# Patient Record
Sex: FEMALE | Race: WHITE | NOT HISPANIC OR LATINO | Employment: OTHER | URBAN - METROPOLITAN AREA
[De-identification: names, ages, dates, MRNs, and addresses within clinical notes are randomized per-mention and may not be internally consistent; named-entity substitution may affect disease eponyms.]

---

## 2018-09-11 ENCOUNTER — TRANSFERRED RECORDS (OUTPATIENT)
Dept: HEALTH INFORMATION MANAGEMENT | Facility: CLINIC | Age: 68
End: 2018-09-11

## 2018-09-27 ENCOUNTER — TRANSFERRED RECORDS (OUTPATIENT)
Dept: HEALTH INFORMATION MANAGEMENT | Facility: CLINIC | Age: 68
End: 2018-09-27

## 2018-11-06 ENCOUNTER — TRANSFERRED RECORDS (OUTPATIENT)
Dept: HEALTH INFORMATION MANAGEMENT | Facility: CLINIC | Age: 68
End: 2018-11-06

## 2018-11-09 ENCOUNTER — TRANSFERRED RECORDS (OUTPATIENT)
Dept: HEALTH INFORMATION MANAGEMENT | Facility: CLINIC | Age: 68
End: 2018-11-09

## 2018-11-13 DIAGNOSIS — M31.30 WEGENER'S GRANULOMATOSIS: Primary | ICD-10-CM

## 2018-11-16 ENCOUNTER — PATIENT OUTREACH (OUTPATIENT)
Dept: CARE COORDINATION | Facility: CLINIC | Age: 68
End: 2018-11-16

## 2018-11-17 ENCOUNTER — OFFICE VISIT (OUTPATIENT)
Dept: NEPHROLOGY | Facility: CLINIC | Age: 68
End: 2018-11-17
Attending: INTERNAL MEDICINE
Payer: MEDICARE

## 2018-11-17 VITALS
BODY MASS INDEX: 39.75 KG/M2 | SYSTOLIC BLOOD PRESSURE: 115 MMHG | DIASTOLIC BLOOD PRESSURE: 78 MMHG | OXYGEN SATURATION: 98 % | WEIGHT: 232.8 LBS | HEIGHT: 64 IN | HEART RATE: 78 BPM

## 2018-11-17 DIAGNOSIS — M31.30 WEGENER'S GRANULOMATOSIS: ICD-10-CM

## 2018-11-17 LAB
ALBUMIN UR-MCNC: NEGATIVE MG/DL
APPEARANCE UR: CLEAR
BILIRUB UR QL STRIP: NEGATIVE
COLOR UR AUTO: YELLOW
CREAT UR-MCNC: 155 MG/DL
GLUCOSE UR STRIP-MCNC: NEGATIVE MG/DL
HGB UR QL STRIP: ABNORMAL
KETONES UR STRIP-MCNC: NEGATIVE MG/DL
LEUKOCYTE ESTERASE UR QL STRIP: ABNORMAL
MUCOUS THREADS #/AREA URNS LPF: PRESENT /LPF
NITRATE UR QL: NEGATIVE
PH UR STRIP: 5 PH (ref 5–7)
PROT UR-MCNC: 0.06 G/L
PROT/CREAT 24H UR: 0.04 G/G CR (ref 0–0.2)
RBC #/AREA URNS AUTO: 1 /HPF (ref 0–2)
SOURCE: ABNORMAL
SP GR UR STRIP: 1.02 (ref 1–1.03)
SQUAMOUS #/AREA URNS AUTO: 1 /HPF (ref 0–1)
UROBILINOGEN UR STRIP-MCNC: 0 MG/DL (ref 0–2)
WBC #/AREA URNS AUTO: 2 /HPF (ref 0–5)

## 2018-11-17 PROCEDURE — G0463 HOSPITAL OUTPT CLINIC VISIT: HCPCS | Mod: ZF

## 2018-11-17 PROCEDURE — 81001 URINALYSIS AUTO W/SCOPE: CPT | Performed by: INTERNAL MEDICINE

## 2018-11-17 PROCEDURE — 84156 ASSAY OF PROTEIN URINE: CPT | Performed by: INTERNAL MEDICINE

## 2018-11-17 RX ORDER — VALACYCLOVIR HYDROCHLORIDE 1 G/1
TABLET, FILM COATED ORAL
COMMUNITY
Start: 2018-11-12 | End: 2019-04-18

## 2018-11-17 RX ORDER — SULFAMETHOXAZOLE/TRIMETHOPRIM 800-160 MG
TABLET ORAL
COMMUNITY
Start: 2018-09-12 | End: 2019-04-18

## 2018-11-17 RX ORDER — LEVOTHYROXINE SODIUM 112 UG/1
112 TABLET ORAL
COMMUNITY
End: 2019-05-08 | Stop reason: DRUGHIGH

## 2018-11-17 ASSESSMENT — PAIN SCALES - GENERAL: PAINLEVEL: NO PAIN (0)

## 2018-11-17 NOTE — NURSING NOTE
"Chief Complaint   Patient presents with     Consult     Wegener's Granulomatosis      Blood pressure 118/76, pulse 78, height 1.632 m (5' 4.25\"), weight 105.6 kg (232 lb 12.8 oz), SpO2 98 %.    JENIFER SILVERMAN CMA    "

## 2018-11-17 NOTE — LETTER
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 She was hospitalized x 10 days in [place]. Initially, hemoptysis attributed to pneumonia. She mentioned that her lung was \"messed up enough\" that they could not perform a (transbronchial) lung biopsy. She was put on prednisone and tapered off eventually. While she was at [place], the diagnosis of Wegener's was not confirmed.  March 2013:  Back in [place].  Admitted to [facility] with acute renal failure and scant hemoptysis.  She had significant fatigue, oral ulcers, scant purpura on the forearms ( \"months\" after finishing the course of prednisone).  She did not require dialysis.  Dx of \"Wegener's granulomatosis\"  -> plasmapheresis (x 10+ sessions?) under the care of Dr Lowry (nephrology) .  She received prednisone and was treated with Rituximab once per week for 4 weeks.  April 2013 - 2018  : She was on prednisone for around 4 months. She experienced a lot of side effect including weight gain, mood changes, cushingoid facies.  She denies have hyperglycemia.  She was tapered off prednisone She then received rituximab every 6 months (1000 mg)       Treatment summary  March 2013: Plasmapheresis + Rituximab  Rituximab 1000 mg IV every 6 month since initial treatment in 2013 October 2018: Last dose of Rituximab 1000 mg. (8months after previous dose)  "     Interval summary  Her main question at this time pertains to the need for continued maintenance therapy with rituximab.  She asks about her risk for relapse.  Since she has been getting rituximab since the onset of the disease, she is not sure about how to move forward. She endorses having joint pain all of the time from multiple kinds of arthritis. She denies nose bleeding/hair loss/mouth sore but would have periodic nose congestion. Overall, she did not have any official flare' of the disease since 2013. She only has the aforementioned symptoms which she thinks it could be related to cold or other minor illness. Reported feeling good 8/10. The bothersome symptoms were plantar fasciitis s/p steroid injection and back pain. She has not been using CPAP because she was not satisfied with the encounter with sleep providers.    PAST MEDICAL HISTORY:  Past Medical History:   Diagnosis Date     DVT (deep venous thrombosis) (H)     occured 1 month after starting OCP     GERD (gastroesophageal reflux disease)      Hyperuricemia      Hypothyroid      Obesity (BMI 35.0-39.9 without comorbidity)      Venous insufficiency (chronic) (peripheral)      Wegener's granulomatosis with renal involvement (H)     , No history of complicated pregnancies.  Last pregnancy with twins       PAST SURGICAL HISTORY:  Past Surgical History:   Procedure Laterality Date     HYSTERECTOMY       TUBAL LIGATION            MEDICATIONS:  Prescription Medications as of 2018             LACTOBACILLUS PO 1 tablet    levothyroxine (SYNTHROID/LEVOTHROID) 112 MCG tablet Take 112 mcg by mouth    sulfamethoxazole-trimethoprim (BACTRIM DS/SEPTRA DS) 800-160 MG per tablet     UNABLE TO FIND TURMERIC OR    valACYclovir (VALTREX) 1000 mg tablet         ALLERGIES:    Not on File     REVIEW OF SYSTEMS:  A comprehensive review of systems was performed and found to be negative except as described here or above.   SOCIAL HISTORY:   Social  "History     Social History     Marital status:      Spouse name: N/A     Number of children: N/A     Years of education: N/A     Occupational History     Not on file.     Social History Main Topics     Smoking status: Never Smoker     Smokeless tobacco: Never Used     Alcohol use No     Drug use: No     Sexual activity: Yes     Partners: Male     Other Topics Concern     Not on file     Social History Narrative     No narrative on file     Lives in [place]  Family is in town in [place]     FAMILY MEDICAL HISTORY:   Has 4 children, Daughter 48, Daughter 47, fraternal twin : daughter and son age 46    PHYSICAL EXAM:   /78  Pulse 78  Ht 1.632 m (5' 4.25\")  Wt 105.6 kg (232 lb 12.8 oz)  SpO2 98%  BMI 39.65 kg/m2  GENERAL APPEARANCE: alert and no distress, joyful  EYES: nonicteric  HENT: mouth without ulcers or lesions, TM without erythema  NECK: supple, no adenopathy  RESP: lungs clear to auscultation   CV: regular rhythm, normal rate, no rub  ABDOMEN: soft, nontender, normal bowel sounds  Extremities: no clubbing, cyanosis, trace pitting edema left>right  MS: no evidence of inflammation in joints, no muscle tenderness  SKIN: no rash  NEURO: mentation intact and speech normal  PSYCH: affect normal/bright   LABS:       Result See Note  4/11/18  Comment:  KELSEY BRISCOE  MRN:    [id number]  Myeloperoxidase Antibody 0   Comment:  INTERPRETIVE INFORMATION: Myeloperoxidase Abs, IgG    19 AU/mL or Less ......... Negative    20-25 AU/mL .............. Equivocal    26 AU/mL or Greater ...... Positive       Serine Protease 3 Antibody  Test                          Result   Units   Ref. Interval  Serine Protease3, IgG         4        AU/mL   0-19  INTERPRETIVE INFORMATION: Serine Protease 3, IgG  19 AU/mL or Less ........ Negative  20-25 AU/mL ............. Equivocal  26 AU/mL or Greater ..... Positive       Recent Results (from the past 1008 hour(s))   UA with Microscopic reflex to Culture    Collection " Time: 11/17/18  7:56 AM   Result Value Ref Range    Color Urine Yellow     Appearance Urine Clear     Glucose Urine Negative NEG^Negative mg/dL    Bilirubin Urine Negative NEG^Negative    Ketones Urine Negative NEG^Negative mg/dL    Specific Gravity Urine 1.018 1.003 - 1.035    Blood Urine Small (A) NEG^Negative    pH Urine 5.0 5.0 - 7.0 pH    Protein Albumin Urine Negative NEG^Negative mg/dL    Urobilinogen mg/dL 0.0 0.0 - 2.0 mg/dL    Nitrite Urine Negative NEG^Negative    Leukocyte Esterase Urine Small (A) NEG^Negative    Source Midstream Urine     WBC Urine 2 0 - 5 /HPF    RBC Urine 1 0 - 2 /HPF    Squamous Epithelial /HPF Urine 1 0 - 1 /HPF    Mucous Urine Present (A) NEG^Negative /LPF   Protein  random urine with Creat Ratio    Collection Time: 11/17/18  7:56 AM   Result Value Ref Range    Protein Random Urine 0.06 g/L    Protein Total Urine g/gr Creatinine 0.04 0 - 0.2 g/g Cr   Creatinine urine calculation only    Collection Time: 11/17/18  7:56 AM   Result Value Ref Range    Creatinine Urine 155 mg/dL   Urine sediment today is BLAND  Outside BMP 11/9/18  Cr 0.79 (GFR 73 ml/min)  Hb 13.4 g/dl  WBC 9.2 k/uL  CRP 1.7 mg/dl    URINE STUDIES  Recent Labs   Lab Test  11/17/18   0756   COLOR  Yellow   APPEARANCE  Clear   URINEGLC  Negative   URINEBILI  Negative   URINEKETONE  Negative   SG  1.018   UBLD  Small*   URINEPH  5.0   PROTEIN  Negative   NITRITE  Negative   LEUKEST  Small*   RBCU  1   WBCU  2     Recent Labs   Lab Test  11/17/18   0756   UTPG  0.04        L Ext Doppler  6/3/2015  Result Impression     Negative for acute DVT. Chronic changes of previous thrombosis in the left lower extremity deep venous system as described below.     FINDINGS:     RIGHT:  The common femoral, upper deep femoral, femoral, and popliteal veins are widely patent, without thrombus. The posterior tibial and peroneal veins were segmentally visualized and are normal where seen. The great saphenous vein is patent and negative for  thrombus. There is a 2.4 x 1.0 x 3.6 cm in size right popliteal fossa cyst.     LEFT: There are chronic changes of previous thrombosis, including irregular venous wall thickening, reduced luminal diameter, and intraluminal webbing, in the left common femoral, femoral, popliteal, and gastrocnemius veins. No acute deep venous thrombosis. The left upper deep femoral and segmentally visualized posterior tibial and peroneal veins are widely patent. The left great saphenous vein is also patent. There is a 1.5 x 0.6 x 2.6 cm in size left popliteal fossa cyst.       Electronically signed by:    Kike Bertrand MD  [phone] 03-Jun-2015 11:26      CT Chest without IV contrast 6/2/2015  Result Impression     1. 8 mm nodule, with adjacent smaller nodules, in the left lower lobe has been stable since outside chest CT 04/21/2014. Recommend follow-up imaging in 6-12 months.   2. Stable bilateral patchy ground glass opacities, likely due to shallow inspiration.   3. Mild right hilar adenopathy, which is likely reactive.     FINDINGS:  Stable 8 mm nodule in the superior segment of the left lower lobe; this nodule has been stable since outside chest CT 04/21/2014. The two surrounding smaller nodules are relatively stable in appearance since 12/01/2014 (these nodules were obscured on outside chest CT). No new pulmonary nodules. Stable scattered groundglass opacities in both lungs which may be due to shallow inspiration. Tiny calcified granuloma right lung apex. Trace amount of atelectasis in the lingula. Scattered mediastinal and left hilar lymph nodes are more prominent on today's exam, however remain within normal limits for size. New mild right hilar adenopathy, with largest node measuring approximately 1 cm in maximal diameter.     Electronically signed by:    MAGALIS Vargas -[id number] 02-Jun-2015 10:46        Mario Freeman MD [phone] 02-Jun-2015 10:46       CT lung 2018: Stable lung nodules and 1 mm non-obstructing stone at upper pole of left  kidney    ASSESSMENT AND PLAN:  # GPA (dx 2013)  Presented with pulmonary and renal involvement. S/P Rituximab q6 month since 5954-6502 without flare of the disease. Her last serology on April 2018 were reassuring. As she has been through a quiescent course, it may be reasonable to space out and/or reduce the dosage of rituximab. Last dose of rituximab was Oct 2018 . She will come back from [place] in March 2019.  - Vasculitis panel and Bcell function today.  She is interested in spacing out the intervals between rituximab doses.  We discussed the approach of dosing based on CD19+ B cells, ANCA titers, and symptoms.  She states her ANCA titers have persistently been negative.   - f/u around March 2019 after the patient is back from [place]      Mario Fernandes MD  Division of Renal Disease and Hypertension  November 17, 2018  6:29 AM  Note completed November 18, 2018  8:49 PM        Mario Fernandes MD

## 2018-11-17 NOTE — PROGRESS NOTES
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 She was hospitalized x 10 days in [place]. Initially, hemoptysis attributed to pneumonia. She mentioned that her lung was \"messed up enough\" that they could not perform a (transbronchial) lung biopsy. She was put on prednisone and tapered off eventually. While she was at [place], the diagnosis of Wegener's was not confirmed.  March 2013:  Back in [place].  Admitted to [facility] with acute renal failure and scant hemoptysis.  She had significant fatigue, oral ulcers, scant purpura on the forearms ( \"months\" after finishing the course of prednisone).  She did not require dialysis.  Dx of \"Wegener's granulomatosis\"  -> plasmapheresis (x 10+ sessions?) under the care of Dr Lowry (nephrology) .  She received prednisone and was treated with Rituximab once per week for 4 weeks.  April 2013 - 2018  : She was on prednisone for around 4 months. She experienced a lot of side effect including weight gain, mood changes, cushingoid facies.  She denies have hyperglycemia.  She was tapered off prednisone She then received rituximab every 6 months (1000 mg)       Treatment summary  March 2013: Plasmapheresis + Rituximab  Rituximab 1000 mg IV every 6 month since initial treatment in 2013 October 2018: Last dose of Rituximab 1000 mg. (8months after previous dose)      Interval summary  Her main question at this time pertains to the need for continued " maintenance therapy with rituximab.  She asks about her risk for relapse.  Since she has been getting rituximab since the onset of the disease, she is not sure about how to move forward. She endorses having joint pain all of the time from multiple kinds of arthritis. She denies nose bleeding/hair loss/mouth sore but would have periodic nose congestion. Overall, she did not have any official flare' of the disease since 2013. She only has the aforementioned symptoms which she thinks it could be related to cold or other minor illness. Reported feeling good 8/10. The bothersome symptoms were plantar fasciitis s/p steroid injection and back pain. She has not been using CPAP because she was not satisfied with the encounter with sleep providers.    PAST MEDICAL HISTORY:  Past Medical History:   Diagnosis Date     DVT (deep venous thrombosis) (H)     occured 1 month after starting OCP     GERD (gastroesophageal reflux disease)      Hyperuricemia      Hypothyroid      Obesity (BMI 35.0-39.9 without comorbidity)      Venous insufficiency (chronic) (peripheral)      Wegener's granulomatosis with renal involvement (H)     , No history of complicated pregnancies.  Last pregnancy with twins       PAST SURGICAL HISTORY:  Past Surgical History:   Procedure Laterality Date     HYSTERECTOMY       TUBAL LIGATION            MEDICATIONS:  Prescription Medications as of 2018             LACTOBACILLUS PO 1 tablet    levothyroxine (SYNTHROID/LEVOTHROID) 112 MCG tablet Take 112 mcg by mouth    sulfamethoxazole-trimethoprim (BACTRIM DS/SEPTRA DS) 800-160 MG per tablet     UNABLE TO FIND TURMERIC OR    valACYclovir (VALTREX) 1000 mg tablet         ALLERGIES:    Not on File     REVIEW OF SYSTEMS:  A comprehensive review of systems was performed and found to be negative except as described here or above.   SOCIAL HISTORY:   Social History     Social History     Marital status:      Spouse name: N/A     Number of  "children: N/A     Years of education: N/A     Occupational History     Not on file.     Social History Main Topics     Smoking status: Never Smoker     Smokeless tobacco: Never Used     Alcohol use No     Drug use: No     Sexual activity: Yes     Partners: Male     Other Topics Concern     Not on file     Social History Narrative     No narrative on file     Lives in [place]  Family is in town in [place]     FAMILY MEDICAL HISTORY:   Has 4 children, Daughter 48, Daughter 47, fraternal twin : daughter and son age 46    PHYSICAL EXAM:   /78  Pulse 78  Ht 1.632 m (5' 4.25\")  Wt 105.6 kg (232 lb 12.8 oz)  SpO2 98%  BMI 39.65 kg/m2  GENERAL APPEARANCE: alert and no distress, joyful  EYES: nonicteric  HENT: mouth without ulcers or lesions, TM without erythema  NECK: supple, no adenopathy  RESP: lungs clear to auscultation   CV: regular rhythm, normal rate, no rub  ABDOMEN: soft, nontender, normal bowel sounds  Extremities: no clubbing, cyanosis, trace pitting edema left>right  MS: no evidence of inflammation in joints, no muscle tenderness  SKIN: no rash  NEURO: mentation intact and speech normal  PSYCH: affect normal/bright   LABS:       Result See Note 4/11/18  Comment:  KELSEY BRISCOE  MRN:    [id number]  Myeloperoxidase Antibody 0   Comment:  INTERPRETIVE INFORMATION: Myeloperoxidase Abs, IgG    19 AU/mL or Less ......... Negative    20-25 AU/mL .............. Equivocal    26 AU/mL or Greater ...... Positive       Serine Protease 3 Antibody  Test                          Result   Units   Ref. Interval  Serine Protease3, IgG         4        AU/mL   0-19  INTERPRETIVE INFORMATION: Serine Protease 3, IgG  19 AU/mL or Less ........ Negative  20-25 AU/mL ............. Equivocal  26 AU/mL or Greater ..... Positive       Recent Results (from the past 1008 hour(s))   UA with Microscopic reflex to Culture    Collection Time: 11/17/18  7:56 AM   Result Value Ref Range    Color Urine Yellow     Appearance " Urine Clear     Glucose Urine Negative NEG^Negative mg/dL    Bilirubin Urine Negative NEG^Negative    Ketones Urine Negative NEG^Negative mg/dL    Specific Gravity Urine 1.018 1.003 - 1.035    Blood Urine Small (A) NEG^Negative    pH Urine 5.0 5.0 - 7.0 pH    Protein Albumin Urine Negative NEG^Negative mg/dL    Urobilinogen mg/dL 0.0 0.0 - 2.0 mg/dL    Nitrite Urine Negative NEG^Negative    Leukocyte Esterase Urine Small (A) NEG^Negative    Source Midstream Urine     WBC Urine 2 0 - 5 /HPF    RBC Urine 1 0 - 2 /HPF    Squamous Epithelial /HPF Urine 1 0 - 1 /HPF    Mucous Urine Present (A) NEG^Negative /LPF   Protein  random urine with Creat Ratio    Collection Time: 11/17/18  7:56 AM   Result Value Ref Range    Protein Random Urine 0.06 g/L    Protein Total Urine g/gr Creatinine 0.04 0 - 0.2 g/g Cr   Creatinine urine calculation only    Collection Time: 11/17/18  7:56 AM   Result Value Ref Range    Creatinine Urine 155 mg/dL   Urine sediment today is BLAND  Outside BMP 11/9/18  Cr 0.79 (GFR 73 ml/min)  Hb 13.4 g/dl  WBC 9.2 k/uL  CRP 1.7 mg/dl    URINE STUDIES  Recent Labs   Lab Test  11/17/18   0756   COLOR  Yellow   APPEARANCE  Clear   URINEGLC  Negative   URINEBILI  Negative   URINEKETONE  Negative   SG  1.018   UBLD  Small*   URINEPH  5.0   PROTEIN  Negative   NITRITE  Negative   LEUKEST  Small*   RBCU  1   WBCU  2     Recent Labs   Lab Test  11/17/18   0756   UTPG  0.04        L Ext Doppler  6/3/2015  Result Impression     Negative for acute DVT. Chronic changes of previous thrombosis in the left lower extremity deep venous system as described below.     FINDINGS:     RIGHT:  The common femoral, upper deep femoral, femoral, and popliteal veins are widely patent, without thrombus. The posterior tibial and peroneal veins were segmentally visualized and are normal where seen. The great saphenous vein is patent and negative for thrombus. There is a 2.4 x 1.0 x 3.6 cm in size right popliteal fossa cyst.     LEFT:  There are chronic changes of previous thrombosis, including irregular venous wall thickening, reduced luminal diameter, and intraluminal webbing, in the left common femoral, femoral, popliteal, and gastrocnemius veins. No acute deep venous thrombosis. The left upper deep femoral and segmentally visualized posterior tibial and peroneal veins are widely patent. The left great saphenous vein is also patent. There is a 1.5 x 0.6 x 2.6 cm in size left popliteal fossa cyst.       Electronically signed by:    Kike Bertrand MD  [phone] 03-Jun-2015 11:26      CT Chest without IV contrast 6/2/2015  Result Impression     1. 8 mm nodule, with adjacent smaller nodules, in the left lower lobe has been stable since outside chest CT 04/21/2014. Recommend follow-up imaging in 6-12 months.   2. Stable bilateral patchy ground glass opacities, likely due to shallow inspiration.   3. Mild right hilar adenopathy, which is likely reactive.     FINDINGS:  Stable 8 mm nodule in the superior segment of the left lower lobe; this nodule has been stable since outside chest CT 04/21/2014. The two surrounding smaller nodules are relatively stable in appearance since 12/01/2014 (these nodules were obscured on outside chest CT). No new pulmonary nodules. Stable scattered groundglass opacities in both lungs which may be due to shallow inspiration. Tiny calcified granuloma right lung apex. Trace amount of atelectasis in the lingula. Scattered mediastinal and left hilar lymph nodes are more prominent on today's exam, however remain within normal limits for size. New mild right hilar adenopathy, with largest node measuring approximately 1 cm in maximal diameter.     Electronically signed by:    MAGALIS Vargas -[id number] 02-Jun-2015 10:46        Mario Freeman MD [phone] 02-Jun-2015 10:46       CT lung 2018: Stable lung nodules and 1 mm non-obstructing stone at upper pole of left kidney    ASSESSMENT AND PLAN:  # GPA (dx 2013)  Presented with pulmonary and renal  involvement. S/P Rituximab q6 month since [id number] without flare of the disease. Her last serology on April 2018 were reassuring. As she has been through a quiescent course, it may be reasonable to space out and/or reduce the dosage of rituximab. Last dose of rituximab was Oct 2018 . She will come back from [place] in March 2019.  - Vasculitis panel and Bcell function today.  She is interested in spacing out the intervals between rituximab doses.  We discussed the approach of dosing based on CD19+ B cells, ANCA titers, and symptoms.  She states her ANCA titers have persistently been negative.   - f/u around March 2019 after the patient is back from [place]      Mario Fernandes MD  Division of Renal Disease and Hypertension  November 17, 2018  6:29 AM  Note completed November 18, 2018  8:49 PM

## 2018-11-17 NOTE — MR AVS SNAPSHOT
After Visit Summary   11/17/2018    Waleska Veronica    MRN: [id number]           Patient Information     Date Of Birth          1950        Visit Information        Provider Department      11/17/2018 8:00 AM Mario Fernandes MD [facility]        Today's Diagnoses     Wegener's granulomatosis (H)           Follow-ups after your visit        Follow-up notes from your care team     Return in about 4 months (around 3/17/2019) for Routine Visit.      Future tests that were ordered for you today     Open Future Orders        Priority Expected Expires Ordered    Vasculitis panel Routine  11/17/2019 11/17/2018    CD19 B Cell Count Routine  11/17/2019 11/17/2018            Who to contact     If you have questions or need follow up information about today's clinic visit or your schedule please contact [facility] directly at [phone].  Normal or non-critical lab and imaging results will be communicated to you by [employer], letter or phone within 4 business days after the clinic has received the results. If you do not hear from us within 7 days, please contact the clinic through [employer] or phone. If you have a critical or abnormal lab result, we will notify you by phone as soon as possible.  Submit refill requests through [employer] or call your pharmacy and they will forward the refill request to us. Please allow 3 business days for your refill to be completed.          Additional Information About Your Visit        MyChart Information     [employer] gives you secure access to your electronic health record. If you see a primary care provider, you can also send messages to your care team and make appointments. If you have questions, please call your primary care clinic.  If you do not have a primary care provider, please call [phone] and they will assist you.        Care EveryWhere ID     This is your Care EveryWhere ID. This could be used by other organizations to access your [place]  "medical records  [id number]        Your Vitals Were     Pulse Height Pulse Oximetry BMI (Body Mass Index)          78 1.632 m (5' 4.25\") 98% 39.65 kg/m2         Blood Pressure from Last 3 Encounters:   11/17/18 115/78    Weight from Last 3 Encounters:   11/17/18 105.6 kg (232 lb 12.8 oz)              We Performed the Following     Creatinine urine calculation only     Protein  random urine with Creat Ratio     UA with Microscopic reflex to Culture        Primary Care Provider Fax #    Physician No Ref-Primary [phone]       No address on file        Equal Access to Services     [facility]: Hadantoine Pineda, wacam spencer, kirstin kaalmajovon cain, yisel connors . [facility] [phone].    ATENCIÓN: Si habla español, tiene a gomez disposición servicios gratuitos de asistencia lingüística. [facility] [phone].    We comply with applicable federal civil rights laws and [place] laws. We do not discriminate on the basis of race, color, national origin, age, disability, sex, sexual orientation, or gender identity.            Thank you!     Thank you for choosing [facility]  for your care. Our goal is always to provide you with excellent care. Hearing back from our patients is one way we can continue to improve our services. Please take a few minutes to complete the written survey that you may receive in the mail after your visit with us. Thank you!             Your Updated Medication List - Protect others around you: Learn how to safely use, store and throw away your medicines at www.disposemymeds.org.          This list is accurate as of 11/17/18 11:59 PM.  Always use your most recent med list.                   Brand Name Dispense Instructions for use Diagnosis    LACTOBACILLUS PO      1 tablet        levothyroxine 112 MCG tablet    SYNTHROID/LEVOTHROID     Take 112 mcg by mouth        sulfamethoxazole-trimethoprim 800-160 MG per tablet    BACTRIM DS/SEPTRA DS "          UNABLE TO FIND      TURMERIC OR        valACYclovir 1000 mg tablet    VALTREX

## 2019-01-11 ENCOUNTER — TELEPHONE (OUTPATIENT)
Dept: NEPHROLOGY | Facility: CLINIC | Age: 69
End: 2019-01-11

## 2019-01-11 NOTE — TELEPHONE ENCOUNTER
[facility] Call Center    Phone Message    May a detailed message be left on voicemail: yes    Reason for Call: Other: Please send lab orders electronically as lab core faxing system is down.      Action Taken: Message routed to:  [facility] neph

## 2019-01-16 DIAGNOSIS — I77.82 ANCA-ASSOCIATED VASCULITIS (H): Primary | ICD-10-CM

## 2019-01-19 DIAGNOSIS — I77.82 ANCA-ASSOCIATED VASCULITIS (H): ICD-10-CM

## 2019-01-19 DIAGNOSIS — M31.30 WEGENER'S GRANULOMATOSIS: ICD-10-CM

## 2019-01-19 LAB
ALBUMIN SERPL-MCNC: 3.3 G/DL (ref 3.4–5)
ANION GAP SERPL CALCULATED.3IONS-SCNC: 4 MMOL/L (ref 3–14)
BUN SERPL-MCNC: 17 MG/DL (ref 7–30)
CALCIUM SERPL-MCNC: 8.3 MG/DL (ref 8.5–10.1)
CD19 CELLS # BLD: <1 CELLS/UL (ref 107–698)
CD19 CELLS NFR BLD: <1 % (ref 6–27)
CHLORIDE SERPL-SCNC: 107 MMOL/L (ref 94–109)
CO2 SERPL-SCNC: 29 MMOL/L (ref 20–32)
CREAT SERPL-MCNC: 0.86 MG/DL (ref 0.52–1.04)
ERYTHROCYTE [DISTWIDTH] IN BLOOD BY AUTOMATED COUNT: 13.3 % (ref 10–15)
GFR SERPL CREATININE-BSD FRML MDRD: 69 ML/MIN/{1.73_M2}
GLUCOSE SERPL-MCNC: 108 MG/DL (ref 70–99)
HCT VFR BLD AUTO: 44.8 % (ref 35–47)
HGB BLD-MCNC: 13.8 G/DL (ref 11.7–15.7)
MCH RBC QN AUTO: 28.8 PG (ref 26.5–33)
MCHC RBC AUTO-ENTMCNC: 30.8 G/DL (ref 31.5–36.5)
MCV RBC AUTO: 94 FL (ref 78–100)
MYELOPEROXIDASE AB SER-ACNC: <0.2 AI (ref 0–0.9)
PHOSPHATE SERPL-MCNC: 2.7 MG/DL (ref 2.5–4.5)
PLATELET # BLD AUTO: 328 10E9/L (ref 150–450)
POTASSIUM SERPL-SCNC: 4.2 MMOL/L (ref 3.4–5.3)
PROTEINASE3 IGG SER-ACNC: 0.2 AI (ref 0–0.9)
RBC # BLD AUTO: 4.79 10E12/L (ref 3.8–5.2)
SODIUM SERPL-SCNC: 140 MMOL/L (ref 133–144)
WBC # BLD AUTO: 9.1 10E9/L (ref 4–11)

## 2019-03-02 ENCOUNTER — OFFICE VISIT (OUTPATIENT)
Dept: NEPHROLOGY | Facility: CLINIC | Age: 69
End: 2019-03-02
Attending: INTERNAL MEDICINE
Payer: MEDICARE

## 2019-03-02 VITALS
DIASTOLIC BLOOD PRESSURE: 74 MMHG | BODY MASS INDEX: 41 KG/M2 | HEIGHT: 63 IN | SYSTOLIC BLOOD PRESSURE: 107 MMHG | WEIGHT: 231.4 LBS | TEMPERATURE: 97.4 F | OXYGEN SATURATION: 95 % | HEART RATE: 74 BPM

## 2019-03-02 DIAGNOSIS — M31.30 WEGENER'S GRANULOMATOSIS: ICD-10-CM

## 2019-03-02 DIAGNOSIS — M31.30 WEGENER'S GRANULOMATOSIS: Primary | ICD-10-CM

## 2019-03-02 DIAGNOSIS — N39.0 URINARY TRACT INFECTION WITHOUT HEMATURIA, SITE UNSPECIFIED: ICD-10-CM

## 2019-03-02 LAB
ALBUMIN SERPL-MCNC: 3.2 G/DL (ref 3.4–5)
ALBUMIN UR-MCNC: NEGATIVE MG/DL
ANION GAP SERPL CALCULATED.3IONS-SCNC: 5 MMOL/L (ref 3–14)
APPEARANCE UR: ABNORMAL
BASOPHILS # BLD AUTO: 0.1 10E9/L (ref 0–0.2)
BASOPHILS NFR BLD AUTO: 0.9 %
BILIRUB UR QL STRIP: NEGATIVE
BUN SERPL-MCNC: 18 MG/DL (ref 7–30)
CALCIUM SERPL-MCNC: 8.7 MG/DL (ref 8.5–10.1)
CD19 CELLS # BLD: <1 CELLS/UL (ref 107–698)
CD19 CELLS NFR BLD: <1 % (ref 6–27)
CHLORIDE SERPL-SCNC: 108 MMOL/L (ref 94–109)
CO2 SERPL-SCNC: 28 MMOL/L (ref 20–32)
COLOR UR AUTO: YELLOW
CREAT SERPL-MCNC: 0.84 MG/DL (ref 0.52–1.04)
DIFFERENTIAL METHOD BLD: ABNORMAL
EOSINOPHIL # BLD AUTO: 0.4 10E9/L (ref 0–0.7)
EOSINOPHIL NFR BLD AUTO: 3.8 %
ERYTHROCYTE [DISTWIDTH] IN BLOOD BY AUTOMATED COUNT: 13 % (ref 10–15)
GFR SERPL CREATININE-BSD FRML MDRD: 72 ML/MIN/{1.73_M2}
GLUCOSE SERPL-MCNC: 99 MG/DL (ref 70–99)
GLUCOSE UR STRIP-MCNC: NEGATIVE MG/DL
HCT VFR BLD AUTO: 44 % (ref 35–47)
HGB BLD-MCNC: 13.5 G/DL (ref 11.7–15.7)
HGB UR QL STRIP: ABNORMAL
IMM GRANULOCYTES # BLD: 0 10E9/L (ref 0–0.4)
IMM GRANULOCYTES NFR BLD: 0.3 %
KETONES UR STRIP-MCNC: NEGATIVE MG/DL
LEUKOCYTE ESTERASE UR QL STRIP: ABNORMAL
LYMPHOCYTES # BLD AUTO: 1.7 10E9/L (ref 0.8–5.3)
LYMPHOCYTES NFR BLD AUTO: 16.9 %
MCH RBC QN AUTO: 28.7 PG (ref 26.5–33)
MCHC RBC AUTO-ENTMCNC: 30.7 G/DL (ref 31.5–36.5)
MCV RBC AUTO: 94 FL (ref 78–100)
MONOCYTES # BLD AUTO: 0.7 10E9/L (ref 0–1.3)
MONOCYTES NFR BLD AUTO: 7 %
MUCOUS THREADS #/AREA URNS LPF: PRESENT /LPF
NEUTROPHILS # BLD AUTO: 7.1 10E9/L (ref 1.6–8.3)
NEUTROPHILS NFR BLD AUTO: 71.1 %
NITRATE UR QL: NEGATIVE
NRBC # BLD AUTO: 0 10*3/UL
NRBC BLD AUTO-RTO: 0 /100
PH UR STRIP: 5 PH (ref 5–7)
PHOSPHATE SERPL-MCNC: 3.1 MG/DL (ref 2.5–4.5)
PLATELET # BLD AUTO: 316 10E9/L (ref 150–450)
POTASSIUM SERPL-SCNC: 4.8 MMOL/L (ref 3.4–5.3)
RBC # BLD AUTO: 4.7 10E12/L (ref 3.8–5.2)
RBC #/AREA URNS AUTO: 5 /HPF (ref 0–2)
SODIUM SERPL-SCNC: 141 MMOL/L (ref 133–144)
SOURCE: ABNORMAL
SP GR UR STRIP: 1.02 (ref 1–1.03)
SQUAMOUS #/AREA URNS AUTO: 4 /HPF (ref 0–1)
UROBILINOGEN UR STRIP-MCNC: 0 MG/DL (ref 0–2)
WBC # BLD AUTO: 10 10E9/L (ref 4–11)
WBC #/AREA URNS AUTO: 15 /HPF (ref 0–5)

## 2019-03-02 PROCEDURE — 83876 ASSAY MYELOPEROXIDASE: CPT | Performed by: INTERNAL MEDICINE

## 2019-03-02 PROCEDURE — 83516 IMMUNOASSAY NONANTIBODY: CPT | Performed by: INTERNAL MEDICINE

## 2019-03-02 PROCEDURE — 80069 RENAL FUNCTION PANEL: CPT

## 2019-03-02 PROCEDURE — G0463 HOSPITAL OUTPT CLINIC VISIT: HCPCS | Mod: ZF

## 2019-03-02 PROCEDURE — 86355 B CELLS TOTAL COUNT: CPT

## 2019-03-02 PROCEDURE — 85025 COMPLETE CBC W/AUTO DIFF WBC: CPT

## 2019-03-02 PROCEDURE — 36415 COLL VENOUS BLD VENIPUNCTURE: CPT | Performed by: INTERNAL MEDICINE

## 2019-03-02 ASSESSMENT — MIFFLIN-ST. JEOR: SCORE: 1548.75

## 2019-03-02 ASSESSMENT — PAIN SCALES - GENERAL: PAINLEVEL: NO PAIN (0)

## 2019-03-02 NOTE — PROGRESS NOTES
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 She was hospitalized x 10 days in [place]. Initially, hemoptysis attributed to pneumonia. She mentioned that her lung was \"messed up enough\" that they could not perform a (transbronchial) lung biopsy. She was put on prednisone and tapered off eventually. While she was at [place], the diagnosis of Wegener's was not confirmed.  March 2013:  Back in [place].  Admitted to [facility] with acute renal failure and scant hemoptysis.  She had significant fatigue, oral ulcers, scant purpura on the forearms ( \"months\" after finishing the course of prednisone).  She did not require dialysis.  Dx of \"Wegener's granulomatosis\"  -> plasmapheresis (x 10+ sessions?) under the care of Dr Lowry (nephrology) .  She received prednisone and was treated with Rituximab once per week for 4 weeks.  April 2013 - 2018  : She was on prednisone for around 4 months. She experienced a lot of side effect including weight gain, mood changes, cushingoid facies.  She denies have hyperglycemia.  She was tapered off prednisone She then received rituximab every 6 months (1000 mg)       Treatment summary  March 2013: Plasmapheresis + Rituximab  Rituximab 1000 mg IV every 6 month since initial treatment in 2013 SEPTEMBER 7, 2018: Last dose of Rituximab 1000 mg. (8months after previous dose)      Interval summary  Her main question at this time pertains to the need for " "continued maintenance therapy with rituximab.  She asks about her risk for relapse.  Since she has been getting rituximab since the onset of the disease, she is not sure about how to move forward. She endorses having joint pain all of the time from multiple kinds of arthritis. She denies nose bleeding/hair loss/mouth sore but would have periodic nose congestion. Overall, she did not have any official flare' of the disease since 2013. She only has the aforementioned symptoms which she thinks it could be related to cold or other minor illness. Reported feeling good 8/10. The bothersome symptoms were plantar fasciitis s/p steroid injection and back pain. She has not been using CPAP because she was not satisfied with the encounter with sleep providers.    3/2/19  Had a good trip to [place].  Reports no acute illness since the last visit.  Has hot flashes (old)  Dry nose,  No epistaxis.  No oral ulcers.  Cough, with post nasal drip..  Clear but occasional yellow  No F/Chills  Energy:  \" I'm lazy\"  No CP.  No GI symptoms.  No  symptoms.    PAST MEDICAL HISTORY:  Past Medical History:   Diagnosis Date     DVT (deep venous thrombosis) (H)     occured 1 month after starting OCP     GERD (gastroesophageal reflux disease)      Hyperuricemia      Hypothyroid      Obesity (BMI 35.0-39.9 without comorbidity)      Venous insufficiency (chronic) (peripheral)      Wegener's granulomatosis with renal involvement (H)     , No history of complicated pregnancies.  Last pregnancy with twins       PAST SURGICAL HISTORY:  Past Surgical History:   Procedure Laterality Date     HYSTERECTOMY       TUBAL LIGATION            MEDICATIONS:  Prescription Medications as of 3/2/2019       Rx Number Disp Refills Start End Last Dispensed Date Next Fill Date Owning Pharmacy    levothyroxine (SYNTHROID/LEVOTHROID) 112 MCG tablet            Sig: Take 112 mcg by mouth    Class: Historical    Route: Oral    LACTOBACILLUS PO            Sig: " "1 tablet    Class: Historical    valACYclovir (VALTREX) 1000 mg tablet    11/12/2018        Class: HistoricalNOT TAKING        ALLERGIES:    Not on File     REVIEW OF SYSTEMS:  A comprehensive review of systems was performed and found to be negative except as described here or above.   SOCIAL HISTORY:   Social History     Socioeconomic History     Marital status:      Spouse name: Not on file     Number of children: Not on file     Years of education: Not on file     Highest education level: Not on file   Occupational History     Not on file   Social Needs     Financial resource strain: Not on file     Food insecurity:     Worry: Not on file     Inability: Not on file     Transportation needs:     Medical: Not on file     Non-medical: Not on file   Tobacco Use     Smoking status: Never Smoker     Smokeless tobacco: Never Used   Substance and Sexual Activity     Alcohol use: No     Drug use: No     Sexual activity: Yes     Partners: Male   Lifestyle     Physical activity:     Days per week: Not on file     Minutes per session: Not on file     Stress: Not on file   Relationships     Social connections:     Talks on phone: Not on file     Gets together: Not on file     Attends Jain service: Not on file     Active member of club or organization: Not on file     Attends meetings of clubs or organizations: Not on file     Relationship status: Not on file     Intimate partner violence:     Fear of current or ex partner: Not on file     Emotionally abused: Not on file     Physically abused: Not on file     Forced sexual activity: Not on file   Other Topics Concern     Not on file   Social History Narrative     Not on file     Lives in [place]  Family is in town in [place]     FAMILY MEDICAL HISTORY:   Has 4 children, Daughter 48, Daughter 47, fraternal twin : daughter and son age 46    PHYSICAL EXAM:   /74   Pulse 74   Temp 97.4  F (36.3  C) (Oral)   Ht 1.6 m (5' 3\")   Wt 105 kg (231 lb 6.4 " oz)   SpO2 95%   BMI 40.99 kg/m    GENERAL APPEARANCE: alert and no distress, joyful  EYES: nonicteric  HENT: mouth without ulcers or lesions, TM without erythema  NECK: supple, no adenopathy  RESP: lungs clear to auscultation   CV: regular rhythm, normal rate, no rub  ABDOMEN: soft, nontender, normal bowel sounds  Extremities: no clubbing, cyanosis, trace pitting edema left>right  MS: no evidence of inflammation in joints, no muscle tenderness  SKIN: no rash  NEURO: mentation intact and speech normal  PSYCH: affect normal/bright   LABS:       Result See Note 4/11/18  Comment:  KELSEY BRISCOE  MRN:    [id number]  Myeloperoxidase Antibody 0   Comment:  INTERPRETIVE INFORMATION: Myeloperoxidase Abs, IgG    19 AU/mL or Less ......... Negative    20-25 AU/mL .............. Equivocal    26 AU/mL or Greater ...... Positive       Serine Protease 3 Antibody  Test                          Result   Units   Ref. Interval  Serine Protease3, IgG         4        AU/mL   0-19  INTERPRETIVE INFORMATION: Serine Protease 3, IgG  19 AU/mL or Less ........ Negative  20-25 AU/mL ............. Equivocal  26 AU/mL or Greater ..... Positive       Recent Results (from the past 1008 hour(s))   Vasculitis panel    Collection Time: 03/02/19 10:51 AM   Result Value Ref Range    Myeloperoxidase Antibody IgG <0.2 0.0 - 0.9 AI    Proteinase 3 Antibody IgG 0.2 0.0 - 0.9 AI   CD19 B Cell Count    Collection Time: 03/02/19 10:51 AM   Result Value Ref Range    CD19 B Cells <1 (L) 6 - 27 %    Absolute CD19 <1 (L) 107 - 698 cells/uL   CBC with platelets differential    Collection Time: 03/02/19 10:51 AM   Result Value Ref Range    WBC 10.0 4.0 - 11.0 10e9/L    RBC Count 4.70 3.8 - 5.2 10e12/L    Hemoglobin 13.5 11.7 - 15.7 g/dL    Hematocrit 44.0 35.0 - 47.0 %    MCV 94 78 - 100 fl    MCH 28.7 26.5 - 33.0 pg    MCHC 30.7 (L) 31.5 - 36.5 g/dL    RDW 13.0 10.0 - 15.0 %    Platelet Count 316 150 - 450 10e9/L    Diff Method Automated Method     %  Neutrophils 71.1 %    % Lymphocytes 16.9 %    % Monocytes 7.0 %    % Eosinophils 3.8 %    % Basophils 0.9 %    % Immature Granulocytes 0.3 %    Nucleated RBCs 0 0 /100    Absolute Neutrophil 7.1 1.6 - 8.3 10e9/L    Absolute Lymphocytes 1.7 0.8 - 5.3 10e9/L    Absolute Monocytes 0.7 0.0 - 1.3 10e9/L    Absolute Eosinophils 0.4 0.0 - 0.7 10e9/L    Absolute Basophils 0.1 0.0 - 0.2 10e9/L    Abs Immature Granulocytes 0.0 0 - 0.4 10e9/L    Absolute Nucleated RBC 0.0    Renal panel    Collection Time: 03/02/19 10:51 AM   Result Value Ref Range    Sodium 141 133 - 144 mmol/L    Potassium 4.8 3.4 - 5.3 mmol/L    Chloride 108 94 - 109 mmol/L    Carbon Dioxide 28 20 - 32 mmol/L    Anion Gap 5 3 - 14 mmol/L    Glucose 99 70 - 99 mg/dL    Urea Nitrogen 18 7 - 30 mg/dL    Creatinine 0.84 0.52 - 1.04 mg/dL    GFR Estimate 72 >60 mL/min/[1.73_m2]    GFR Estimate If Black 83 >60 mL/min/[1.73_m2]    Calcium 8.7 8.5 - 10.1 mg/dL    Phosphorus 3.1 2.5 - 4.5 mg/dL    Albumin 3.2 (L) 3.4 - 5.0 g/dL   UA reflex to Microscopic    Collection Time: 03/02/19 11:06 AM   Result Value Ref Range    Color Urine Yellow     Appearance Urine Slightly Cloudy     Glucose Urine Negative NEG^Negative mg/dL    Bilirubin Urine Negative NEG^Negative    Ketones Urine Negative NEG^Negative mg/dL    Specific Gravity Urine 1.020 1.003 - 1.035    Blood Urine Small (A) NEG^Negative    pH Urine 5.0 5.0 - 7.0 pH    Protein Albumin Urine Negative NEG^Negative mg/dL    Urobilinogen mg/dL 0.0 0.0 - 2.0 mg/dL    Nitrite Urine Negative NEG^Negative    Leukocyte Esterase Urine Large (A) NEG^Negative    Source Midstream Urine     RBC Urine 5 (H) 0 - 2 /HPF    WBC Urine 15 (H) 0 - 5 /HPF    Squamous Epithelial /HPF Urine 4 (H) 0 - 1 /HPF    Mucous Urine Present (A) NEG^Negative /LPF   Urine sediment today is BLAND  Outside BMP 11/9/18  Cr 0.79 (GFR 73 ml/min)  Hb 13.4 g/dl  WBC 9.2 k/uL  CRP 1.7 mg/dl    URINE STUDIES  Recent Labs   Lab Test 03/02/19  1106 11/17/18  0756    COLOR Yellow Yellow   APPEARANCE Slightly Cloudy Clear   URINEGLC Negative Negative   URINEBILI Negative Negative   URINEKETONE Negative Negative   SG 1.020 1.018   UBLD Small* Small*   URINEPH 5.0 5.0   PROTEIN Negative Negative   NITRITE Negative Negative   LEUKEST Large* Small*   RBCU 5* 1   WBCU 15* 2     Recent Labs   Lab Test 11/17/18  0756   UTPG 0.04        L Ext Doppler  6/3/2015  Result Impression     Negative for acute DVT. Chronic changes of previous thrombosis in the left lower extremity deep venous system as described below.     FINDINGS:     RIGHT:  The common femoral, upper deep femoral, femoral, and popliteal veins are widely patent, without thrombus. The posterior tibial and peroneal veins were segmentally visualized and are normal where seen. The great saphenous vein is patent and negative for thrombus. There is a 2.4 x 1.0 x 3.6 cm in size right popliteal fossa cyst.     LEFT: There are chronic changes of previous thrombosis, including irregular venous wall thickening, reduced luminal diameter, and intraluminal webbing, in the left common femoral, femoral, popliteal, and gastrocnemius veins. No acute deep venous thrombosis. The left upper deep femoral and segmentally visualized posterior tibial and peroneal veins are widely patent. The left great saphenous vein is also patent. There is a 1.5 x 0.6 x 2.6 cm in size left popliteal fossa cyst.       Electronically signed by:    Kike Bertrand MD  [phone] 03-Jun-2015 11:26      CT Chest without IV contrast 6/2/2015  Result Impression     1. 8 mm nodule, with adjacent smaller nodules, in the left lower lobe has been stable since outside chest CT 04/21/2014. Recommend follow-up imaging in 6-12 months.   2. Stable bilateral patchy ground glass opacities, likely due to shallow inspiration.   3. Mild right hilar adenopathy, which is likely reactive.     FINDINGS:  Stable 8 mm nodule in the superior segment of the left lower lobe; this nodule has been stable  since outside chest CT 04/21/2014. The two surrounding smaller nodules are relatively stable in appearance since 12/01/2014 (these nodules were obscured on outside chest CT). No new pulmonary nodules. Stable scattered groundglass opacities in both lungs which may be due to shallow inspiration. Tiny calcified granuloma right lung apex. Trace amount of atelectasis in the lingula. Scattered mediastinal and left hilar lymph nodes are more prominent on today's exam, however remain within normal limits for size. New mild right hilar adenopathy, with largest node measuring approximately 1 cm in maximal diameter.     Electronically signed by:    MAGALIS Vargas -[id number] 02-Jun-2015 10:46        Mario Freeman MD [phone] 02-Jun-2015 10:46       CT lung 2018: Stable lung nodules and 1 mm non-obstructing stone at upper pole of left kidney    ASSESSMENT AND PLAN:  # GPA (dx 2013)  Presented with pulmonary and renal involvement. S/P Rituximab q6 month since 2727-9715 without flare of the disease. Her last serology on April 2018 were reassuring. As she has been through a quiescent course, it may be reasonable to space out and/or reduce the dosage of rituximab. Last dose of rituximab was Sept 2018 .   She remains in complete remission on therapy, with no signs of active disease.  Her ANCA is neg and peripheral B cells remain depleted.  I will continue to monitor these at regular intervals (q ~8 weeks) and redose RTX accordingly or if there are clinical signs of disease activity..    Mario Fernandes MD  Division of Renal Disease and Hypertension  Pager: [phone]  March 2, 2019

## 2019-03-02 NOTE — PATIENT INSTRUCTIONS
[email or web address]    I will check your labs today, including the B cells and the ANCA test.  We will determine when to give you a next dose of rituximab.  I will refer you to Dr Kimberly Leger in ENT.  Ailyn Gonzalez will help you coordinate the care.

## 2019-03-02 NOTE — NURSING NOTE
"Chief Complaint   Patient presents with     RECHECK     ANCA-associated vasculitis     /74   Pulse 74   Temp 97.4  F (36.3  C) (Oral)   Ht 1.6 m (5' 3\")   Wt 105 kg (231 lb 6.4 oz)   SpO2 95%   BMI 40.99 kg/m    Lilian Doyle MA    "

## 2019-03-02 NOTE — LETTER
"3/2/2019       RE: Waleska Veronica  [address] [place] [address]     Dear Colleague,    Thank you for referring your patient, Waleska Veronica, to the [facility] NEPHROLOGY at [facility]. Please see a copy of my visit note below.    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 She was hospitalized x 10 days in [place]. Initially, hemoptysis attributed to pneumonia. She mentioned that her lung was \"messed up enough\" that they could not perform a (transbronchial) lung biopsy. She was put on prednisone and tapered off eventually. While she was at [place], the diagnosis of Wegener's was not confirmed.  March 2013:  Back in [place].  Admitted to [facility] with acute renal failure and scant hemoptysis.  She had significant fatigue, oral ulcers, scant purpura on the forearms ( \"months\" after finishing the course of prednisone).  She did not require dialysis.  Dx of \"Wegener's granulomatosis\"  -> plasmapheresis (x 10+ sessions?) under the care of Dr Lowry (nephrology) .  She received prednisone and was treated with Rituximab once per week for 4 weeks.  April 2013 - 2018  : She was on prednisone for around 4 months. She experienced a lot of side effect including weight gain, mood changes, cushingoid facies.  She denies have hyperglycemia.  She was tapered off prednisone She then received rituximab every 6 months (1000 mg)       Treatment " "summary  2013: Plasmapheresis + Rituximab  Rituximab 1000 mg IV every 6 month since initial treatment in 2013: Last dose of Rituximab 1000 mg. (8months after previous dose)      Interval summary  Her main question at this time pertains to the need for continued maintenance therapy with rituximab.  She asks about her risk for relapse.  Since she has been getting rituximab since the onset of the disease, she is not sure about how to move forward. She endorses having joint pain all of the time from multiple kinds of arthritis. She denies nose bleeding/hair loss/mouth sore but would have periodic nose congestion. Overall, she did not have any official flare' of the disease since 2013. She only has the aforementioned symptoms which she thinks it could be related to cold or other minor illness. Reported feeling good 8/10. The bothersome symptoms were plantar fasciitis s/p steroid injection and back pain. She has not been using CPAP because she was not satisfied with the encounter with sleep providers.    3/2/19  Had a good trip to [place].  Reports no acute illness since the last visit.  Has hot flashes (old)  Dry nose,  No epistaxis.  No oral ulcers.  Cough, with post nasal drip..  Clear but occasional yellow  No F/Chills  Energy:  \" I'm lazy\"  No CP.  No GI symptoms.  No  symptoms.    PAST MEDICAL HISTORY:  Past Medical History:   Diagnosis Date     DVT (deep venous thrombosis) (H)     occured 1 month after starting OCP     GERD (gastroesophageal reflux disease)      Hyperuricemia      Hypothyroid      Obesity (BMI 35.0-39.9 without comorbidity)      Venous insufficiency (chronic) (peripheral)      Wegener's granulomatosis with renal involvement (H)     , No history of complicated pregnancies.  Last pregnancy with twins       PAST SURGICAL HISTORY:  Past Surgical History:   Procedure Laterality Date     HYSTERECTOMY       TUBAL LIGATION            MEDICATIONS:  Prescription " Medications as of 3/2/2019       Rx Number Disp Refills Start End Last Dispensed Date Next Fill Date Owning Pharmacy    levothyroxine (SYNTHROID/LEVOTHROID) 112 MCG tablet            Sig: Take 112 mcg by mouth    Class: Historical    Route: Oral    LACTOBACILLUS PO            Si tablet    Class: Historical    valACYclovir (VALTREX) 1000 mg tablet    2018        Class: HistoricalNOT TAKING        ALLERGIES:    Not on File     REVIEW OF SYSTEMS:  A comprehensive review of systems was performed and found to be negative except as described here or above.   SOCIAL HISTORY:   Social History     Socioeconomic History     Marital status:      Spouse name: Not on file     Number of children: Not on file     Years of education: Not on file     Highest education level: Not on file   Occupational History     Not on file   Social Needs     Financial resource strain: Not on file     Food insecurity:     Worry: Not on file     Inability: Not on file     Transportation needs:     Medical: Not on file     Non-medical: Not on file   Tobacco Use     Smoking status: Never Smoker     Smokeless tobacco: Never Used   Substance and Sexual Activity     Alcohol use: No     Drug use: No     Sexual activity: Yes     Partners: Male   Lifestyle     Physical activity:     Days per week: Not on file     Minutes per session: Not on file     Stress: Not on file   Relationships     Social connections:     Talks on phone: Not on file     Gets together: Not on file     Attends [employer] service: Not on file     Active member of club or organization: Not on file     Attends meetings of clubs or organizations: Not on file     Relationship status: Not on file     Intimate partner violence:     Fear of current or ex partner: Not on file     Emotionally abused: Not on file     Physically abused: Not on file     Forced sexual activity: Not on file   Other Topics Concern     Not on file   Social History Narrative     Not on file     Lives in  [place]  Family is in town in [place]     FAMILY MEDICAL HISTORY:   Has 4 children, Daughter 48, Daughter 47, fraternal twin : daughter and son age 46    PHYSICAL EXAM:   /74   Pulse 74   Temp 97.4  F (36.3  C) (Oral)   Ht 1.6 m (5' 3\")   Wt 105 kg (231 lb 6.4 oz)   SpO2 95%   BMI 40.99 kg/m     GENERAL APPEARANCE: alert and no distress, joyful  EYES: nonicteric  HENT: mouth without ulcers or lesions, TM without erythema  NECK: supple, no adenopathy  RESP: lungs clear to auscultation   CV: regular rhythm, normal rate, no rub  ABDOMEN: soft, nontender, normal bowel sounds  Extremities: no clubbing, cyanosis, trace pitting edema left>right  MS: no evidence of inflammation in joints, no muscle tenderness  SKIN: no rash  NEURO: mentation intact and speech normal  PSYCH: affect normal/bright   LABS:       Result See Note 4/11/18  Comment:  KELSEY BRISCOE  MRN:    [id number]  Myeloperoxidase Antibody 0   Comment:  INTERPRETIVE INFORMATION: Myeloperoxidase Abs, IgG    19 AU/mL or Less ......... Negative    20-25 AU/mL .............. Equivocal    26 AU/mL or Greater ...... Positive       Serine Protease 3 Antibody  Test                          Result   Units   Ref. Interval  Serine Protease3, IgG         4        AU/mL   0-19  INTERPRETIVE INFORMATION: Serine Protease 3, IgG  19 AU/mL or Less ........ Negative  20-25 AU/mL ............. Equivocal  26 AU/mL or Greater ..... Positive       Recent Results (from the past 1008 hour(s))   Vasculitis panel    Collection Time: 03/02/19 10:51 AM   Result Value Ref Range    Myeloperoxidase Antibody IgG <0.2 0.0 - 0.9 AI    Proteinase 3 Antibody IgG 0.2 0.0 - 0.9 AI   CD19 B Cell Count    Collection Time: 03/02/19 10:51 AM   Result Value Ref Range    CD19 B Cells <1 (L) 6 - 27 %    Absolute CD19 <1 (L) 107 - 698 cells/uL   CBC with platelets differential    Collection Time: 03/02/19 10:51 AM   Result Value Ref Range    WBC 10.0 4.0 - 11.0 10e9/L    RBC Count 4.70 " 3.8 - 5.2 10e12/L    Hemoglobin 13.5 11.7 - 15.7 g/dL    Hematocrit 44.0 35.0 - 47.0 %    MCV 94 78 - 100 fl    MCH 28.7 26.5 - 33.0 pg    MCHC 30.7 (L) 31.5 - 36.5 g/dL    RDW 13.0 10.0 - 15.0 %    Platelet Count 316 150 - 450 10e9/L    Diff Method Automated Method     % Neutrophils 71.1 %    % Lymphocytes 16.9 %    % Monocytes 7.0 %    % Eosinophils 3.8 %    % Basophils 0.9 %    % Immature Granulocytes 0.3 %    Nucleated RBCs 0 0 /100    Absolute Neutrophil 7.1 1.6 - 8.3 10e9/L    Absolute Lymphocytes 1.7 0.8 - 5.3 10e9/L    Absolute Monocytes 0.7 0.0 - 1.3 10e9/L    Absolute Eosinophils 0.4 0.0 - 0.7 10e9/L    Absolute Basophils 0.1 0.0 - 0.2 10e9/L    Abs Immature Granulocytes 0.0 0 - 0.4 10e9/L    Absolute Nucleated RBC 0.0    Renal panel    Collection Time: 03/02/19 10:51 AM   Result Value Ref Range    Sodium 141 133 - 144 mmol/L    Potassium 4.8 3.4 - 5.3 mmol/L    Chloride 108 94 - 109 mmol/L    Carbon Dioxide 28 20 - 32 mmol/L    Anion Gap 5 3 - 14 mmol/L    Glucose 99 70 - 99 mg/dL    Urea Nitrogen 18 7 - 30 mg/dL    Creatinine 0.84 0.52 - 1.04 mg/dL    GFR Estimate 72 >60 mL/min/[1.73_m2]    GFR Estimate If Black 83 >60 mL/min/[1.73_m2]    Calcium 8.7 8.5 - 10.1 mg/dL    Phosphorus 3.1 2.5 - 4.5 mg/dL    Albumin 3.2 (L) 3.4 - 5.0 g/dL   UA reflex to Microscopic    Collection Time: 03/02/19 11:06 AM   Result Value Ref Range    Color Urine Yellow     Appearance Urine Slightly Cloudy     Glucose Urine Negative NEG^Negative mg/dL    Bilirubin Urine Negative NEG^Negative    Ketones Urine Negative NEG^Negative mg/dL    Specific Gravity Urine 1.020 1.003 - 1.035    Blood Urine Small (A) NEG^Negative    pH Urine 5.0 5.0 - 7.0 pH    Protein Albumin Urine Negative NEG^Negative mg/dL    Urobilinogen mg/dL 0.0 0.0 - 2.0 mg/dL    Nitrite Urine Negative NEG^Negative    Leukocyte Esterase Urine Large (A) NEG^Negative    Source Midstream Urine     RBC Urine 5 (H) 0 - 2 /HPF    WBC Urine 15 (H) 0 - 5 /HPF    Squamous  Epithelial /HPF Urine 4 (H) 0 - 1 /HPF    Mucous Urine Present (A) NEG^Negative /LPF   Urine sediment today is BLAND  Outside BMP 11/9/18  Cr 0.79 (GFR 73 ml/min)  Hb 13.4 g/dl  WBC 9.2 k/uL  CRP 1.7 mg/dl    URINE STUDIES  Recent Labs   Lab Test 03/02/19  1106 11/17/18  0756   COLOR Yellow Yellow   APPEARANCE Slightly Cloudy Clear   URINEGLC Negative Negative   URINEBILI Negative Negative   URINEKETONE Negative Negative   SG 1.020 1.018   UBLD Small* Small*   URINEPH 5.0 5.0   PROTEIN Negative Negative   NITRITE Negative Negative   LEUKEST Large* Small*   RBCU 5* 1   WBCU 15* 2     Recent Labs   Lab Test 11/17/18  0756   UTPG 0.04        L Ext Doppler  6/3/2015  Result Impression     Negative for acute DVT. Chronic changes of previous thrombosis in the left lower extremity deep venous system as described below.     FINDINGS:     RIGHT:  The common femoral, upper deep femoral, femoral, and popliteal veins are widely patent, without thrombus. The posterior tibial and peroneal veins were segmentally visualized and are normal where seen. The great saphenous vein is patent and negative for thrombus. There is a 2.4 x 1.0 x 3.6 cm in size right popliteal fossa cyst.     LEFT: There are chronic changes of previous thrombosis, including irregular venous wall thickening, reduced luminal diameter, and intraluminal webbing, in the left common femoral, femoral, popliteal, and gastrocnemius veins. No acute deep venous thrombosis. The left upper deep femoral and segmentally visualized posterior tibial and peroneal veins are widely patent. The left great saphenous vein is also patent. There is a 1.5 x 0.6 x 2.6 cm in size left popliteal fossa cyst.       Electronically signed by:    Kike Bertrand MD  [phone] 03-Jun-2015 11:26      CT Chest without IV contrast 6/2/2015  Result Impression     1. 8 mm nodule, with adjacent smaller nodules, in the left lower lobe has been stable since outside chest CT 04/21/2014. Recommend follow-up  imaging in 6-12 months.   2. Stable bilateral patchy ground glass opacities, likely due to shallow inspiration.   3. Mild right hilar adenopathy, which is likely reactive.     FINDINGS:  Stable 8 mm nodule in the superior segment of the left lower lobe; this nodule has been stable since outside chest CT 04/21/2014. The two surrounding smaller nodules are relatively stable in appearance since 12/01/2014 (these nodules were obscured on outside chest CT). No new pulmonary nodules. Stable scattered groundglass opacities in both lungs which may be due to shallow inspiration. Tiny calcified granuloma right lung apex. Trace amount of atelectasis in the lingula. Scattered mediastinal and left hilar lymph nodes are more prominent on today's exam, however remain within normal limits for size. New mild right hilar adenopathy, with largest node measuring approximately 1 cm in maximal diameter.     Electronically signed by:    MAGALIS Vargas -[id number] 02-Jun-2015 10:46        Mario Freeman MD [phone] 02-Jun-2015 10:46       CT lung 2018: Stable lung nodules and 1 mm non-obstructing stone at upper pole of left kidney    ASSESSMENT AND PLAN:  # GPA (dx 2013)  Presented with pulmonary and renal involvement. S/P Rituximab q6 month since 7341-9426 without flare of the disease. Her last serology on April 2018 were reassuring. As she has been through a quiescent course, it may be reasonable to space out and/or reduce the dosage of rituximab. Last dose of rituximab was Sept 2018 .   She remains in complete remission on therapy, with no signs of active disease.  Her ANCA is neg and peripheral B cells remain depleted.  I will continue to monitor these at regular intervals (q ~8 weeks) and redose RTX accordingly or if there are clinical signs of disease activity..    Mario Fernandes MD  Division of Renal Disease and Hypertension  Pager: [phone]  March 2, 2019              Again, thank you for allowing me to participate in the care of your  patient.      Sincerely,    Mario Fernandes MD

## 2019-03-02 NOTE — LETTER
Date:March 11, 2019      Patient was self referred, no letter generated. Do not send.        [facility] Health Information

## 2019-03-03 LAB
MYELOPEROXIDASE AB SER-ACNC: <0.2 AI (ref 0–0.9)
PROTEINASE3 IGG SER-ACNC: 0.2 AI (ref 0–0.9)

## 2019-04-12 NOTE — TELEPHONE ENCOUNTER
FUTURE VISIT INFORMATION      FUTURE VISIT INFORMATION:    Date: 5/8/19    Time: 3:00pm    Location: [facility]  REFERRAL INFORMATION:    Referring provider:  Dr. Mario Fernandes    Referring providers clinic:  [facility] Nephrology    Reason for visit/diagnosis:  Possible Rectocele     NOTES STATUS DETAILS   OFFICE NOTE from referring provider  Internal 4/12/19, 3/2/19, 11/17/18   OFFICE NOTE from other specialist   N/A    DISCHARGE SUMMARY FROM HOSPITAL N/A    DISCHARGE REPORT FROM ED N/A    OPERATIVE REPORT  N/A    PFC REPORT N/A    MEDICATION LIST Internal    LABS     FIT/STOOL TESTING Care Everywhere 8/5/15   ANAL PAP N/A    PATHOLOGY REPORTS RELATED TO DIAGNOSIS N/A    DIAGNOSTIC PROCEDURES     COLONOSCOPY N/A    ENDOSCOPY (EGD) N/A    ERCP N/A    ANOSCOPY N/A    FLEX SIGMOIDOSCOPY N/A    IMAGING & REPORT      CT, MRI, US, XR N/A

## 2019-04-15 ENCOUNTER — DOCUMENTATION ONLY (OUTPATIENT)
Dept: OTOLARYNGOLOGY | Facility: CLINIC | Age: 69
End: 2019-04-15

## 2019-04-15 ENCOUNTER — MYC MEDICAL ADVICE (OUTPATIENT)
Dept: NEPHROLOGY | Facility: CLINIC | Age: 69
End: 2019-04-15

## 2019-04-16 ENCOUNTER — DOCUMENTATION ONLY (OUTPATIENT)
Dept: OTOLARYNGOLOGY | Facility: CLINIC | Age: 69
End: 2019-04-16

## 2019-04-16 NOTE — PROGRESS NOTES
Called patient to let her know an appointment with  was scheduled for 5/8/19 at 1:30 pm. Patient stated she already knew about it.

## 2019-04-18 ENCOUNTER — OFFICE VISIT (OUTPATIENT)
Dept: NEPHROLOGY | Facility: CLINIC | Age: 69
End: 2019-04-18
Attending: INTERNAL MEDICINE
Payer: MEDICARE

## 2019-04-18 VITALS
OXYGEN SATURATION: 94 % | DIASTOLIC BLOOD PRESSURE: 81 MMHG | SYSTOLIC BLOOD PRESSURE: 125 MMHG | WEIGHT: 232 LBS | TEMPERATURE: 98.3 F | HEART RATE: 82 BPM | HEIGHT: 63 IN | BODY MASS INDEX: 41.11 KG/M2 | RESPIRATION RATE: 18 BRPM

## 2019-04-18 DIAGNOSIS — N30.00 ACUTE CYSTITIS WITHOUT HEMATURIA: ICD-10-CM

## 2019-04-18 DIAGNOSIS — I77.6 VASCULITIS (H): ICD-10-CM

## 2019-04-18 DIAGNOSIS — I77.6 VASCULITIS (H): Primary | ICD-10-CM

## 2019-04-18 DIAGNOSIS — N39.0 URINARY TRACT INFECTION WITHOUT HEMATURIA, SITE UNSPECIFIED: ICD-10-CM

## 2019-04-18 LAB
ALBUMIN SERPL-MCNC: 3.3 G/DL (ref 3.4–5)
ALBUMIN UR-MCNC: NEGATIVE MG/DL
ANION GAP SERPL CALCULATED.3IONS-SCNC: 5 MMOL/L (ref 3–14)
APPEARANCE UR: CLEAR
BASOPHILS # BLD AUTO: 0.1 10E9/L (ref 0–0.2)
BASOPHILS NFR BLD AUTO: 0.8 %
BILIRUB UR QL STRIP: NEGATIVE
BUN SERPL-MCNC: 21 MG/DL (ref 7–30)
CALCIUM SERPL-MCNC: 8.8 MG/DL (ref 8.5–10.1)
CHLORIDE SERPL-SCNC: 108 MMOL/L (ref 94–109)
CO2 SERPL-SCNC: 27 MMOL/L (ref 20–32)
COLOR UR AUTO: YELLOW
CREAT SERPL-MCNC: 0.94 MG/DL (ref 0.52–1.04)
CRP SERPL-MCNC: 19.9 MG/L (ref 0–8)
DIFFERENTIAL METHOD BLD: ABNORMAL
EOSINOPHIL # BLD AUTO: 0.4 10E9/L (ref 0–0.7)
EOSINOPHIL NFR BLD AUTO: 4 %
ERYTHROCYTE [DISTWIDTH] IN BLOOD BY AUTOMATED COUNT: 13.4 % (ref 10–15)
GFR SERPL CREATININE-BSD FRML MDRD: 62 ML/MIN/{1.73_M2}
GLUCOSE SERPL-MCNC: 90 MG/DL (ref 70–99)
GLUCOSE UR STRIP-MCNC: NEGATIVE MG/DL
HCT VFR BLD AUTO: 42.2 % (ref 35–47)
HGB BLD-MCNC: 13.2 G/DL (ref 11.7–15.7)
HGB UR QL STRIP: NEGATIVE
IMM GRANULOCYTES # BLD: 0 10E9/L (ref 0–0.4)
IMM GRANULOCYTES NFR BLD: 0.4 %
KETONES UR STRIP-MCNC: NEGATIVE MG/DL
LEUKOCYTE ESTERASE UR QL STRIP: ABNORMAL
LYMPHOCYTES # BLD AUTO: 1.9 10E9/L (ref 0.8–5.3)
LYMPHOCYTES NFR BLD AUTO: 17.7 %
MCH RBC QN AUTO: 28.9 PG (ref 26.5–33)
MCHC RBC AUTO-ENTMCNC: 31.3 G/DL (ref 31.5–36.5)
MCV RBC AUTO: 92 FL (ref 78–100)
MONOCYTES # BLD AUTO: 0.7 10E9/L (ref 0–1.3)
MONOCYTES NFR BLD AUTO: 7 %
MUCOUS THREADS #/AREA URNS LPF: PRESENT /LPF
NEUTROPHILS # BLD AUTO: 7.4 10E9/L (ref 1.6–8.3)
NEUTROPHILS NFR BLD AUTO: 70.1 %
NITRATE UR QL: NEGATIVE
NRBC # BLD AUTO: 0 10*3/UL
NRBC BLD AUTO-RTO: 0 /100
PH UR STRIP: 6 PH (ref 5–7)
PHOSPHATE SERPL-MCNC: 3 MG/DL (ref 2.5–4.5)
PLATELET # BLD AUTO: 329 10E9/L (ref 150–450)
POTASSIUM SERPL-SCNC: 4.3 MMOL/L (ref 3.4–5.3)
RBC # BLD AUTO: 4.57 10E12/L (ref 3.8–5.2)
RBC #/AREA URNS AUTO: 3 /HPF (ref 0–2)
SODIUM SERPL-SCNC: 139 MMOL/L (ref 133–144)
SOURCE: ABNORMAL
SP GR UR STRIP: 1.02 (ref 1–1.03)
SQUAMOUS #/AREA URNS AUTO: 1 /HPF (ref 0–1)
UROBILINOGEN UR STRIP-MCNC: 0 MG/DL (ref 0–2)
WBC # BLD AUTO: 10.6 10E9/L (ref 4–11)
WBC #/AREA URNS AUTO: 29 /HPF (ref 0–5)

## 2019-04-18 PROCEDURE — 81001 URINALYSIS AUTO W/SCOPE: CPT | Performed by: INTERNAL MEDICINE

## 2019-04-18 PROCEDURE — 80069 RENAL FUNCTION PANEL: CPT | Performed by: INTERNAL MEDICINE

## 2019-04-18 PROCEDURE — 83876 ASSAY MYELOPEROXIDASE: CPT | Performed by: INTERNAL MEDICINE

## 2019-04-18 PROCEDURE — 86140 C-REACTIVE PROTEIN: CPT | Performed by: INTERNAL MEDICINE

## 2019-04-18 PROCEDURE — G0463 HOSPITAL OUTPT CLINIC VISIT: HCPCS | Mod: ZF

## 2019-04-18 PROCEDURE — 83516 IMMUNOASSAY NONANTIBODY: CPT | Performed by: INTERNAL MEDICINE

## 2019-04-18 PROCEDURE — 86355 B CELLS TOTAL COUNT: CPT | Performed by: INTERNAL MEDICINE

## 2019-04-18 PROCEDURE — 87086 URINE CULTURE/COLONY COUNT: CPT | Performed by: INTERNAL MEDICINE

## 2019-04-18 PROCEDURE — 85025 COMPLETE CBC W/AUTO DIFF WBC: CPT | Performed by: INTERNAL MEDICINE

## 2019-04-18 PROCEDURE — 36415 COLL VENOUS BLD VENIPUNCTURE: CPT | Performed by: INTERNAL MEDICINE

## 2019-04-18 RX ORDER — AMOXICILLIN AND CLAVULANATE POTASSIUM 500; 125 MG/1; MG/1
1 TABLET, FILM COATED ORAL 2 TIMES DAILY
Qty: 14 TABLET | Refills: 0 | Status: SHIPPED | OUTPATIENT
Start: 2019-04-18 | End: 2019-05-08

## 2019-04-18 ASSESSMENT — MIFFLIN-ST. JEOR: SCORE: 1551.48

## 2019-04-18 ASSESSMENT — PAIN SCALES - GENERAL: PAINLEVEL: SEVERE PAIN (6)

## 2019-04-18 NOTE — LETTER
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 She was hospitalized x 10 days in [place]. Initially, hemoptysis attributed to pneumonia. She mentioned that her lung was \"messed up enough\" that they could not perform a (transbronchial) lung biopsy. She was put on prednisone and tapered off eventually. While she was at [place], the diagnosis of Wegener's was not confirmed.  March 2013:  Back in [place].  Admitted to [facility] with acute renal failure and scant hemoptysis.  She had significant fatigue, oral ulcers, scant purpura on the forearms ( \"months\" after finishing the course of prednisone).  She did not require dialysis.  Dx of \"Wegener's granulomatosis\"  -> plasmapheresis (x 10+ sessions?) under the care of Dr Lowry (nephrology) .  She received prednisone and was treated with Rituximab once per week for 4 weeks.  April 2013 - 2018  : She was on prednisone for around 4 months. She experienced a lot of side effect including weight gain, mood changes, cushingoid facies.  She denies have hyperglycemia.  She was tapered off prednisone She then received rituximab every 6 months (1000 mg)       Treatment summary  March 2013: Plasmapheresis + Rituximab  Rituximab 1000 mg IV every 6 month since initial treatment in 2013 SEPTEMBER 7, 2018: Last dose of Rituximab 1000 mg. (8months after previous dose)  " "    Interval summary  Her main question at this time pertains to the need for continued maintenance therapy with rituximab.  She asks about her risk for relapse.  Since she has been getting rituximab since the onset of the disease, she is not sure about how to move forward. She endorses having joint pain all of the time from multiple kinds of arthritis. She denies nose bleeding/hair loss/mouth sore but would have periodic nose congestion. Overall, she did not have any official flare' of the disease since March 2013. She only has the aforementioned symptoms which she thinks it could be related to cold or other minor illness. Reported feeling good 8/10. The bothersome symptoms were plantar fasciitis s/p steroid injection and back pain. She has not been using CPAP because she was not satisfied with the encounter with sleep providers.    3/2/19  Had a good trip to [place].  Reports no acute illness since the last visit.  Has hot flashes (old)  Dry nose,  No epistaxis.  No oral ulcers.  Cough, with post nasal drip..  Clear but occasional yellow  No F/Chills  Energy:  \" I'm lazy\"  No CP.  No GI symptoms.  No  symptoms.    4/18/19  She feels poorly, in multiple   She is more tired and achy.  \"all joints are hurting\" plantar fasciits,  Thumbs and knees are hurting (also chronic)  More night sweats over the last 2 weeks.  No F/chills.  NO oral ulcers.  Cough is a little worse from sinus drainage.  More back aches.  One blister on external surface of R arm near elbow, which is now dry and scaly.  She admits to some anxiety because this is the 5th anniversary of her hospitalization.      PAST MEDICAL HISTORY:  Past Medical History:   Diagnosis Date     DVT (deep venous thrombosis) (H) 1990    occured 1 month after starting OCP     GERD (gastroesophageal reflux disease)      Hyperuricemia      Hypothyroid      Obesity (BMI 35.0-39.9 without comorbidity)      Venous insufficiency (chronic) (peripheral)      Wegener's granulomatosis " with renal involvement (H)     , No history of complicated pregnancies.  Last pregnancy with twins       PAST SURGICAL HISTORY:  Past Surgical History:   Procedure Laterality Date     HYSTERECTOMY       TUBAL LIGATION            MEDICATIONS:  Prescription Medications as of 3/2/2019       Rx Number Disp Refills Start End Last Dispensed Date Next Fill Date Owning Pharmacy    levothyroxine (SYNTHROID/LEVOTHROID) 112 MCG tablet            Sig: Take 112 mcg by mouth    Class: Historical    Route: Oral    LACTOBACILLUS PO            Si tablet    Class: Historical    valACYclovir (VALTREX) 1000 mg tablet    2018        Class: HistoricalNOT TAKING        ALLERGIES:    No Known Allergies     REVIEW OF SYSTEMS:  A comprehensive review of systems was performed and found to be negative except as described here or above.   SOCIAL HISTORY:   Social History     Socioeconomic History     Marital status:      Spouse name: Not on file     Number of children: Not on file     Years of education: Not on file     Highest education level: Not on file   Occupational History     Not on file   Social Needs     Financial resource strain: Not on file     Food insecurity:     Worry: Not on file     Inability: Not on file     Transportation needs:     Medical: Not on file     Non-medical: Not on file   Tobacco Use     Smoking status: Never Smoker     Smokeless tobacco: Never Used   Substance and Sexual Activity     Alcohol use: No     Drug use: No     Sexual activity: Yes     Partners: Male   Lifestyle     Physical activity:     Days per week: Not on file     Minutes per session: Not on file     Stress: Not on file   Relationships     Social connections:     Talks on phone: Not on file     Gets together: Not on file     Attends Religion service: Not on file     Active member of club or organization: Not on file     Attends meetings of clubs or organizations: Not on file     Relationship status: Not on file     Intimate  "partner violence:     Fear of current or ex partner: Not on file     Emotionally abused: Not on file     Physically abused: Not on file     Forced sexual activity: Not on file   Other Topics Concern     Not on file   Social History Narrative     Not on file     Lives in [place]  Family is in town in [place]     FAMILY MEDICAL HISTORY:   Has 4 children, Daughter 48, Daughter 47, fraternal twin : daughter and son age 46    PHYSICAL EXAM:   /81 (BP Location: Right arm, Patient Position: Sitting, Cuff Size: Adult Large)   Pulse 82   Temp 98.3  F (36.8  C) (Oral)   Resp 18   Ht 1.6 m (5' 3\")   Wt 105.2 kg (232 lb)   SpO2 94%   BMI 41.10 kg/m     GENERAL APPEARANCE: alert and no distress, joyful  EYES: nonicteric  HENT: mouth without ulcers or lesions, TM without erythema  NECK: supple, no adenopathy  RESP: lungs clear to auscultation   CV: regular rhythm, normal rate, no rub  ABDOMEN: soft, nontender, normal bowel sounds  Extremities: no clubbing, cyanosis, trace pitting edema left>right  MS: no evidence of inflammation in joints, no muscle tenderness  SKIN: no rash  NEURO: mentation intact and speech normal  PSYCH: affect normal/bright   LABS:       Result See Note 4/11/18  Comment:  KELSEY BRISCOE  MRN:    [id number]  Myeloperoxidase Antibody 0   Comment:  INTERPRETIVE INFORMATION: Myeloperoxidase Abs, IgG    19 AU/mL or Less ......... Negative    20-25 AU/mL .............. Equivocal    26 AU/mL or Greater ...... Positive       Serine Protease 3 Antibody  Test                          Result   Units   Ref. Interval  Serine Protease3, IgG         4        AU/mL   0-19  INTERPRETIVE INFORMATION: Serine Protease 3, IgG  19 AU/mL or Less ........ Negative  20-25 AU/mL ............. Equivocal  26 AU/mL or Greater ..... Positive     Recent Results (from the past 168 hour(s))   CRP inflammation    Collection Time: 04/18/19  5:38 PM   Result Value Ref Range    CRP Inflammation 19.9 (H) 0.0 - 8.0 mg/L "   Renal panel    Collection Time: 04/18/19  5:38 PM   Result Value Ref Range    Sodium 139 133 - 144 mmol/L    Potassium 4.3 3.4 - 5.3 mmol/L    Chloride 108 94 - 109 mmol/L    Carbon Dioxide 27 20 - 32 mmol/L    Anion Gap 5 3 - 14 mmol/L    Glucose 90 70 - 99 mg/dL    Urea Nitrogen 21 7 - 30 mg/dL    Creatinine 0.94 0.52 - 1.04 mg/dL    GFR Estimate 62 >60 mL/min/[1.73_m2]    GFR Estimate If Black 72 >60 mL/min/[1.73_m2]    Calcium 8.8 8.5 - 10.1 mg/dL    Phosphorus 3.0 2.5 - 4.5 mg/dL    Albumin 3.3 (L) 3.4 - 5.0 g/dL   CBC with platelets differential    Collection Time: 04/18/19  5:38 PM   Result Value Ref Range    WBC 10.6 4.0 - 11.0 10e9/L    RBC Count 4.57 3.8 - 5.2 10e12/L    Hemoglobin 13.2 11.7 - 15.7 g/dL    Hematocrit 42.2 35.0 - 47.0 %    MCV 92 78 - 100 fl    MCH 28.9 26.5 - 33.0 pg    MCHC 31.3 (L) 31.5 - 36.5 g/dL    RDW 13.4 10.0 - 15.0 %    Platelet Count 329 150 - 450 10e9/L    Diff Method Automated Method     % Neutrophils 70.1 %    % Lymphocytes 17.7 %    % Monocytes 7.0 %    % Eosinophils 4.0 %    % Basophils 0.8 %    % Immature Granulocytes 0.4 %    Nucleated RBCs 0 0 /100    Absolute Neutrophil 7.4 1.6 - 8.3 10e9/L    Absolute Lymphocytes 1.9 0.8 - 5.3 10e9/L    Absolute Monocytes 0.7 0.0 - 1.3 10e9/L    Absolute Eosinophils 0.4 0.0 - 0.7 10e9/L    Absolute Basophils 0.1 0.0 - 0.2 10e9/L    Abs Immature Granulocytes 0.0 0 - 0.4 10e9/L    Absolute Nucleated RBC 0.0    Routine UA with micro reflex to culture    Collection Time: 04/18/19  5:45 PM   Result Value Ref Range    Color Urine Yellow     Appearance Urine Clear     Glucose Urine Negative NEG^Negative mg/dL    Bilirubin Urine Negative NEG^Negative    Ketones Urine Negative NEG^Negative mg/dL    Specific Gravity Urine 1.020 1.003 - 1.035    Blood Urine Negative NEG^Negative    pH Urine 6.0 5.0 - 7.0 pH    Protein Albumin Urine Negative NEG^Negative mg/dL    Urobilinogen mg/dL 0.0 0.0 - 2.0 mg/dL    Nitrite Urine Negative NEG^Negative     Leukocyte Esterase Urine Large (A) NEG^Negative    Source Midstream Urine     WBC Urine 29 (H) 0 - 5 /HPF    RBC Urine 3 (H) 0 - 2 /HPF    Squamous Epithelial /HPF Urine 1 0 - 1 /HPF    Mucous Urine Present (A) NEG^Negative /LPF   Urine Culture Aerobic Bacterial    Collection Time: 04/18/19  5:45 PM   Result Value Ref Range    Specimen Description Midstream Urine     Special Requests Specimen received in preservative     Culture Micro PENDING    Urine Culture Aerobic Bacterial    Collection Time: 04/18/19  5:45 PM   Result Value Ref Range    Specimen Description Midstream Urine     Special Requests Specimen received in preservative     Culture Micro PENDING      Albumin   Date Value Ref Range Status   04/18/2019 3.3 (L) 3.4 - 5.0 g/dL Final   03/02/2019 3.2 (L) 3.4 - 5.0 g/dL Final   01/19/2019 3.3 (L) 3.4 - 5.0 g/dL Final         URINE STUDIES  Recent Labs   Lab Test 04/18/19  1745 03/02/19  1106 11/17/18  0756   COLOR Yellow Yellow Yellow   APPEARANCE Clear Slightly Cloudy Clear   URINEGLC Negative Negative Negative   URINEBILI Negative Negative Negative   URINEKETONE Negative Negative Negative   SG 1.020 1.020 1.018   UBLD Negative Small* Small*   URINEPH 6.0 5.0 5.0   PROTEIN Negative Negative Negative   NITRITE Negative Negative Negative   LEUKEST Large* Large* Small*   RBCU 3* 5* 1   WBCU 29* 15* 2         CT Chest without IV contrast 6/2/2015  Result Impression     1. 8 mm nodule, with adjacent smaller nodules, in the left lower lobe has been stable since outside chest CT 04/21/2014. Recommend follow-up imaging in 6-12 months.   2. Stable bilateral patchy ground glass opacities, likely due to shallow inspiration.   3. Mild right hilar adenopathy, which is likely reactive.     FINDINGS:  Stable 8 mm nodule in the superior segment of the left lower lobe; this nodule has been stable since outside chest CT 04/21/2014. The two surrounding smaller nodules are relatively stable in appearance since 12/01/2014 (these  nodules were obscured on outside chest CT). No new pulmonary nodules. Stable scattered groundglass opacities in both lungs which may be due to shallow inspiration. Tiny calcified granuloma right lung apex. Trace amount of atelectasis in the lingula. Scattered mediastinal and left hilar lymph nodes are more prominent on today's exam, however remain within normal limits for size. New mild right hilar adenopathy, with largest node measuring approximately 1 cm in maximal diameter.     Electronically signed by:    MAGALIS Vargas -[id number] 02-Jun-2015 10:46        Mario Freeman MD [id number] 02-Jun-2015 10:46       CT lung 2018: Stable lung nodules and 1 mm non-obstructing stone at upper pole of left kidney    ASSESSMENT AND PLAN:  # GPA (dx 2013)  Presented with pulmonary and renal involvement. S/P Rituximab q6 month since [id number] without flare of the disease. Her last serology on April 2018 were reassuring. As she has been through a quiescent course, it may be reasonable to space out and/or reduce the dosage of rituximab. Last dose of rituximab was Sept 2018 .   She remains in complete remission on therapy, with no signs of active disease.  She presents today with non-specific complaints - many related to chronic arthralgias; and it is very possible that her feeling under the weather may be related to a UTI.  She also acknowledged spontaneously that part of her anxiety about getting rituximab stems from the fact that Radha is the anniversary of her first hospitalization with severe vasculitis.  I discussed with her that, since she has not had a relapse in 6 years, it was perhaps time to considering spacing out or even stopping immunosuppression.  This thought is clearly causing some anxiety to her.  Nevertheless, we agreed that we would only proceed with a dose of rituximab if the ANCA/B cells are back. We also discussed getting labs more frequently (e.g. Q 6 weeks to alleviate her anxiety )  I will  Treat for presumptive  UTI  starting with augmentin 500 BID x 7 days.  I will await the result of the the ANCA and peripheral B cells to decide on the timing of the next dose of rituximab.    Mario Fernandes MD  Division of Renal Disease and Hypertension  Pager: [phone]  April 18, 2019                Mario Fernandes MD

## 2019-04-18 NOTE — LETTER
"4/18/2019       RE: Waleska Veronica  [address]  [place] [address]     Dear Colleague,    Thank you for referring your patient, Waleska Veronica, to the [facility] NEPHROLOGY at [facility]. Please see a copy of my visit note below.    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 She was hospitalized x 10 days in [place]. Initially, hemoptysis attributed to pneumonia. She mentioned that her lung was \"messed up enough\" that they could not perform a (transbronchial) lung biopsy. She was put on prednisone and tapered off eventually. While she was at [place], the diagnosis of Wegener's was not confirmed.  March 2013:  Back in [place].  Admitted to [facility] with acute renal failure and scant hemoptysis.  She had significant fatigue, oral ulcers, scant purpura on the forearms ( \"months\" after finishing the course of prednisone).  She did not require dialysis.  Dx of \"Wegener's granulomatosis\"  -> plasmapheresis (x 10+ sessions?) under the care of Dr Lowry (nephrology) .  She received prednisone and was treated with Rituximab once per week for 4 weeks.  April 2013 - 2018  : She was on prednisone for around 4 months. She experienced a lot of side effect including weight gain, mood changes, cushingoid facies.  She denies have hyperglycemia.  She was tapered off prednisone She then received rituximab every 6 months (1000 mg)       Treatment " "summary  March 2013: Plasmapheresis + Rituximab  Rituximab 1000 mg IV every 6 month since initial treatment in 2013 SEPTEMBER 7, 2018: Last dose of Rituximab 1000 mg. (8months after previous dose)      Interval summary  Her main question at this time pertains to the need for continued maintenance therapy with rituximab.  She asks about her risk for relapse.  Since she has been getting rituximab since the onset of the disease, she is not sure about how to move forward. She endorses having joint pain all of the time from multiple kinds of arthritis. She denies nose bleeding/hair loss/mouth sore but would have periodic nose congestion. Overall, she did not have any official flare' of the disease since March 2013. She only has the aforementioned symptoms which she thinks it could be related to cold or other minor illness. Reported feeling good 8/10. The bothersome symptoms were plantar fasciitis s/p steroid injection and back pain. She has not been using CPAP because she was not satisfied with the encounter with sleep providers.    3/2/19  Had a good trip to [place].  Reports no acute illness since the last visit.  Has hot flashes (old)  Dry nose,  No epistaxis.  No oral ulcers.  Cough, with post nasal drip..  Clear but occasional yellow  No F/Chills  Energy:  \" I'm lazy\"  No CP.  No GI symptoms.  No  symptoms.    4/18/19  She feels poorly, in multiple   She is more tired and achy.  \"all joints are hurting\" plantar fasciits,  Thumbs and knees are hurting (also chronic)  More night sweats over the last 2 weeks.  No F/chills.  NO oral ulcers.  Cough is a little worse from sinus drainage.  More back aches.  One blister on external surface of R arm near elbow, which is now dry and scaly.  She admits to some anxiety because this is the 5th anniversary of her hospitalization.      PAST MEDICAL HISTORY:  Past Medical History:   Diagnosis Date     DVT (deep venous thrombosis) (H) 1990    occured 1 month after starting OCP     " GERD (gastroesophageal reflux disease)      Hyperuricemia      Hypothyroid      Obesity (BMI 35.0-39.9 without comorbidity)      Venous insufficiency (chronic) (peripheral)      Wegener's granulomatosis with renal involvement (H)     , No history of complicated pregnancies.  Last pregnancy with twins       PAST SURGICAL HISTORY:  Past Surgical History:   Procedure Laterality Date     HYSTERECTOMY       TUBAL LIGATION            MEDICATIONS:  Prescription Medications as of 3/2/2019       Rx Number Disp Refills Start End Last Dispensed Date Next Fill Date Owning Pharmacy    levothyroxine (SYNTHROID/LEVOTHROID) 112 MCG tablet            Sig: Take 112 mcg by mouth    Class: Historical    Route: Oral    LACTOBACILLUS PO            Si tablet    Class: Historical    valACYclovir (VALTREX) 1000 mg tablet    2018        Class: HistoricalNOT TAKING        ALLERGIES:    No Known Allergies     REVIEW OF SYSTEMS:  A comprehensive review of systems was performed and found to be negative except as described here or above.   SOCIAL HISTORY:   Social History     Socioeconomic History     Marital status:      Spouse name: Not on file     Number of children: Not on file     Years of education: Not on file     Highest education level: Not on file   Occupational History     Not on file   Social Needs     Financial resource strain: Not on file     Food insecurity:     Worry: Not on file     Inability: Not on file     Transportation needs:     Medical: Not on file     Non-medical: Not on file   Tobacco Use     Smoking status: Never Smoker     Smokeless tobacco: Never Used   Substance and Sexual Activity     Alcohol use: No     Drug use: No     Sexual activity: Yes     Partners: Male   Lifestyle     Physical activity:     Days per week: Not on file     Minutes per session: Not on file     Stress: Not on file   Relationships     Social connections:     Talks on phone: Not on file     Gets together: Not on file      "Attends Protestant service: Not on file     Active member of club or organization: Not on file     Attends meetings of clubs or organizations: Not on file     Relationship status: Not on file     Intimate partner violence:     Fear of current or ex partner: Not on file     Emotionally abused: Not on file     Physically abused: Not on file     Forced sexual activity: Not on file   Other Topics Concern     Not on file   Social History Narrative     Not on file     Lives in [place]  Family is in town in [place]     FAMILY MEDICAL HISTORY:   Has 4 children, Daughter 48, Daughter 47, fraternal twin : daughter and son age 46    PHYSICAL EXAM:   /81 (BP Location: Right arm, Patient Position: Sitting, Cuff Size: Adult Large)   Pulse 82   Temp 98.3  F (36.8  C) (Oral)   Resp 18   Ht 1.6 m (5' 3\")   Wt 105.2 kg (232 lb)   SpO2 94%   BMI 41.10 kg/m     GENERAL APPEARANCE: alert and no distress, joyful  EYES: nonicteric  HENT: mouth without ulcers or lesions, TM without erythema  NECK: supple, no adenopathy  RESP: lungs clear to auscultation   CV: regular rhythm, normal rate, no rub  ABDOMEN: soft, nontender, normal bowel sounds  Extremities: no clubbing, cyanosis, trace pitting edema left>right  MS: no evidence of inflammation in joints, no muscle tenderness  SKIN: no rash  NEURO: mentation intact and speech normal  PSYCH: affect normal/bright   LABS:       Result See Note 4/11/18  Comment:  RACHNA KELSEY WENDI  MRN:    [id number]  Myeloperoxidase Antibody 0   Comment:  INTERPRETIVE INFORMATION: Myeloperoxidase Abs, IgG    19 AU/mL or Less ......... Negative    20-25 AU/mL .............. Equivocal    26 AU/mL or Greater ...... Positive       Serine Protease 3 Antibody  Test                          Result   Units   Ref. Interval  Serine Protease3, IgG         4        AU/mL   0-19  INTERPRETIVE INFORMATION: Serine Protease 3, IgG  19 AU/mL or Less ........ Negative  20-25 AU/mL ............. Equivocal  26 AU/mL " or Greater ..... Positive     Recent Results (from the past 168 hour(s))   CRP inflammation    Collection Time: 04/18/19  5:38 PM   Result Value Ref Range    CRP Inflammation 19.9 (H) 0.0 - 8.0 mg/L   Renal panel    Collection Time: 04/18/19  5:38 PM   Result Value Ref Range    Sodium 139 133 - 144 mmol/L    Potassium 4.3 3.4 - 5.3 mmol/L    Chloride 108 94 - 109 mmol/L    Carbon Dioxide 27 20 - 32 mmol/L    Anion Gap 5 3 - 14 mmol/L    Glucose 90 70 - 99 mg/dL    Urea Nitrogen 21 7 - 30 mg/dL    Creatinine 0.94 0.52 - 1.04 mg/dL    GFR Estimate 62 >60 mL/min/[1.73_m2]    GFR Estimate If Black 72 >60 mL/min/[1.73_m2]    Calcium 8.8 8.5 - 10.1 mg/dL    Phosphorus 3.0 2.5 - 4.5 mg/dL    Albumin 3.3 (L) 3.4 - 5.0 g/dL   CBC with platelets differential    Collection Time: 04/18/19  5:38 PM   Result Value Ref Range    WBC 10.6 4.0 - 11.0 10e9/L    RBC Count 4.57 3.8 - 5.2 10e12/L    Hemoglobin 13.2 11.7 - 15.7 g/dL    Hematocrit 42.2 35.0 - 47.0 %    MCV 92 78 - 100 fl    MCH 28.9 26.5 - 33.0 pg    MCHC 31.3 (L) 31.5 - 36.5 g/dL    RDW 13.4 10.0 - 15.0 %    Platelet Count 329 150 - 450 10e9/L    Diff Method Automated Method     % Neutrophils 70.1 %    % Lymphocytes 17.7 %    % Monocytes 7.0 %    % Eosinophils 4.0 %    % Basophils 0.8 %    % Immature Granulocytes 0.4 %    Nucleated RBCs 0 0 /100    Absolute Neutrophil 7.4 1.6 - 8.3 10e9/L    Absolute Lymphocytes 1.9 0.8 - 5.3 10e9/L    Absolute Monocytes 0.7 0.0 - 1.3 10e9/L    Absolute Eosinophils 0.4 0.0 - 0.7 10e9/L    Absolute Basophils 0.1 0.0 - 0.2 10e9/L    Abs Immature Granulocytes 0.0 0 - 0.4 10e9/L    Absolute Nucleated RBC 0.0    Routine UA with micro reflex to culture    Collection Time: 04/18/19  5:45 PM   Result Value Ref Range    Color Urine Yellow     Appearance Urine Clear     Glucose Urine Negative NEG^Negative mg/dL    Bilirubin Urine Negative NEG^Negative    Ketones Urine Negative NEG^Negative mg/dL    Specific Gravity Urine 1.020 1.003 - 1.035     Blood Urine Negative NEG^Negative    pH Urine 6.0 5.0 - 7.0 pH    Protein Albumin Urine Negative NEG^Negative mg/dL    Urobilinogen mg/dL 0.0 0.0 - 2.0 mg/dL    Nitrite Urine Negative NEG^Negative    Leukocyte Esterase Urine Large (A) NEG^Negative    Source Midstream Urine     WBC Urine 29 (H) 0 - 5 /HPF    RBC Urine 3 (H) 0 - 2 /HPF    Squamous Epithelial /HPF Urine 1 0 - 1 /HPF    Mucous Urine Present (A) NEG^Negative /LPF   Urine Culture Aerobic Bacterial    Collection Time: 04/18/19  5:45 PM   Result Value Ref Range    Specimen Description Midstream Urine     Special Requests Specimen received in preservative     Culture Micro PENDING    Urine Culture Aerobic Bacterial    Collection Time: 04/18/19  5:45 PM   Result Value Ref Range    Specimen Description Midstream Urine     Special Requests Specimen received in preservative     Culture Micro PENDING      Albumin   Date Value Ref Range Status   04/18/2019 3.3 (L) 3.4 - 5.0 g/dL Final   03/02/2019 3.2 (L) 3.4 - 5.0 g/dL Final   01/19/2019 3.3 (L) 3.4 - 5.0 g/dL Final         URINE STUDIES  Recent Labs   Lab Test 04/18/19  1745 03/02/19  1106 11/17/18  0756   COLOR Yellow Yellow Yellow   APPEARANCE Clear Slightly Cloudy Clear   URINEGLC Negative Negative Negative   URINEBILI Negative Negative Negative   URINEKETONE Negative Negative Negative   SG 1.020 1.020 1.018   UBLD Negative Small* Small*   URINEPH 6.0 5.0 5.0   PROTEIN Negative Negative Negative   NITRITE Negative Negative Negative   LEUKEST Large* Large* Small*   RBCU 3* 5* 1   WBCU 29* 15* 2         CT Chest without IV contrast 6/2/2015  Result Impression     1. 8 mm nodule, with adjacent smaller nodules, in the left lower lobe has been stable since outside chest CT 04/21/2014. Recommend follow-up imaging in 6-12 months.   2. Stable bilateral patchy ground glass opacities, likely due to shallow inspiration.   3. Mild right hilar adenopathy, which is likely reactive.     FINDINGS:  Stable 8 mm nodule in the  superior segment of the left lower lobe; this nodule has been stable since outside chest CT 04/21/2014. The two surrounding smaller nodules are relatively stable in appearance since 12/01/2014 (these nodules were obscured on outside chest CT). No new pulmonary nodules. Stable scattered groundglass opacities in both lungs which may be due to shallow inspiration. Tiny calcified granuloma right lung apex. Trace amount of atelectasis in the lingula. Scattered mediastinal and left hilar lymph nodes are more prominent on today's exam, however remain within normal limits for size. New mild right hilar adenopathy, with largest node measuring approximately 1 cm in maximal diameter.     Electronically signed by:    MAGALIS Vargas -[id number] 02-Jun-2015 10:46        Mario Freeman MD [phone] 02-Jun-2015 10:46       CT lung 2018: Stable lung nodules and 1 mm non-obstructing stone at upper pole of left kidney    ASSESSMENT AND PLAN:  # GPA (dx 2013)  Presented with pulmonary and renal involvement. S/P Rituximab q6 month since 0961-3179 without flare of the disease. Her last serology on April 2018 were reassuring. As she has been through a quiescent course, it may be reasonable to space out and/or reduce the dosage of rituximab. Last dose of rituximab was Sept 2018 .   She remains in complete remission on therapy, with no signs of active disease.  She presents today with non-specific complaints - many related to chronic arthralgias; and it is very possible that her feeling under the weather may be related to a UTI.  She also acknowledged spontaneously that part of her anxiety about getting rituximab stems from the fact that [facility] is the anniversary of her first hospitalization with severe vasculitis.  I discussed with her that, since she has not had a relapse in 6 years, it was perhaps time to considering spacing out or even stopping immunosuppression.  This thought is clearly causing some anxiety to her.  Nevertheless, we agreed that we  would only proceed with a dose of rituximab if the ANCA/B cells are back. We also discussed getting labs more frequently (e.g. Q 6 weeks to alleviate her anxiety )  I will  Treat for presumptive  UTI starting with augmentin 500 BID x 7 days.  I will await the result of the the ANCA and peripheral B cells to decide on the timing of the next dose of rituximab.    Mario Fernandes MD  Division of Renal Disease and Hypertension  Pager: [phone]  April 18, 2019                Again, thank you for allowing me to participate in the care of your patient.      Sincerely,    Mario Fernandes MD

## 2019-04-18 NOTE — NURSING NOTE
"Chief Complaint   Patient presents with     RECHECK     ANCA Vasculitis       Vital signs:  Temp: 98.3  F (36.8  C) Temp src: Oral BP: 125/81 Pulse: 82   Resp: 18 SpO2: 94 %     Height: 160 cm (5' 3\") Weight: 105.2 kg (232 lb)  Estimated body mass index is 41.1 kg/m  as calculated from the following:    Height as of this encounter: 1.6 m (5' 3\").    Weight as of this encounter: 105.2 kg (232 lb).          Dodie Castillo [facility]  4/18/2019 4:35 PM      "

## 2019-04-18 NOTE — PATIENT INSTRUCTIONS
Preventive Care:    Colorectal Cancer Screening: During our visit today, we discussed that it is recommended you receive colorectal cancer screening. Please call or make an appointment with your primary care provider to discuss this. You may also call the [employer] scheduling line ([phone]) to set up a colonoscopy appointment.

## 2019-04-18 NOTE — LETTER
Date:April 19, 2019      Patient was self referred, no letter generated. Do not send.        [facility] Health Information

## 2019-04-18 NOTE — TELEPHONE ENCOUNTER
Pt seen by Dr. Fernandes today.  Yaa CORTEZ LPN called to set up appt.    Ailyn Gonzalez RN  Rheumatology Clinic

## 2019-04-18 NOTE — PROGRESS NOTES
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 She was hospitalized x 10 days in [place]. Initially, hemoptysis attributed to pneumonia. She mentioned that her lung was \"messed up enough\" that they could not perform a (transbronchial) lung biopsy. She was put on prednisone and tapered off eventually. While she was at [place], the diagnosis of Wegener's was not confirmed.  March 2013:  Back in [place].  Admitted to [facility] with acute renal failure and scant hemoptysis.  She had significant fatigue, oral ulcers, scant purpura on the forearms ( \"months\" after finishing the course of prednisone).  She did not require dialysis.  Dx of \"Wegener's granulomatosis\"  -> plasmapheresis (x 10+ sessions?) under the care of Dr Lowry (nephrology) .  She received prednisone and was treated with Rituximab once per week for 4 weeks.  April 2013 - 2018  : She was on prednisone for around 4 months. She experienced a lot of side effect including weight gain, mood changes, cushingoid facies.  She denies have hyperglycemia.  She was tapered off prednisone She then received rituximab every 6 months (1000 mg)       Treatment summary  March 2013: Plasmapheresis + Rituximab  Rituximab 1000 mg IV every 6 month since initial treatment in 2013 SEPTEMBER 7, 2018: Last dose of Rituximab 1000 mg. (8months after previous dose)      Interval summary  Her main question at this time pertains to the need for " "continued maintenance therapy with rituximab.  She asks about her risk for relapse.  Since she has been getting rituximab since the onset of the disease, she is not sure about how to move forward. She endorses having joint pain all of the time from multiple kinds of arthritis. She denies nose bleeding/hair loss/mouth sore but would have periodic nose congestion. Overall, she did not have any official flare' of the disease since 2013. She only has the aforementioned symptoms which she thinks it could be related to cold or other minor illness. Reported feeling good 8/10. The bothersome symptoms were plantar fasciitis s/p steroid injection and back pain. She has not been using CPAP because she was not satisfied with the encounter with sleep providers.    3/2/19  Had a good trip to [place].  Reports no acute illness since the last visit.  Has hot flashes (old)  Dry nose,  No epistaxis.  No oral ulcers.  Cough, with post nasal drip..  Clear but occasional yellow  No F/Chills  Energy:  \" I'm lazy\"  No CP.  No GI symptoms.  No  symptoms.    19  She feels poorly, in multiple   She is more tired and achy.  \"all joints are hurting\" plantar fasciits,  Thumbs and knees are hurting (also chronic)  More night sweats over the last 2 weeks.  No F/chills.  NO oral ulcers.  Cough is a little worse from sinus drainage.  More back aches.  One blister on external surface of R arm near elbow, which is now dry and scaly.  She admits to some anxiety because this is the 5th anniversary of her hospitalization.      PAST MEDICAL HISTORY:  Past Medical History:   Diagnosis Date     DVT (deep venous thrombosis) (H)     occured 1 month after starting OCP     GERD (gastroesophageal reflux disease)      Hyperuricemia      Hypothyroid      Obesity (BMI 35.0-39.9 without comorbidity)      Venous insufficiency (chronic) (peripheral)      Wegener's granulomatosis with renal involvement (H)     , No history of complicated pregnancies.  " Last pregnancy with twins       PAST SURGICAL HISTORY:  Past Surgical History:   Procedure Laterality Date     HYSTERECTOMY       TUBAL LIGATION            MEDICATIONS:  Prescription Medications as of 3/2/2019       Rx Number Disp Refills Start End Last Dispensed Date Next Fill Date Owning Pharmacy    levothyroxine (SYNTHROID/LEVOTHROID) 112 MCG tablet            Sig: Take 112 mcg by mouth    Class: Historical    Route: Oral    LACTOBACILLUS PO            Si tablet    Class: Historical    valACYclovir (VALTREX) 1000 mg tablet    2018        Class: HistoricalNOT TAKING        ALLERGIES:    No Known Allergies     REVIEW OF SYSTEMS:  A comprehensive review of systems was performed and found to be negative except as described here or above.   SOCIAL HISTORY:   Social History     Socioeconomic History     Marital status:      Spouse name: Not on file     Number of children: Not on file     Years of education: Not on file     Highest education level: Not on file   Occupational History     Not on file   Social Needs     Financial resource strain: Not on file     Food insecurity:     Worry: Not on file     Inability: Not on file     Transportation needs:     Medical: Not on file     Non-medical: Not on file   Tobacco Use     Smoking status: Never Smoker     Smokeless tobacco: Never Used   Substance and Sexual Activity     Alcohol use: No     Drug use: No     Sexual activity: Yes     Partners: Male   Lifestyle     Physical activity:     Days per week: Not on file     Minutes per session: Not on file     Stress: Not on file   Relationships     Social connections:     Talks on phone: Not on file     Gets together: Not on file     Attends Restorationist service: Not on file     Active member of club or organization: Not on file     Attends meetings of clubs or organizations: Not on file     Relationship status: Not on file     Intimate partner violence:     Fear of current or ex partner: Not on file      "Emotionally abused: Not on file     Physically abused: Not on file     Forced sexual activity: Not on file   Other Topics Concern     Not on file   Social History Narrative     Not on file     Lives in [place]  Family is in town in [place]     FAMILY MEDICAL HISTORY:   Has 4 children, Daughter 48, Daughter 47, fraternal twin : daughter and son age 46    PHYSICAL EXAM:   /81 (BP Location: Right arm, Patient Position: Sitting, Cuff Size: Adult Large)   Pulse 82   Temp 98.3  F (36.8  C) (Oral)   Resp 18   Ht 1.6 m (5' 3\")   Wt 105.2 kg (232 lb)   SpO2 94%   BMI 41.10 kg/m    GENERAL APPEARANCE: alert and no distress, joyful  EYES: nonicteric  HENT: mouth without ulcers or lesions, TM without erythema  NECK: supple, no adenopathy  RESP: lungs clear to auscultation   CV: regular rhythm, normal rate, no rub  ABDOMEN: soft, nontender, normal bowel sounds  Extremities: no clubbing, cyanosis, trace pitting edema left>right  MS: no evidence of inflammation in joints, no muscle tenderness  SKIN: no rash  NEURO: mentation intact and speech normal  PSYCH: affect normal/bright   LABS:       Result See Note 4/11/18  Comment:  KELSEY BRISCOE  MRN:    [id number]  Myeloperoxidase Antibody 0   Comment:  INTERPRETIVE INFORMATION: Myeloperoxidase Abs, IgG    19 AU/mL or Less ......... Negative    20-25 AU/mL .............. Equivocal    26 AU/mL or Greater ...... Positive       Serine Protease 3 Antibody  Test                          Result   Units   Ref. Interval  Serine Protease3, IgG         4        AU/mL   0-19  INTERPRETIVE INFORMATION: Serine Protease 3, IgG  19 AU/mL or Less ........ Negative  20-25 AU/mL ............. Equivocal  26 AU/mL or Greater ..... Positive     Recent Results (from the past 168 hour(s))   CRP inflammation    Collection Time: 04/18/19  5:38 PM   Result Value Ref Range    CRP Inflammation 19.9 (H) 0.0 - 8.0 mg/L   Renal panel    Collection Time: 04/18/19  5:38 PM   Result Value Ref Range "    Sodium 139 133 - 144 mmol/L    Potassium 4.3 3.4 - 5.3 mmol/L    Chloride 108 94 - 109 mmol/L    Carbon Dioxide 27 20 - 32 mmol/L    Anion Gap 5 3 - 14 mmol/L    Glucose 90 70 - 99 mg/dL    Urea Nitrogen 21 7 - 30 mg/dL    Creatinine 0.94 0.52 - 1.04 mg/dL    GFR Estimate 62 >60 mL/min/[1.73_m2]    GFR Estimate If Black 72 >60 mL/min/[1.73_m2]    Calcium 8.8 8.5 - 10.1 mg/dL    Phosphorus 3.0 2.5 - 4.5 mg/dL    Albumin 3.3 (L) 3.4 - 5.0 g/dL   CBC with platelets differential    Collection Time: 04/18/19  5:38 PM   Result Value Ref Range    WBC 10.6 4.0 - 11.0 10e9/L    RBC Count 4.57 3.8 - 5.2 10e12/L    Hemoglobin 13.2 11.7 - 15.7 g/dL    Hematocrit 42.2 35.0 - 47.0 %    MCV 92 78 - 100 fl    MCH 28.9 26.5 - 33.0 pg    MCHC 31.3 (L) 31.5 - 36.5 g/dL    RDW 13.4 10.0 - 15.0 %    Platelet Count 329 150 - 450 10e9/L    Diff Method Automated Method     % Neutrophils 70.1 %    % Lymphocytes 17.7 %    % Monocytes 7.0 %    % Eosinophils 4.0 %    % Basophils 0.8 %    % Immature Granulocytes 0.4 %    Nucleated RBCs 0 0 /100    Absolute Neutrophil 7.4 1.6 - 8.3 10e9/L    Absolute Lymphocytes 1.9 0.8 - 5.3 10e9/L    Absolute Monocytes 0.7 0.0 - 1.3 10e9/L    Absolute Eosinophils 0.4 0.0 - 0.7 10e9/L    Absolute Basophils 0.1 0.0 - 0.2 10e9/L    Abs Immature Granulocytes 0.0 0 - 0.4 10e9/L    Absolute Nucleated RBC 0.0    Routine UA with micro reflex to culture    Collection Time: 04/18/19  5:45 PM   Result Value Ref Range    Color Urine Yellow     Appearance Urine Clear     Glucose Urine Negative NEG^Negative mg/dL    Bilirubin Urine Negative NEG^Negative    Ketones Urine Negative NEG^Negative mg/dL    Specific Gravity Urine 1.020 1.003 - 1.035    Blood Urine Negative NEG^Negative    pH Urine 6.0 5.0 - 7.0 pH    Protein Albumin Urine Negative NEG^Negative mg/dL    Urobilinogen mg/dL 0.0 0.0 - 2.0 mg/dL    Nitrite Urine Negative NEG^Negative    Leukocyte Esterase Urine Large (A) NEG^Negative    Source Midstream Urine      WBC Urine 29 (H) 0 - 5 /HPF    RBC Urine 3 (H) 0 - 2 /HPF    Squamous Epithelial /HPF Urine 1 0 - 1 /HPF    Mucous Urine Present (A) NEG^Negative /LPF   Urine Culture Aerobic Bacterial    Collection Time: 04/18/19  5:45 PM   Result Value Ref Range    Specimen Description Midstream Urine     Special Requests Specimen received in preservative     Culture Micro PENDING    Urine Culture Aerobic Bacterial    Collection Time: 04/18/19  5:45 PM   Result Value Ref Range    Specimen Description Midstream Urine     Special Requests Specimen received in preservative     Culture Micro PENDING      Albumin   Date Value Ref Range Status   04/18/2019 3.3 (L) 3.4 - 5.0 g/dL Final   03/02/2019 3.2 (L) 3.4 - 5.0 g/dL Final   01/19/2019 3.3 (L) 3.4 - 5.0 g/dL Final         URINE STUDIES  Recent Labs   Lab Test 04/18/19  1745 03/02/19  1106 11/17/18  0756   COLOR Yellow Yellow Yellow   APPEARANCE Clear Slightly Cloudy Clear   URINEGLC Negative Negative Negative   URINEBILI Negative Negative Negative   URINEKETONE Negative Negative Negative   SG 1.020 1.020 1.018   UBLD Negative Small* Small*   URINEPH 6.0 5.0 5.0   PROTEIN Negative Negative Negative   NITRITE Negative Negative Negative   LEUKEST Large* Large* Small*   RBCU 3* 5* 1   WBCU 29* 15* 2         CT Chest without IV contrast 6/2/2015  Result Impression     1. 8 mm nodule, with adjacent smaller nodules, in the left lower lobe has been stable since outside chest CT 04/21/2014. Recommend follow-up imaging in 6-12 months.   2. Stable bilateral patchy ground glass opacities, likely due to shallow inspiration.   3. Mild right hilar adenopathy, which is likely reactive.     FINDINGS:  Stable 8 mm nodule in the superior segment of the left lower lobe; this nodule has been stable since outside chest CT 04/21/2014. The two surrounding smaller nodules are relatively stable in appearance since 12/01/2014 (these nodules were obscured on outside chest CT). No new pulmonary nodules. Stable  scattered groundglass opacities in both lungs which may be due to shallow inspiration. Tiny calcified granuloma right lung apex. Trace amount of atelectasis in the lingula. Scattered mediastinal and left hilar lymph nodes are more prominent on today's exam, however remain within normal limits for size. New mild right hilar adenopathy, with largest node measuring approximately 1 cm in maximal diameter.     Electronically signed by:    MAGALIS Vargas [id number] 02-Jun-2015 10:46        Mario Freeman MD [id number] 02-Jun-2015 10:46       CT lung 2018: Stable lung nodules and 1 mm non-obstructing stone at upper pole of left kidney    ASSESSMENT AND PLAN:  # GPA (dx 2013)  Presented with pulmonary and renal involvement. S/P Rituximab q6 month since 0230-2413 without flare of the disease. Her last serology on April 2018 were reassuring. As she has been through a quiescent course, it may be reasonable to space out and/or reduce the dosage of rituximab. Last dose of rituximab was Sept 2018 .   She remains in complete remission on therapy, with no signs of active disease.  She presents today with non-specific complaints - many related to chronic arthralgias; and it is very possible that her feeling under the weather may be related to a UTI.  She also acknowledged spontaneously that part of her anxiety about getting rituximab stems from the fact that Radha is the anniversary of her first hospitalization with severe vasculitis.  I discussed with her that, since she has not had a relapse in 6 years, it was perhaps time to considering spacing out or even stopping immunosuppression.  This thought is clearly causing some anxiety to her.  Nevertheless, we agreed that we would only proceed with a dose of rituximab if the ANCA/B cells are back. We also discussed getting labs more frequently (e.g. Q 6 weeks to alleviate her anxiety )  I will  Treat for presumptive  UTI starting with augmentin 500 BID x 7 days.  I will await the result of the  the ANCA and peripheral B cells to decide on the timing of the next dose of rituximab.    Mario Fernandes MD  Division of Renal Disease and Hypertension  Pager: [phone]  April 18, 2019

## 2019-04-19 ENCOUNTER — TELEPHONE (OUTPATIENT)
Dept: NEPHROLOGY | Facility: CLINIC | Age: 69
End: 2019-04-19

## 2019-04-19 LAB
BACTERIA SPEC CULT: NO GROWTH
BACTERIA SPEC CULT: NORMAL
CD19 CELLS # BLD: 51 CELLS/UL (ref 107–698)
CD19 CELLS NFR BLD: 3 % (ref 6–27)
Lab: NORMAL
Lab: NORMAL
MYELOPEROXIDASE AB SER-ACNC: <0.2 AI (ref 0–0.9)
PROTEINASE3 IGG SER-ACNC: <0.2 AI (ref 0–0.9)
SPECIMEN SOURCE: NORMAL
SPECIMEN SOURCE: NORMAL

## 2019-04-19 NOTE — TELEPHONE ENCOUNTER
Per Dr. Fernandes, call to patient to inform her of Augmentin prescription sent per Dr. Fernandes for UTI. Patient already aware and picked up and she will call the clinic with any questions or concerns. She is aware that Vasculitis panel I still in process.  Yaa Malone LPN  Nephrology  [phone]

## 2019-04-22 ENCOUNTER — TELEPHONE (OUTPATIENT)
Dept: NEPHROLOGY | Facility: CLINIC | Age: 69
End: 2019-04-22

## 2019-04-22 NOTE — TELEPHONE ENCOUNTER
I called Ms Veronica this morning and discussed the followin- Urine culture came back as < 10K MUFlora.  -> no need to continue antibiotics  2- Peripheral CD19 B cells have recovered, and ANCA is still negative.  We discussed again the option of maintenance dose of rituximab, vs continuing with expectant observation.  Ms Veronica wishes to postpone such decision till after her upcoming evaluation by ENT and surgery (the latter for rectocele).  I concur with this approach. I will postpone her upcoming scheduled visit with me on May 4 to a later date.  Mario Fernandes MD  Division of Renal Disease and Hypertension  Pager: [phone]  2019  11:09 AM

## 2019-04-26 NOTE — TELEPHONE ENCOUNTER
FUTURE VISIT INFORMATION      FUTURE VISIT INFORMATION:    Date: 5/8/19    Time: 1:30PM    [place] [facility] [place]:    Referring provider:  Dr Mario Fernandes    Referring providers clinic:  [facility]     Reason for visit/diagnosis  referred for vasculitis w/sinus involvement    RECORDS REQUESTED FROM:       Clinic name Comments Records Status Imaging Status   [facility]  4/18/19, 3/2/19 notes with Dr Fernandes [facility] Diagnostic   09/27/2018 CT CHEST wo CONTRAST   03/27/2018 CT NECK NASOPHARYNX WITH IV CONTRAST  Report: care Everywhere req 4/30 - in PACS 5/1   [facility]   10/26/2016 PFT Care Everywhere    [facility] 9/3/14 CT Sinus Report: care everywhere req 4/26 - in PACS   [facility]  Imaging 4/25/14 CT Head Brain  Report: care everywhere req 4/30 - in PACS   [facility]   9/3/14 notes with Dash Santos Care Everywhere                        [facility]us will be in [facility] shortly - Amay  4/30/19 10AM left a message with [facility] film room to get images pushed on patient - Amay   4/30/191 10:07AM called [facility] for images - Amay

## 2019-05-02 ENCOUNTER — DOCUMENTATION ONLY (OUTPATIENT)
Dept: CARE COORDINATION | Facility: CLINIC | Age: 69
End: 2019-05-02

## 2019-05-07 ENCOUNTER — TELEPHONE (OUTPATIENT)
Dept: SURGERY | Facility: CLINIC | Age: 69
End: 2019-05-07

## 2019-05-07 NOTE — TELEPHONE ENCOUNTER
Left message for patient reminding her of her appt with Melissa Patel NP on 5/8/19 @ 3pm.. Left direct number for pt to call back if unable to make appt.    Bernardo Bray LPN

## 2019-05-08 ENCOUNTER — PRE VISIT (OUTPATIENT)
Dept: SURGERY | Facility: CLINIC | Age: 69
End: 2019-05-08

## 2019-05-08 ENCOUNTER — OFFICE VISIT (OUTPATIENT)
Dept: SURGERY | Facility: CLINIC | Age: 69
End: 2019-05-08
Payer: MEDICARE

## 2019-05-08 ENCOUNTER — PRE VISIT (OUTPATIENT)
Dept: OTOLARYNGOLOGY | Facility: CLINIC | Age: 69
End: 2019-05-08

## 2019-05-08 ENCOUNTER — OFFICE VISIT (OUTPATIENT)
Dept: OTOLARYNGOLOGY | Facility: CLINIC | Age: 69
End: 2019-05-08
Payer: MEDICARE

## 2019-05-08 VITALS
HEART RATE: 92 BPM | WEIGHT: 229 LBS | BODY MASS INDEX: 40.57 KG/M2 | HEIGHT: 63 IN | SYSTOLIC BLOOD PRESSURE: 143 MMHG | DIASTOLIC BLOOD PRESSURE: 84 MMHG

## 2019-05-08 VITALS
RESPIRATION RATE: 17 BRPM | BODY MASS INDEX: 40.57 KG/M2 | WEIGHT: 229 LBS | HEART RATE: 92 BPM | DIASTOLIC BLOOD PRESSURE: 84 MMHG | HEIGHT: 63 IN | SYSTOLIC BLOOD PRESSURE: 143 MMHG

## 2019-05-08 DIAGNOSIS — K60.2 ANAL FISSURE: ICD-10-CM

## 2019-05-08 DIAGNOSIS — N81.6 RECTOCELE: Primary | ICD-10-CM

## 2019-05-08 DIAGNOSIS — J34.89 NASAL VESTIBULITIS: Primary | ICD-10-CM

## 2019-05-08 RX ORDER — LEVOTHYROXINE SODIUM 137 UG/1
TABLET ORAL DAILY
Refills: 3 | COMMUNITY
Start: 2019-04-22

## 2019-05-08 RX ORDER — MUPIROCIN 20 MG/G
OINTMENT TOPICAL
Qty: 22 G | Refills: 0 | Status: SHIPPED | OUTPATIENT
Start: 2019-05-08

## 2019-05-08 ASSESSMENT — ENCOUNTER SYMPTOMS
SNORES LOUDLY: 0
DIARRHEA: 0
JOINT SWELLING: 0
ARTHRALGIAS: 1
COUGH DISTURBING SLEEP: 1
BLOATING: 1
SINUS CONGESTION: 1
HEARTBURN: 1
MUSCLE CRAMPS: 0
VOMITING: 0
CONSTIPATION: 1
NECK PAIN: 0
SPUTUM PRODUCTION: 1
RECTAL PAIN: 0
DYSPNEA ON EXERTION: 0
SHORTNESS OF BREATH: 0
WHEEZING: 0
BLOOD IN STOOL: 0
COUGH: 1
ABDOMINAL PAIN: 0
JAUNDICE: 0
POSTURAL DYSPNEA: 0
BACK PAIN: 1
MYALGIAS: 0
STIFFNESS: 1
MUSCLE WEAKNESS: 0
NAUSEA: 0
HOARSE VOICE: 1
BOWEL INCONTINENCE: 0

## 2019-05-08 ASSESSMENT — MIFFLIN-ST. JEOR
SCORE: 1537.87
SCORE: 1537.74

## 2019-05-08 ASSESSMENT — PAIN SCALES - GENERAL: PAINLEVEL: NO PAIN (0)

## 2019-05-08 NOTE — LETTER
"2019       RE: Waleska Veronica  [address]  [facility] [address]     Dear Colleague,    Thank you for referring your patient, Waleska Veronica, to the [facility] COLON AND RECTAL SURGERY at [facility]. Please see a copy of my visit note below.    Colon and Rectal Surgery Consult Clinic Note    Date: 2019     Referring provider:  Mario Fernandes MD  [address] [place] [id number]     RE: Waleska Veronica  : 1950  JAYSON: 2019    Waleska Veronica is a very pleasant 68 year old female with Wegener's granulomatosis with renal involvement and DVT with a recent diagnosis of possible rectocele or rectal prolapse.  Given these findings they were subsequently sent to the Colon and Rectal Surgery Clinic for an opinion on this and a new patient consultation.     Waleska reports being diagnosed with a rectocele years ago.  This is gotten progressively larger and more symptomatic.  She can feel a bulge in her vagina.  She gets stool stuck in the rectocele.  She does not do any splinting but does have to strain a lot to pass even soft stools.  She denies any tissue that prolapses with bowel movements.  She does notice a small amount of bright red blood with bowel movements occasionally and thinks that she has caused some hemorrhoids due to the straining.  She denies any fecal incontinence.  She has had 4 vaginal birth with her largest baby being 7 pounds and she did have some tearing.  She denies any family history of any colon cancer and has never had a colonoscopy.    Physical Examination:  /84   Pulse 92   Ht 5' 3\"   Wt 229 lb   BMI 40.57 kg/m     General: alert, oriented, in no acute distress, sitting comfortably  HEENT: mucous membranes moist    Perianal external examination: Exam was chaperoned by SOFYA Camejo   Perianal skin: Intact with no excoriation or lichenification.  Lesions: No evidence of an external lesion, nodularity, or induration in the " perianal region.  Eversion of buttocks: There was evidence of an anal fissure. Details: small anterior midline anal fissure.  Skin tags or external hemorrhoids: Yes: small skin tag in the anterior midline.  Digital rectal examination: Was performed.   Sphincter tone: Good.  Palpable lesions: No.  Other: A medium rectocele was appreciated on bearing down.  Bimanual examination: was not performed    Anoscopy: Was deferred    Assessment/Plan: 68 year old female with anal fissure and rectocele.  Symptomatic rectocele.  She does have some looser and more hard bowel movements.  We will try bulking up her bowel movements with a fiber supplement to see if this improves symptoms.  However, if she continues to feel that stool gets stuck in her rectum, she can meet with urogynecology to discuss possible surgical intervention for her rectocele.  She does have a small anal fissure in the anterior midline, which could be a result of hard stools and straining.  Will treat with topical diltiazem and have her return to clinic in 8 weeks if she has any persistent bleeding.  I strongly recommended a colonoscopy as well.  She would like to do this closer to home. Patient's questions were answered to her stated satisfaction and she is in agreement with this plan.    Medical history:  Past Medical History:   Diagnosis Date     Autoimmune disease (H)     wegeners     DVT (deep venous thrombosis) (H) 1990    occured 1 month after starting OCP     GERD (gastroesophageal reflux disease)      Hearing problem      Hyperuricemia      Hypothyroid      Immunosuppression (H)      Kidney problem      Obesity (BMI 35.0-39.9 without comorbidity)      Venous insufficiency (chronic) (peripheral)      Wegener's granulomatosis with renal involvement (H)        Surgical history:  Past Surgical History:   Procedure Laterality Date     HYSTERECTOMY  1997     TUBAL LIGATION         Problem list:  There are no active problems to display for this  "patient.      Medications:  Current Outpatient Medications   Medication Sig Dispense Refill     diltiazem 2% in PLO cream, FV COMPOUNDED, 2% GEL Apply small amount to anal opening three times daily for 8 weeks. 60 g 0     levothyroxine (SYNTHROID/LEVOTHROID) 137 MCG tablet   3     mupirocin (BACTROBAN) 2 % external ointment Use in nose twice daily for 5 days 22 g 0       Allergies:  No Known Allergies    Family history:  No family history on file.    Social history:  Social History     Tobacco Use     Smoking status: Never Smoker     Smokeless tobacco: Never Used   Substance Use Topics     Alcohol use: No    Marital status: .  Occupation: retired.    Nursing Notes:   Edgar Oro EMT  5/8/2019  2:50 PM  Signed  Chief Complaint   Patient presents with     Rectal Problem     Possible rectocele and rectal prolapse.       Vitals:    05/08/19 1449   BP: 143/84   Pulse: 92   Weight: 229 lb   Height: 5' 3\"       Body mass index is 40.57 kg/m .      SOFYA Camejo        Total face to face time was 30 minutes, >50% counseling.    BETH Anderson, NP-C  Colon and Rectal Surgery   [facility]    This note was created using speech recognition software and may contain unintended word substitutions.      "

## 2019-05-08 NOTE — NURSING NOTE
"Chief Complaint   Patient presents with     Rectal Problem     Possible rectocele and rectal prolapse.       Vitals:    05/08/19 1449   BP: 143/84   Pulse: 92   Weight: 229 lb   Height: 5' 3\"       Body mass index is 40.57 kg/m .      Edgar Oro, EMT                      "

## 2019-05-08 NOTE — PATIENT INSTRUCTIONS
Thank you for choosing  [facility].  Please follow up with Dr. Leger as needed.    Dr. Leger recommended:  1) mupirocin (BACTROBAN) 2 % external ointment 22 g 0 5/8/2019  --   Sig: Use in nose twice daily for 5 days   Sent to pharmacy as: mupirocin (BACTROBAN) 2 % external ointment         [phone] appointment scheduling option 1 and nurse advice option 3.

## 2019-05-08 NOTE — PROGRESS NOTES
"Colon and Rectal Surgery Consult Clinic Note    Date: 2019     Referring provider:  Mario Fernandes MD  [address] [place] [address]     RE: Waleska Veronica  : 1950  JAYSON: 2019    Waleska Veronica is a very pleasant 68 year old female with Wegener's granulomatosis with renal involvement and DVT with a recent diagnosis of possible rectocele or rectal prolapse.  Given these findings they were subsequently sent to the [facility] and [facility]al Surgery Clinic for an opinion on this and a new patient consultation.     Waleska reports being diagnosed with a rectocele years ago.  This is gotten progressively larger and more symptomatic.  She can feel a bulge in her vagina.  She gets stool stuck in the rectocele.  She does not do any splinting but does have to strain a lot to pass even soft stools.  She denies any tissue that prolapses with bowel movements.  She does notice a small amount of bright red blood with bowel movements occasionally and thinks that she has caused some hemorrhoids due to the straining.  She denies any fecal incontinence.  She has had 4 vaginal birth with her largest baby being 7 pounds and she did have some tearing.  She denies any family history of any colon cancer and has never had a colonoscopy.    Physical Examination:  /84   Pulse 92   Ht 5' 3\"   Wt 229 lb   BMI 40.57 kg/m    General: alert, oriented, in no acute distress, sitting comfortably  HEENT: mucous membranes moist    Perianal external examination: Exam was chaperoned by SOFYA Camejo   Perianal skin: Intact with no excoriation or lichenification.  Lesions: No evidence of an external lesion, nodularity, or induration in the perianal region.  Eversion of buttocks: There was evidence of an anal fissure. Details: small anterior midline anal fissure.  Skin tags or external hemorrhoids: Yes: small skin tag in the anterior midline.  Digital rectal examination: Was performed.   Sphincter tone: Good.  Palpable " lesions: No.  Other: A medium rectocele was appreciated on bearing down.  Bimanual examination: was not performed    Anoscopy: Was deferred    Assessment/Plan: 68 year old female with anal fissure and rectocele.  Symptomatic rectocele.  She does have some looser and more hard bowel movements.  We will try bulking up her bowel movements with a fiber supplement to see if this improves symptoms.  However, if she continues to feel that stool gets stuck in her rectum, she can meet with urogynecology to discuss possible surgical intervention for her rectocele.  She does have a small anal fissure in the anterior midline, which could be a result of hard stools and straining.  Will treat with topical diltiazem and have her return to clinic in 8 weeks if she has any persistent bleeding.  I strongly recommended a colonoscopy as well.  She would like to do this closer to home. Patient's questions were answered to her stated satisfaction and she is in agreement with this plan.    Medical history:  Past Medical History:   Diagnosis Date     Autoimmune disease (H)     wegeners     DVT (deep venous thrombosis) (H) 1990    occured 1 month after starting OCP     GERD (gastroesophageal reflux disease)      Hearing problem      Hyperuricemia      Hypothyroid      Immunosuppression (H)      Kidney problem      Obesity (BMI 35.0-39.9 without comorbidity)      Venous insufficiency (chronic) (peripheral)      Wegener's granulomatosis with renal involvement (H)        Surgical history:  Past Surgical History:   Procedure Laterality Date     HYSTERECTOMY  1997     TUBAL LIGATION         Problem list:  There are no active problems to display for this patient.      Medications:  Current Outpatient Medications   Medication Sig Dispense Refill     diltiazem 2% in PLO cream, FV COMPOUNDED, 2% GEL Apply small amount to anal opening three times daily for 8 weeks. 60 g 0     levothyroxine (SYNTHROID/LEVOTHROID) 137 MCG tablet   3     mupirocin  "(BACTROBAN) 2 % external ointment Use in nose twice daily for 5 days 22 g 0       Allergies:  No Known Allergies    Family history:  No family history on file.    Social history:  Social History     Tobacco Use     Smoking status: Never Smoker     Smokeless tobacco: Never Used   Substance Use Topics     Alcohol use: No    Marital status: .  Occupation: retired.    Nursing Notes:   Edgar Oro EMT  5/8/2019  2:50 PM  Signed  Chief Complaint   Patient presents with     Rectal Problem     Possible rectocele and rectal prolapse.       Vitals:    05/08/19 1449   BP: 143/84   Pulse: 92   Weight: 229 lb   Height: 5' 3\"       Body mass index is 40.57 kg/m .      SOFYA Camejo                         Total face to face time was 30 minutes, >50% counseling.    BETH Anderson, NP-C  Colon and Rectal Surgery   [facility]    This note was created using speech recognition software and may contain unintended word substitutions.    "

## 2019-05-08 NOTE — LETTER
2019       RE: Waleska Veronica  [address]  [facility] [address]     Dear Colleague,    Thank you for referring your patient, Waleska Veronica, to the [facility] at [facility]. Please see a copy of my visit note below.    Otolaryngology Adult Consultation    Patient: Waleska Veronica   : 1950     Patient presents with:  Consult:   Chief Complaint   Patient presents with     Consult     vasculitis  and sinusitis         Referring Provider: Referred Self   Consulting Physician:  Kimberly Leger MD       Assessment/Plan: Patient with vasculitis with minimal nasal involvement. Prescribed mupirocin ointment for mild crusting. She will see me again if she has concerns regarding symptoms.      HPI: Waleska Veronica is a 68 year old female seen today in the Otolaryngology Clinic for a history of vasculitis.  She is here for evaluation of nasal involvement. Symptoms include post nasal drainage, which is chronic. No active or recent nasal bleeding. Duration of symptoms has been many years. Her remote acute episode was associated with lung, nasal and renal involvement. She has been on rituximab for many years, but has now been off for 8 months. She is carefully monitoring symptoms to be on top of any recurrence now that meds have changed.     Current Outpatient Medications on File Prior to Visit:  levothyroxine (SYNTHROID/LEVOTHROID) 137 MCG tablet      No current facility-administered medications on file prior to visit.      No Known Allergies    Past Medical History:   Diagnosis Date     Autoimmune disease (H)     wegeners     DVT (deep venous thrombosis) (H)     occured 1 month after starting OCP     GERD (gastroesophageal reflux disease)      Hearing problem      Hyperuricemia      Hypothyroid      Immunosuppression (H)      Kidney problem      Obesity (BMI 35.0-39.9 without comorbidity)      Venous insufficiency (chronic) (peripheral)      Wegener's  granulomatosis with renal involvement (H)        Social History     Occupational History     Not on file   Tobacco Use     Smoking status: Never Smoker     Smokeless tobacco: Never Used   Substance and Sexual Activity     Alcohol use: No     Drug use: No     Sexual activity: Not Currently     Partners: Male     Birth control/protection: None        Review of Systems  [facility] ROS 5/8/2019   Cardiopulmonary Cough   Gastrointestinal/Genitourinary Constipation   Musculoskeletal Sore or stiff joints, Back pain        14 point ROS neg other than the symptoms noted above.    Physical Exam:    General Assessment   The patient is alert, oriented and in no acute distress.   Head/Face/Scalp  Grossly normal.   Ears  Normal canals, auricles and tympanic membranes.   Nose  External nose is straight, skin is normal. Intranasal exam (anterior rhinoscopy) reveals normal moist mucosa, turbinate tissue without edema, erythema or crusting.  Septum mainly in the midline.  Mild crusting in nasal vestibule  Mouth  Oral cavity shows healthy mucosa with out ulceration, masses or other lesions involving the tongue, palate, buccal mucosa, floor of mouth or gingiva.  Dentition in good repair.  Oropharynx with normal tonsils, posterior wall and base of tongue.  Neck  No significant adenopathy, thyroid or salivary gland abnormality.       Again, thank you for allowing me to participate in the care of your patient.      Sincerely,    Kimberly Leger MD

## 2019-05-08 NOTE — PATIENT INSTRUCTIONS
1. Diltiazem ointment 2% to be applied 3 times daily for 8 weeks  2. Fiber supplement such as Metamcuil, Citrucel, or Benefiber 1-3 times a day. Start with once a day and slowly increase up to three times a day over the next 4 weeks.  3. Drink 10 glasses of water a day  4. Follow up with urogynecology if continued symptoms with rectocele  5. I STRONGLY recommend a colonoscopy

## 2019-05-08 NOTE — PROGRESS NOTES
Otolaryngology Adult Consultation    Patient: Waleska Veronica   : 1950     Patient presents with:  Consult:   Chief Complaint   Patient presents with     Consult     vasculitis  and sinusitis         Referring Provider: Referred Self   Consulting Physician:  Kimberly Leger MD       Assessment/Plan: Patient with vasculitis with minimal nasal involvement. Prescribed mupirocin ointment for mild crusting. She will see me again if she has concerns regarding symptoms.      HPI: Waleska Veronica is a 68 year old female seen today in the Otolaryngology Clinic for a history of vasculitis.  She is here for evaluation of nasal involvement. Symptoms include post nasal drainage, which is chronic. No active or recent nasal bleeding. Duration of symptoms has been many years. Her remote acute episode was associated with lung, nasal and renal involvement. She has been on rituximab for many years, but has now been off for 8 months. She is carefully monitoring symptoms to be on top of any recurrence now that meds have changed.     Current Outpatient Medications on File Prior to Visit:  levothyroxine (SYNTHROID/LEVOTHROID) 137 MCG tablet      No current facility-administered medications on file prior to visit.      No Known Allergies    Past Medical History:   Diagnosis Date     Autoimmune disease (H)     wegeners     DVT (deep venous thrombosis) (H)     occured 1 month after starting OCP     GERD (gastroesophageal reflux disease)      Hearing problem      Hyperuricemia      Hypothyroid      Immunosuppression (H)      Kidney problem      Obesity (BMI 35.0-39.9 without comorbidity)      Venous insufficiency (chronic) (peripheral)      Wegener's granulomatosis with renal involvement (H)        Social History     Occupational History     Not on file   Tobacco Use     Smoking status: Never Smoker     Smokeless tobacco: Never Used   Substance and Sexual Activity     Alcohol use: No     Drug use: No     Sexual activity: Not Currently      Partners: Male     Birth control/protection: None        Review of Systems  [facility] ROS 5/8/2019   Cardiopulmonary Cough   Gastrointestinal/Genitourinary Constipation   Musculoskeletal Sore or stiff joints, Back pain        14 point ROS neg other than the symptoms noted above.    Physical Exam:    General Assessment   The patient is alert, oriented and in no acute distress.   Head/Face/Scalp  Grossly normal.   Ears  Normal canals, auricles and tympanic membranes.   Nose  External nose is straight, skin is normal. Intranasal exam (anterior rhinoscopy) reveals normal moist mucosa, turbinate tissue without edema, erythema or crusting.  Septum mainly in the midline.  Mild crusting in nasal vestibule  Mouth  Oral cavity shows healthy mucosa with out ulceration, masses or other lesions involving the tongue, palate, buccal mucosa, floor of mouth or gingiva.  Dentition in good repair.  Oropharynx with normal tonsils, posterior wall and base of tongue.  Neck  No significant adenopathy, thyroid or salivary gland abnormality.

## 2019-05-08 NOTE — NURSING NOTE
"Chief Complaint   Patient presents with     Consult     vasculitis  and sinusitis      Blood pressure 143/84, pulse 92, resp. rate 17, height 1.6 m (5' 2.99\"), weight 103.9 kg (229 lb).    Jaydon Jones LPN    "

## 2019-06-01 ENCOUNTER — OFFICE VISIT (OUTPATIENT)
Dept: NEPHROLOGY | Facility: CLINIC | Age: 69
End: 2019-06-01
Attending: INTERNAL MEDICINE
Payer: MEDICARE

## 2019-06-01 VITALS
HEART RATE: 70 BPM | DIASTOLIC BLOOD PRESSURE: 79 MMHG | WEIGHT: 228.8 LBS | RESPIRATION RATE: 18 BRPM | SYSTOLIC BLOOD PRESSURE: 117 MMHG | TEMPERATURE: 97.7 F | BODY MASS INDEX: 40.53 KG/M2

## 2019-06-01 DIAGNOSIS — I77.6 VASCULITIS (H): Primary | ICD-10-CM

## 2019-06-01 DIAGNOSIS — I77.6 VASCULITIS (H): ICD-10-CM

## 2019-06-01 LAB
ALBUMIN SERPL-MCNC: 3.2 G/DL (ref 3.4–5)
ALBUMIN UR-MCNC: NEGATIVE MG/DL
ANION GAP SERPL CALCULATED.3IONS-SCNC: 4 MMOL/L (ref 3–14)
APPEARANCE UR: CLEAR
BILIRUB UR QL STRIP: NEGATIVE
BUN SERPL-MCNC: 17 MG/DL (ref 7–30)
CALCIUM SERPL-MCNC: 8.6 MG/DL (ref 8.5–10.1)
CD19 CELLS # BLD: 133 CELLS/UL (ref 107–698)
CD19 CELLS NFR BLD: 8 % (ref 6–27)
CHLORIDE SERPL-SCNC: 108 MMOL/L (ref 94–109)
CO2 SERPL-SCNC: 28 MMOL/L (ref 20–32)
COLOR UR AUTO: YELLOW
CREAT SERPL-MCNC: 0.88 MG/DL (ref 0.52–1.04)
GFR SERPL CREATININE-BSD FRML MDRD: 67 ML/MIN/{1.73_M2}
GLUCOSE SERPL-MCNC: 158 MG/DL (ref 70–99)
GLUCOSE UR STRIP-MCNC: NEGATIVE MG/DL
HGB UR QL STRIP: ABNORMAL
KETONES UR STRIP-MCNC: NEGATIVE MG/DL
LEUKOCYTE ESTERASE UR QL STRIP: ABNORMAL
MUCOUS THREADS #/AREA URNS LPF: PRESENT /LPF
NITRATE UR QL: NEGATIVE
PH UR STRIP: 5 PH (ref 5–7)
PHOSPHATE SERPL-MCNC: 3.6 MG/DL (ref 2.5–4.5)
POTASSIUM SERPL-SCNC: 4.1 MMOL/L (ref 3.4–5.3)
RBC #/AREA URNS AUTO: 3 /HPF (ref 0–2)
SODIUM SERPL-SCNC: 141 MMOL/L (ref 133–144)
SOURCE: ABNORMAL
SP GR UR STRIP: 1.02 (ref 1–1.03)
SQUAMOUS #/AREA URNS AUTO: 3 /HPF (ref 0–1)
UROBILINOGEN UR STRIP-MCNC: 0 MG/DL (ref 0–2)
WBC #/AREA URNS AUTO: 10 /HPF (ref 0–5)

## 2019-06-01 PROCEDURE — 83876 ASSAY MYELOPEROXIDASE: CPT | Performed by: INTERNAL MEDICINE

## 2019-06-01 PROCEDURE — 80069 RENAL FUNCTION PANEL: CPT | Performed by: INTERNAL MEDICINE

## 2019-06-01 PROCEDURE — 81001 URINALYSIS AUTO W/SCOPE: CPT | Performed by: INTERNAL MEDICINE

## 2019-06-01 PROCEDURE — 83516 IMMUNOASSAY NONANTIBODY: CPT | Performed by: INTERNAL MEDICINE

## 2019-06-01 PROCEDURE — G0463 HOSPITAL OUTPT CLINIC VISIT: HCPCS | Mod: ZF

## 2019-06-01 PROCEDURE — 36415 COLL VENOUS BLD VENIPUNCTURE: CPT | Performed by: INTERNAL MEDICINE

## 2019-06-01 PROCEDURE — 86355 B CELLS TOTAL COUNT: CPT | Performed by: INTERNAL MEDICINE

## 2019-06-01 ASSESSMENT — PAIN SCALES - GENERAL: PAINLEVEL: NO PAIN (0)

## 2019-06-01 NOTE — PROGRESS NOTES
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 She was hospitalized x 10 days in [place]. Initially, hemoptysis attributed to pneumonia. She mentioned that her lung was \"messed up enough\" that they could not perform a (transbronchial) lung biopsy. She was put on prednisone and tapered off eventually. While she was at [place], the diagnosis of Wegener's was not confirmed.  March 2013:  Back in [place].  Admitted to [facility] with acute renal failure and scant hemoptysis.  She had significant fatigue, oral ulcers, scant purpura on the forearms ( \"months\" after finishing the course of prednisone).  She did not require dialysis.  Dx of \"Wegener's granulomatosis\"  -> plasmapheresis (x 10+ sessions?) under the care of Dr Lowry (nephrology) .  She received prednisone and was treated with Rituximab once per week for 4 weeks.  April 2013 - 2018  : She was on prednisone for around 4 months. She experienced a lot of side effect including weight gain, mood changes, cushingoid facies.  She denies have hyperglycemia.  She was tapered off prednisone She then received rituximab every 6 months (1000 mg)       Treatment summary  March 2013: Plasmapheresis + Rituximab  Rituximab 1000 mg IV every 6 month since initial treatment in 2013 SEPTEMBER 7, 2018: Last dose of Rituximab 1000 mg. (8months after previous dose)      Interval summary  Her main question at this time pertains to the need for " "continued maintenance therapy with rituximab.  She asks about her risk for relapse.  Since she has been getting rituximab since the onset of the disease, she is not sure about how to move forward. She endorses having joint pain all of the time from multiple kinds of arthritis. She denies nose bleeding/hair loss/mouth sore but would have periodic nose congestion. Overall, she did not have any official flare' of the disease since March 2013. She only has the aforementioned symptoms which she thinks it could be related to cold or other minor illness. Reported feeling good 8/10. The bothersome symptoms were plantar fasciitis s/p steroid injection and back pain. She has not been using CPAP because she was not satisfied with the encounter with sleep providers.    3/2/19  Had a good trip to [place].  Reports no acute illness since the last visit.  Has hot flashes (old)  Dry nose,  No epistaxis.  No oral ulcers.  Cough, with post nasal drip..  Clear but occasional yellow  No F/Chills  Energy:  \" I'm lazy\"  No CP.  No GI symptoms.  No  symptoms.    4/18/19  She feels poorly, in multiple   She is more tired and achy.  \"all joints are hurting\" plantar fasciitis,  Thumbs and knees are hurting (also chronic)  More night sweats over the last 2 weeks.  No F/chills.  NO oral ulcers.  Cough is a little worse from sinus drainage.  More back aches.  One blister on external surface of R arm near elbow, which is now dry and scaly.  She admits to some anxiety because this is the 5th anniversary of her hospitalization.    June 1, 2019  Since last visit she had an URI, bad cough around April 29th.  Flu test was neg.  She eventually got better without antibiotics.  Still has a little cough.  NO F or Chills.  Continues to have chronic diffuse arthralgias.  Was seen by Dr Leger, and was told she had mild crusting.    Continues to have night sweats every night...  Since 1997.  No oral ulcers or epistaxis.  NO  symptoms.      PAST MEDICAL " HISTORY:  Past Medical History:   Diagnosis Date     DVT (deep venous thrombosis) (H)     occured 1 month after starting OCP     GERD (gastroesophageal reflux disease)      Hyperuricemia      Hypothyroid      Obesity (BMI 35.0-39.9 without comorbidity)      Venous insufficiency (chronic) (peripheral)      Wegener's granulomatosis with renal involvement (H)     , No history of complicated pregnancies.  Last pregnancy with twins       PAST SURGICAL HISTORY:  Past Surgical History:   Procedure Laterality Date     HYSTERECTOMY       TUBAL LIGATION            MEDICATIONS:  Prescription Medications as of 2019         Rx Number Disp Refills Start End Last Dispensed Date Next Fill Date Owning Pharmacy    levothyroxine (SYNTHROID/LEVOTHROID) 112 MCG tablet            Sig: Take 112 mcg by mouth    Class: Historical    Route: Oral    LACTOBACILLUS PO            Si tablet    Class: Historical    valACYclovir (VALTREX) 1000 mg tablet    2018        Class: HistoricalNOT TAKING        ALLERGIES:    No Known Allergies     REVIEW OF SYSTEMS:  A comprehensive review of systems was performed and found to be negative except as described here or above.   SOCIAL HISTORY:   Social History     Socioeconomic History     Marital status:      Spouse name: Not on file     Number of children: Not on file     Years of education: Not on file     Highest education level: Not on file   Occupational History     Not on file   Social Needs     Financial resource strain: Not on file     Food insecurity:     Worry: Not on file     Inability: Not on file     Transportation needs:     Medical: Not on file     Non-medical: Not on file   Tobacco Use     Smoking status: Never Smoker     Smokeless tobacco: Never Used   Substance and Sexual Activity     Alcohol use: No     Drug use: No     Sexual activity: Not Currently     Partners: Male     Birth control/protection: None   Lifestyle     Physical activity:     Days per  week: Not on file     Minutes per session: Not on file     Stress: Not on file   Relationships     Social connections:     Talks on phone: Not on file     Gets together: Not on file     Attends [employer] service: Not on file     Active member of club or organization: Not on file     Attends meetings of clubs or organizations: Not on file     Relationship status: Not on file     Intimate partner violence:     Fear of current or ex partner: Not on file     Emotionally abused: Not on file     Physically abused: Not on file     Forced sexual activity: Not on file   Other Topics Concern     Not on file   Social History Narrative     Not on file     Lives in [place]  Family is in town in [place]     FAMILY MEDICAL HISTORY:   Has 4 children, Daughter 48, Daughter 47, fraternal twin : daughter and son age 46    PHYSICAL EXAM:   /79   Pulse 70   Temp 97.7  F (36.5  C) (Oral)   Resp 18   Wt 103.8 kg (228 lb 12.8 oz)   BMI 40.53 kg/m    GENERAL APPEARANCE: alert and no distress, joyful  EYES: nonicteric  HENT: mouth without ulcers or lesions, TM without erythema  NECK: supple, no adenopathy  RESP: lungs clear to auscultation   CV: regular rhythm, normal rate, no rub  ABDOMEN: soft, nontender, normal bowel sounds  Extremities: no clubbing, cyanosis, trace pitting edema left>right  MS: no evidence of inflammation in joints, no muscle tenderness  SKIN: no rash  NEURO: mentation intact and speech normal  PSYCH: affect normal/bright   LABS:       Result See Note 4/11/18  Comment:  BRISCOEKELSEY  MRN:    [id number]  Myeloperoxidase Antibody 0   Comment:  INTERPRETIVE INFORMATION: Myeloperoxidase Abs, IgG    19 AU/mL or Less ......... Negative    20-25 AU/mL .............. Equivocal    26 AU/mL or Greater ...... Positive       Serine Protease 3 Antibody  Test                          Result   Units   Ref. Interval  Serine Protease3, IgG         4        AU/mL   0-19  INTERPRETIVE INFORMATION: Serine Protease 3,  IgG  19 AU/mL or Less ........ Negative  20-25 AU/mL ............. Equivocal  26 AU/mL or Greater ..... Positive     Recent Results (from the past 168 hour(s))   Vasculitis panel    Collection Time: 06/01/19 10:56 AM   Result Value Ref Range    Myeloperoxidase Antibody IgG <0.2 0.0 - 0.9 AI    Proteinase 3 Antibody IgG 0.2 0.0 - 0.9 AI   Renal panel    Collection Time: 06/01/19 10:56 AM   Result Value Ref Range    Sodium 141 133 - 144 mmol/L    Potassium 4.1 3.4 - 5.3 mmol/L    Chloride 108 94 - 109 mmol/L    Carbon Dioxide 28 20 - 32 mmol/L    Anion Gap 4 3 - 14 mmol/L    Glucose 158 (H) 70 - 99 mg/dL    Urea Nitrogen 17 7 - 30 mg/dL    Creatinine 0.88 0.52 - 1.04 mg/dL    GFR Estimate 67 >60 mL/min/[1.73_m2]    GFR Estimate If Black 77 >60 mL/min/[1.73_m2]    Calcium 8.6 8.5 - 10.1 mg/dL    Phosphorus 3.6 2.5 - 4.5 mg/dL    Albumin 3.2 (L) 3.4 - 5.0 g/dL   CD19 B Cell Count    Collection Time: 06/01/19 10:56 AM   Result Value Ref Range    CD19 B Cells 8 6 - 27 %    Absolute CD19 133 107 - 698 cells/uL   UA reflex to Microscopic    Collection Time: 06/01/19 11:05 AM   Result Value Ref Range    Color Urine Yellow     Appearance Urine Clear     Glucose Urine Negative NEG^Negative mg/dL    Bilirubin Urine Negative NEG^Negative    Ketones Urine Negative NEG^Negative mg/dL    Specific Gravity Urine 1.019 1.003 - 1.035    Blood Urine Small (A) NEG^Negative    pH Urine 5.0 5.0 - 7.0 pH    Protein Albumin Urine Negative NEG^Negative mg/dL    Urobilinogen mg/dL 0.0 0.0 - 2.0 mg/dL    Nitrite Urine Negative NEG^Negative    Leukocyte Esterase Urine Moderate (A) NEG^Negative    Source Midstream Urine     RBC Urine 3 (H) 0 - 2 /HPF    WBC Urine 10 (H) 0 - 5 /HPF    Squamous Epithelial /HPF Urine 3 (H) 0 - 1 /HPF    Mucous Urine Present (A) NEG^Negative /LPF     Albumin   Date Value Ref Range Status   06/01/2019 3.2 (L) 3.4 - 5.0 g/dL Final   04/18/2019 3.3 (L) 3.4 - 5.0 g/dL Final   03/02/2019 3.2 (L) 3.4 - 5.0 g/dL Final    01/19/2019 3.3 (L) 3.4 - 5.0 g/dL Final         URINE STUDIES  Recent Labs   Lab Test 06/01/19  1105 04/18/19  1745 03/02/19  1106 11/17/18  0756   COLOR Yellow Yellow Yellow Yellow   APPEARANCE Clear Clear Slightly Cloudy Clear   URINEGLC Negative Negative Negative Negative   URINEBILI Negative Negative Negative Negative   URINEKETONE Negative Negative Negative Negative   SG 1.019 1.020 1.020 1.018   UBLD Small* Negative Small* Small*   URINEPH 5.0 6.0 5.0 5.0   PROTEIN Negative Negative Negative Negative   NITRITE Negative Negative Negative Negative   LEUKEST Moderate* Large* Large* Small*   RBCU 3* 3* 5* 1   WBCU 10* 29* 15* 2         CT Chest without IV contrast 6/2/2015  Result Impression     1. 8 mm nodule, with adjacent smaller nodules, in the left lower lobe has been stable since outside chest CT 04/21/2014. Recommend follow-up imaging in 6-12 months.   2. Stable bilateral patchy ground glass opacities, likely due to shallow inspiration.   3. Mild right hilar adenopathy, which is likely reactive.     FINDINGS:  Stable 8 mm nodule in the superior segment of the left lower lobe; this nodule has been stable since outside chest CT 04/21/2014. The two surrounding smaller nodules are relatively stable in appearance since 12/01/2014 (these nodules were obscured on outside chest CT). No new pulmonary nodules. Stable scattered groundglass opacities in both lungs which may be due to shallow inspiration. Tiny calcified granuloma right lung apex. Trace amount of atelectasis in the lingula. Scattered mediastinal and left hilar lymph nodes are more prominent on today's exam, however remain within normal limits for size. New mild right hilar adenopathy, with largest node measuring approximately 1 cm in maximal diameter.     Electronically signed by:    MAGALIS Vargas -[id number] 02-Jun-2015 10:46        Mario Freeman MD [phone] 02-Jun-2015 10:46       CT lung 2018: Stable lung nodules and 1 mm non-obstructing stone at upper pole  of left kidney    ASSESSMENT AND PLAN:  # GPA (dx 2013)  Presented with pulmonary and renal involvement. S/P Rituximab q6 month since 2264-3548 without flare of the disease. As she has been through a quiescent course, we have agreed to space out and/or reduce the dosage of rituximab. Last dose of rituximab was Sept 2018 .   She remains in complete remission on therapy, with no signs of active disease.  She continues to have non-specific complaints - many related to chronic arthralgias.  Her B cells are repopulated.  Her ANCA tests are still completely negative.    Although there is a report of mild nasal crusting (not visible anteriorly on today's examination), Ms Veronica does not think it feels like active inflammation.   We discussed again continuing with expectant observation rather than proceeding with maintenance rituximab.  She acknowledges understanding, and acknowledges that many of her symptoms are related to her weight and low stamina to increase exercise.  We discussed options and I encouraged her to consider water aerobics or a GYM class under supervision.  We agreed not to proceed with a dose of rituximab at this point    Mario Fernandes MD  Division of Renal Disease and Hypertension  Pager: [phone]  June 1, 2019

## 2019-06-01 NOTE — LETTER
"6/1/2019       RE: Waleska Veronica  [address]  [facility] [address]     Dear Colleague,    Thank you for referring your patient, Waleska Veronica, to the [facility] NEPHROLOGY at [facility]. Please see a copy of my visit note below.    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 She was hospitalized x 10 days in [place]. Initially, hemoptysis attributed to pneumonia. She mentioned that her lung was \"messed up enough\" that they could not perform a (transbronchial) lung biopsy. She was put on prednisone and tapered off eventually. While she was at [place], the diagnosis of Wegener's was not confirmed.  March 2013:  Back in [place].  Admitted to [facility] with acute renal failure and scant hemoptysis.  She had significant fatigue, oral ulcers, scant purpura on the forearms ( \"months\" after finishing the course of prednisone).  She did not require dialysis.  Dx of \"Wegener's granulomatosis\"  -> plasmapheresis (x 10+ sessions?) under the care of Dr Lowry (nephrology) .  She received prednisone and was treated with Rituximab once per week for 4 weeks.  April 2013 - 2018  : She was on prednisone for around 4 months. She experienced a lot of side effect including weight gain, mood changes, cushingoid facies.  She denies have hyperglycemia.  She was tapered off prednisone She then received rituximab every 6 months (1000 mg)       Treatment " "summary  March 2013: Plasmapheresis + Rituximab  Rituximab 1000 mg IV every 6 month since initial treatment in 2013 SEPTEMBER 7, 2018: Last dose of Rituximab 1000 mg. (8months after previous dose)      Interval summary  Her main question at this time pertains to the need for continued maintenance therapy with rituximab.  She asks about her risk for relapse.  Since she has been getting rituximab since the onset of the disease, she is not sure about how to move forward. She endorses having joint pain all of the time from multiple kinds of arthritis. She denies nose bleeding/hair loss/mouth sore but would have periodic nose congestion. Overall, she did not have any official flare' of the disease since March 2013. She only has the aforementioned symptoms which she thinks it could be related to cold or other minor illness. Reported feeling good 8/10. The bothersome symptoms were plantar fasciitis s/p steroid injection and back pain. She has not been using CPAP because she was not satisfied with the encounter with sleep providers.    3/2/19  Had a good trip to [place].  Reports no acute illness since the last visit.  Has hot flashes (old)  Dry nose,  No epistaxis.  No oral ulcers.  Cough, with post nasal drip..  Clear but occasional yellow  No F/Chills  Energy:  \" I'm lazy\"  No CP.  No GI symptoms.  No  symptoms.    4/18/19  She feels poorly, in multiple   She is more tired and achy.  \"all joints are hurting\" plantar fasciitis,  Thumbs and knees are hurting (also chronic)  More night sweats over the last 2 weeks.  No F/chills.  NO oral ulcers.  Cough is a little worse from sinus drainage.  More back aches.  One blister on external surface of R arm near elbow, which is now dry and scaly.  She admits to some anxiety because this is the 5th anniversary of her hospitalization.    June 1, 2019  Since last visit she had an URI, bad cough around April 29th.  Flu test was neg.  She eventually got better without antibiotics.  Still " has a little cough.  NO F or Chills.  Continues to have chronic diffuse arthralgias.  Was seen by Dr Leger, and was told she had mild crusting.    Continues to have night sweats every night...  Since .  No oral ulcers or epistaxis.  NO  symptoms.      PAST MEDICAL HISTORY:  Past Medical History:   Diagnosis Date     DVT (deep venous thrombosis) (H)     occured 1 month after starting OCP     GERD (gastroesophageal reflux disease)      Hyperuricemia      Hypothyroid      Obesity (BMI 35.0-39.9 without comorbidity)      Venous insufficiency (chronic) (peripheral)      Wegener's granulomatosis with renal involvement (H)     , No history of complicated pregnancies.  Last pregnancy with twins       PAST SURGICAL HISTORY:  Past Surgical History:   Procedure Laterality Date     HYSTERECTOMY       TUBAL LIGATION            MEDICATIONS:  Prescription Medications as of 2019         Rx Number Disp Refills Start End Last Dispensed Date Next Fill Date Owning Pharmacy    levothyroxine (SYNTHROID/LEVOTHROID) 112 MCG tablet            Sig: Take 112 mcg by mouth    Class: Historical    Route: Oral    LACTOBACILLUS PO            Si tablet    Class: Historical    valACYclovir (VALTREX) 1000 mg tablet    2018        Class: HistoricalNOT TAKING        ALLERGIES:    No Known Allergies     REVIEW OF SYSTEMS:  A comprehensive review of systems was performed and found to be negative except as described here or above.   SOCIAL HISTORY:   Social History     Socioeconomic History     Marital status:      Spouse name: Not on file     Number of children: Not on file     Years of education: Not on file     Highest education level: Not on file   Occupational History     Not on file   Social Needs     Financial resource strain: Not on file     Food insecurity:     Worry: Not on file     Inability: Not on file     Transportation needs:     Medical: Not on file     Non-medical: Not on file   Tobacco Use      Smoking status: Never Smoker     Smokeless tobacco: Never Used   Substance and Sexual Activity     Alcohol use: No     Drug use: No     Sexual activity: Not Currently     Partners: Male     Birth control/protection: None   Lifestyle     Physical activity:     Days per week: Not on file     Minutes per session: Not on file     Stress: Not on file   Relationships     Social connections:     Talks on phone: Not on file     Gets together: Not on file     Attends Hinduism service: Not on file     Active member of club or organization: Not on file     Attends meetings of clubs or organizations: Not on file     Relationship status: Not on file     Intimate partner violence:     Fear of current or ex partner: Not on file     Emotionally abused: Not on file     Physically abused: Not on file     Forced sexual activity: Not on file   Other Topics Concern     Not on file   Social History Narrative     Not on file     Lives in [place]  Family is in town in [place]     FAMILY MEDICAL HISTORY:   Has 4 children, Daughter 48, Daughter 47, fraternal twin : daughter and son age 46    PHYSICAL EXAM:   /79   Pulse 70   Temp 97.7  F (36.5  C) (Oral)   Resp 18   Wt 103.8 kg (228 lb 12.8 oz)   BMI 40.53 kg/m     GENERAL APPEARANCE: alert and no distress, joyful  EYES: nonicteric  HENT: mouth without ulcers or lesions, TM without erythema  NECK: supple, no adenopathy  RESP: lungs clear to auscultation   CV: regular rhythm, normal rate, no rub  ABDOMEN: soft, nontender, normal bowel sounds  Extremities: no clubbing, cyanosis, trace pitting edema left>right  MS: no evidence of inflammation in joints, no muscle tenderness  SKIN: no rash  NEURO: mentation intact and speech normal  PSYCH: affect normal/bright   LABS:       Result See Note 4/11/18  Comment:  KELSEY BRISCOE  MRN:    [id number]  Myeloperoxidase Antibody 0   Comment:  INTERPRETIVE INFORMATION: Myeloperoxidase Abs, IgG    19 AU/mL or Less .........  Negative    20-25 AU/mL .............. Equivocal    26 AU/mL or Greater ...... Positive       Serine Protease 3 Antibody  Test                          Result   Units   Ref. Interval  Serine Protease3, IgG         4        AU/mL   0-19  INTERPRETIVE INFORMATION: Serine Protease 3, IgG  19 AU/mL or Less ........ Negative  20-25 AU/mL ............. Equivocal  26 AU/mL or Greater ..... Positive     Recent Results (from the past 168 hour(s))   Vasculitis panel    Collection Time: 06/01/19 10:56 AM   Result Value Ref Range    Myeloperoxidase Antibody IgG <0.2 0.0 - 0.9 AI    Proteinase 3 Antibody IgG 0.2 0.0 - 0.9 AI   Renal panel    Collection Time: 06/01/19 10:56 AM   Result Value Ref Range    Sodium 141 133 - 144 mmol/L    Potassium 4.1 3.4 - 5.3 mmol/L    Chloride 108 94 - 109 mmol/L    Carbon Dioxide 28 20 - 32 mmol/L    Anion Gap 4 3 - 14 mmol/L    Glucose 158 (H) 70 - 99 mg/dL    Urea Nitrogen 17 7 - 30 mg/dL    Creatinine 0.88 0.52 - 1.04 mg/dL    GFR Estimate 67 >60 mL/min/[1.73_m2]    GFR Estimate If Black 77 >60 mL/min/[1.73_m2]    Calcium 8.6 8.5 - 10.1 mg/dL    Phosphorus 3.6 2.5 - 4.5 mg/dL    Albumin 3.2 (L) 3.4 - 5.0 g/dL   CD19 B Cell Count    Collection Time: 06/01/19 10:56 AM   Result Value Ref Range    CD19 B Cells 8 6 - 27 %    Absolute CD19 133 107 - 698 cells/uL   UA reflex to Microscopic    Collection Time: 06/01/19 11:05 AM   Result Value Ref Range    Color Urine Yellow     Appearance Urine Clear     Glucose Urine Negative NEG^Negative mg/dL    Bilirubin Urine Negative NEG^Negative    Ketones Urine Negative NEG^Negative mg/dL    Specific Gravity Urine 1.019 1.003 - 1.035    Blood Urine Small (A) NEG^Negative    pH Urine 5.0 5.0 - 7.0 pH    Protein Albumin Urine Negative NEG^Negative mg/dL    Urobilinogen mg/dL 0.0 0.0 - 2.0 mg/dL    Nitrite Urine Negative NEG^Negative    Leukocyte Esterase Urine Moderate (A) NEG^Negative    Source Midstream Urine     RBC Urine 3 (H) 0 - 2 /HPF    WBC Urine 10 (H)  0 - 5 /HPF    Squamous Epithelial /HPF Urine 3 (H) 0 - 1 /HPF    Mucous Urine Present (A) NEG^Negative /LPF     Albumin   Date Value Ref Range Status   06/01/2019 3.2 (L) 3.4 - 5.0 g/dL Final   04/18/2019 3.3 (L) 3.4 - 5.0 g/dL Final   03/02/2019 3.2 (L) 3.4 - 5.0 g/dL Final   01/19/2019 3.3 (L) 3.4 - 5.0 g/dL Final         URINE STUDIES  Recent Labs   Lab Test 06/01/19  1105 04/18/19  1745 03/02/19  1106 11/17/18  0756   COLOR Yellow Yellow Yellow Yellow   APPEARANCE Clear Clear Slightly Cloudy Clear   URINEGLC Negative Negative Negative Negative   URINEBILI Negative Negative Negative Negative   URINEKETONE Negative Negative Negative Negative   SG 1.019 1.020 1.020 1.018   UBLD Small* Negative Small* Small*   URINEPH 5.0 6.0 5.0 5.0   PROTEIN Negative Negative Negative Negative   NITRITE Negative Negative Negative Negative   LEUKEST Moderate* Large* Large* Small*   RBCU 3* 3* 5* 1   WBCU 10* 29* 15* 2         CT Chest without IV contrast 6/2/2015  Result Impression     1. 8 mm nodule, with adjacent smaller nodules, in the left lower lobe has been stable since outside chest CT 04/21/2014. Recommend follow-up imaging in 6-12 months.   2. Stable bilateral patchy ground glass opacities, likely due to shallow inspiration.   3. Mild right hilar adenopathy, which is likely reactive.     FINDINGS:  Stable 8 mm nodule in the superior segment of the left lower lobe; this nodule has been stable since outside chest CT 04/21/2014. The two surrounding smaller nodules are relatively stable in appearance since 12/01/2014 (these nodules were obscured on outside chest CT). No new pulmonary nodules. Stable scattered groundglass opacities in both lungs which may be due to shallow inspiration. Tiny calcified granuloma right lung apex. Trace amount of atelectasis in the lingula. Scattered mediastinal and left hilar lymph nodes are more prominent on today's exam, however remain within normal limits for size. New mild right hilar  adenopathy, with largest node measuring approximately 1 cm in maximal diameter.     Electronically signed by:    MAGALIS Vargas -[id number] 02-Jun-2015 10:46        Mario Freeman MD [phone] 02-Jun-2015 10:46       CT lung 2018: Stable lung nodules and 1 mm non-obstructing stone at upper pole of left kidney    ASSESSMENT AND PLAN:  # GPA (dx 2013)  Presented with pulmonary and renal involvement. S/P Rituximab q6 month since 5670-1171 without flare of the disease. As she has been through a quiescent course, we have agreed to space out and/or reduce the dosage of rituximab. Last dose of rituximab was Sept 2018 .   She remains in complete remission on therapy, with no signs of active disease.  She continues to have non-specific complaints - many related to chronic arthralgias.  Her B cells are repopulated.  Her ANCA tests are still completely negative.    Although there is a report of mild nasal crusting (not visible anteriorly on today's examination), Ms Veronica does not think it feels like active inflammation.   We discussed again continuing with expectant observation rather than proceeding with maintenance rituximab.  She acknowledges understanding, and acknowledges that many of her symptoms are related to her weight and low stamina to increase exercise.  We discussed options and I encouraged her to consider water aerobics or a GYM class under supervision.  We agreed not to proceed with a dose of rituximab at this point    Mario Fernandes MD  Division of Renal Disease and Hypertension  Pager: [phone]  June 1, 2019                Again, thank you for allowing me to participate in the care of your patient.      Sincerely,    Mario Fernandes MD

## 2019-06-01 NOTE — LETTER
Date:June 4, 2019      Patient was self referred, no letter generated. Do not send.        [facility] Health Information

## 2019-06-01 NOTE — NURSING NOTE
Chief Complaint   Patient presents with     RECHECK     Kidney follow up     Blood pressure 117/79, pulse 70, temperature 97.7  F (36.5  C), temperature source Oral, resp. rate 18, weight 103.8 kg (228 lb 12.8 oz).    Fco Cain CMA on 6/1/2019 at 10:07 AM

## 2019-06-01 NOTE — LETTER
Date:June 4, 2019      Patient was self referred, no letter generated. Do not send.        [facility] Health Information

## 2019-06-01 NOTE — LETTER
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 She was hospitalized x 10 days in [place]. Initially, hemoptysis attributed to pneumonia. She mentioned that her lung was \"messed up enough\" that they could not perform a (transbronchial) lung biopsy. She was put on prednisone and tapered off eventually. While she was at [place], the diagnosis of Wegener's was not confirmed.  March 2013:  Back in [place].  Admitted to [facility] with acute renal failure and scant hemoptysis.  She had significant fatigue, oral ulcers, scant purpura on the forearms ( \"months\" after finishing the course of prednisone).  She did not require dialysis.  Dx of \"Wegener's granulomatosis\"  -> plasmapheresis (x 10+ sessions?) under the care of Dr Lowry (nephrology) .  She received prednisone and was treated with Rituximab once per week for 4 weeks.  April 2013 - 2018  : She was on prednisone for around 4 months. She experienced a lot of side effect including weight gain, mood changes, cushingoid facies.  She denies have hyperglycemia.  She was tapered off prednisone She then received rituximab every 6 months (1000 mg)       Treatment summary  March 2013: Plasmapheresis + Rituximab  Rituximab 1000 mg IV every 6 month since initial treatment in 2013 SEPTEMBER 7, 2018: Last dose of Rituximab 1000 mg. (8months after previous dose)  " "    Interval summary  Her main question at this time pertains to the need for continued maintenance therapy with rituximab.  She asks about her risk for relapse.  Since she has been getting rituximab since the onset of the disease, she is not sure about how to move forward. She endorses having joint pain all of the time from multiple kinds of arthritis. She denies nose bleeding/hair loss/mouth sore but would have periodic nose congestion. Overall, she did not have any official flare' of the disease since March 2013. She only has the aforementioned symptoms which she thinks it could be related to cold or other minor illness. Reported feeling good 8/10. The bothersome symptoms were plantar fasciitis s/p steroid injection and back pain. She has not been using CPAP because she was not satisfied with the encounter with sleep providers.    3/2/19  Had a good trip to [place].  Reports no acute illness since the last visit.  Has hot flashes (old)  Dry nose,  No epistaxis.  No oral ulcers.  Cough, with post nasal drip..  Clear but occasional yellow  No F/Chills  Energy:  \" I'm lazy\"  No CP.  No GI symptoms.  No  symptoms.    4/18/19  She feels poorly, in multiple   She is more tired and achy.  \"all joints are hurting\" plantar fasciitis,  Thumbs and knees are hurting (also chronic)  More night sweats over the last 2 weeks.  No F/chills.  NO oral ulcers.  Cough is a little worse from sinus drainage.  More back aches.  One blister on external surface of R arm near elbow, which is now dry and scaly.  She admits to some anxiety because this is the 5th anniversary of her hospitalization.    June 1, 2019  Since last visit she had an URI, bad cough around April 29th.  Flu test was neg.  She eventually got better without antibiotics.  Still has a little cough.  NO F or Chills.  Continues to have chronic diffuse arthralgias.  Was seen by Dr Leger, and was told she had mild crusting.    Continues to have night sweats every night...  Since " .  No oral ulcers or epistaxis.  NO  symptoms.      PAST MEDICAL HISTORY:  Past Medical History:   Diagnosis Date     DVT (deep venous thrombosis) (H)     occured 1 month after starting OCP     GERD (gastroesophageal reflux disease)      Hyperuricemia      Hypothyroid      Obesity (BMI 35.0-39.9 without comorbidity)      Venous insufficiency (chronic) (peripheral)      Wegener's granulomatosis with renal involvement (H)     , No history of complicated pregnancies.  Last pregnancy with twins       PAST SURGICAL HISTORY:  Past Surgical History:   Procedure Laterality Date     HYSTERECTOMY       TUBAL LIGATION            MEDICATIONS:  Prescription Medications as of 2019         Rx Number Disp Refills Start End Last Dispensed Date Next Fill Date Owning Pharmacy    levothyroxine (SYNTHROID/LEVOTHROID) 112 MCG tablet            Sig: Take 112 mcg by mouth    Class: Historical    Route: Oral    LACTOBACILLUS PO            Si tablet    Class: Historical    valACYclovir (VALTREX) 1000 mg tablet    2018        Class: HistoricalNOT TAKING        ALLERGIES:    No Known Allergies     REVIEW OF SYSTEMS:  A comprehensive review of systems was performed and found to be negative except as described here or above.   SOCIAL HISTORY:   Social History     Socioeconomic History     Marital status:      Spouse name: Not on file     Number of children: Not on file     Years of education: Not on file     Highest education level: Not on file   Occupational History     Not on file   Social Needs     Financial resource strain: Not on file     Food insecurity:     Worry: Not on file     Inability: Not on file     Transportation needs:     Medical: Not on file     Non-medical: Not on file   Tobacco Use     Smoking status: Never Smoker     Smokeless tobacco: Never Used   Substance and Sexual Activity     Alcohol use: No     Drug use: No     Sexual activity: Not Currently     Partners: Male     Birth  control/protection: None   Lifestyle     Physical activity:     Days per week: Not on file     Minutes per session: Not on file     Stress: Not on file   Relationships     Social connections:     Talks on phone: Not on file     Gets together: Not on file     Attends Mandaeism service: Not on file     Active member of club or organization: Not on file     Attends meetings of clubs or organizations: Not on file     Relationship status: Not on file     Intimate partner violence:     Fear of current or ex partner: Not on file     Emotionally abused: Not on file     Physically abused: Not on file     Forced sexual activity: Not on file   Other Topics Concern     Not on file   Social History Narrative     Not on file     Lives in [place]  Family is in town in [place]     FAMILY MEDICAL HISTORY:   Has 4 children, Daughter 48, Daughter 47, fraternal twin : daughter and son age 46    PHYSICAL EXAM:   /79   Pulse 70   Temp 97.7  F (36.5  C) (Oral)   Resp 18   Wt 103.8 kg (228 lb 12.8 oz)   BMI 40.53 kg/m     GENERAL APPEARANCE: alert and no distress, joyful  EYES: nonicteric  HENT: mouth without ulcers or lesions, TM without erythema  NECK: supple, no adenopathy  RESP: lungs clear to auscultation   CV: regular rhythm, normal rate, no rub  ABDOMEN: soft, nontender, normal bowel sounds  Extremities: no clubbing, cyanosis, trace pitting edema left>right  MS: no evidence of inflammation in joints, no muscle tenderness  SKIN: no rash  NEURO: mentation intact and speech normal  PSYCH: affect normal/bright   LABS:       Result See Note 4/11/18  Comment:  KELSEY BRISCOE  MRN:    [id number]  Myeloperoxidase Antibody 0   Comment:  INTERPRETIVE INFORMATION: Myeloperoxidase Abs, IgG    19 AU/mL or Less ......... Negative    20-25 AU/mL .............. Equivocal    26 AU/mL or Greater ...... Positive       Serine Protease 3 Antibody  Test                          Result   Units   Ref. Interval  Serine Protease3,  IgG         4        AU/mL   0-19  INTERPRETIVE INFORMATION: Serine Protease 3, IgG  19 AU/mL or Less ........ Negative  20-25 AU/mL ............. Equivocal  26 AU/mL or Greater ..... Positive     Recent Results (from the past 168 hour(s))   Vasculitis panel    Collection Time: 06/01/19 10:56 AM   Result Value Ref Range    Myeloperoxidase Antibody IgG <0.2 0.0 - 0.9 AI    Proteinase 3 Antibody IgG 0.2 0.0 - 0.9 AI   Renal panel    Collection Time: 06/01/19 10:56 AM   Result Value Ref Range    Sodium 141 133 - 144 mmol/L    Potassium 4.1 3.4 - 5.3 mmol/L    Chloride 108 94 - 109 mmol/L    Carbon Dioxide 28 20 - 32 mmol/L    Anion Gap 4 3 - 14 mmol/L    Glucose 158 (H) 70 - 99 mg/dL    Urea Nitrogen 17 7 - 30 mg/dL    Creatinine 0.88 0.52 - 1.04 mg/dL    GFR Estimate 67 >60 mL/min/[1.73_m2]    GFR Estimate If Black 77 >60 mL/min/[1.73_m2]    Calcium 8.6 8.5 - 10.1 mg/dL    Phosphorus 3.6 2.5 - 4.5 mg/dL    Albumin 3.2 (L) 3.4 - 5.0 g/dL   CD19 B Cell Count    Collection Time: 06/01/19 10:56 AM   Result Value Ref Range    CD19 B Cells 8 6 - 27 %    Absolute CD19 133 107 - 698 cells/uL   UA reflex to Microscopic    Collection Time: 06/01/19 11:05 AM   Result Value Ref Range    Color Urine Yellow     Appearance Urine Clear     Glucose Urine Negative NEG^Negative mg/dL    Bilirubin Urine Negative NEG^Negative    Ketones Urine Negative NEG^Negative mg/dL    Specific Gravity Urine 1.019 1.003 - 1.035    Blood Urine Small (A) NEG^Negative    pH Urine 5.0 5.0 - 7.0 pH    Protein Albumin Urine Negative NEG^Negative mg/dL    Urobilinogen mg/dL 0.0 0.0 - 2.0 mg/dL    Nitrite Urine Negative NEG^Negative    Leukocyte Esterase Urine Moderate (A) NEG^Negative    Source Midstream Urine     RBC Urine 3 (H) 0 - 2 /HPF    WBC Urine 10 (H) 0 - 5 /HPF    Squamous Epithelial /HPF Urine 3 (H) 0 - 1 /HPF    Mucous Urine Present (A) NEG^Negative /LPF     Albumin   Date Value Ref Range Status   06/01/2019 3.2 (L) 3.4 - 5.0 g/dL Final    04/18/2019 3.3 (L) 3.4 - 5.0 g/dL Final   03/02/2019 3.2 (L) 3.4 - 5.0 g/dL Final   01/19/2019 3.3 (L) 3.4 - 5.0 g/dL Final         URINE STUDIES  Recent Labs   Lab Test 06/01/19  1105 04/18/19  1745 03/02/19  1106 11/17/18  0756   COLOR Yellow Yellow Yellow Yellow   APPEARANCE Clear Clear Slightly Cloudy Clear   URINEGLC Negative Negative Negative Negative   URINEBILI Negative Negative Negative Negative   URINEKETONE Negative Negative Negative Negative   SG 1.019 1.020 1.020 1.018   UBLD Small* Negative Small* Small*   URINEPH 5.0 6.0 5.0 5.0   PROTEIN Negative Negative Negative Negative   NITRITE Negative Negative Negative Negative   LEUKEST Moderate* Large* Large* Small*   RBCU 3* 3* 5* 1   WBCU 10* 29* 15* 2         CT Chest without IV contrast 6/2/2015  Result Impression     1. 8 mm nodule, with adjacent smaller nodules, in the left lower lobe has been stable since outside chest CT 04/21/2014. Recommend follow-up imaging in 6-12 months.   2. Stable bilateral patchy ground glass opacities, likely due to shallow inspiration.   3. Mild right hilar adenopathy, which is likely reactive.     FINDINGS:  Stable 8 mm nodule in the superior segment of the left lower lobe; this nodule has been stable since outside chest CT 04/21/2014. The two surrounding smaller nodules are relatively stable in appearance since 12/01/2014 (these nodules were obscured on outside chest CT). No new pulmonary nodules. Stable scattered groundglass opacities in both lungs which may be due to shallow inspiration. Tiny calcified granuloma right lung apex. Trace amount of atelectasis in the lingula. Scattered mediastinal and left hilar lymph nodes are more prominent on today's exam, however remain within normal limits for size. New mild right hilar adenopathy, with largest node measuring approximately 1 cm in maximal diameter.     Electronically signed by:    MAGALIS Vargas -[id number] 02-Jun-2015 10:46        Mario Freeman MD [phone] 02-Jun-2015 10:46        CT lung 2018: Stable lung nodules and 1 mm non-obstructing stone at upper pole of left kidney    ASSESSMENT AND PLAN:  # GPA (dx 2013)  Presented with pulmonary and renal involvement. S/P Rituximab q6 month since 2431-5566 without flare of the disease. As she has been through a quiescent course, we have agreed to space out and/or reduce the dosage of rituximab. Last dose of rituximab was Sept 2018 .   She remains in complete remission on therapy, with no signs of active disease.  She continues to have non-specific complaints - many related to chronic arthralgias.  Her B cells are repopulated.  Her ANCA tests are still completely negative.    Although there is a report of mild nasal crusting (not visible anteriorly on today's examination), Ms Veronica does not think it feels like active inflammation.   We discussed again continuing with expectant observation rather than proceeding with maintenance rituximab.  She acknowledges understanding, and acknowledges that many of her symptoms are related to her weight and low stamina to increase exercise.  We discussed options and I encouraged her to consider water aerobics or a GYM class under supervision.  We agreed not to proceed with a dose of rituximab at this point    Mario Fernandes MD  Division of Renal Disease and Hypertension  Pager: [phone]  June 1, 2019                Mario Fernandes MD

## 2019-06-02 LAB
MYELOPEROXIDASE AB SER-ACNC: <0.2 AI (ref 0–0.9)
PROTEINASE3 IGG SER-ACNC: 0.2 AI (ref 0–0.9)

## 2019-08-02 DIAGNOSIS — I77.82 ANCA-ASSOCIATED VASCULITIS (H): Primary | ICD-10-CM

## 2019-08-10 DIAGNOSIS — I77.82 ANCA-ASSOCIATED VASCULITIS (H): ICD-10-CM

## 2019-08-10 LAB
ALBUMIN SERPL-MCNC: 3.2 G/DL (ref 3.4–5)
ANION GAP SERPL CALCULATED.3IONS-SCNC: 5 MMOL/L (ref 3–14)
BASOPHILS # BLD AUTO: 0.1 10E9/L (ref 0–0.2)
BASOPHILS NFR BLD AUTO: 0.9 %
BUN SERPL-MCNC: 18 MG/DL (ref 7–30)
CALCIUM SERPL-MCNC: 8.3 MG/DL (ref 8.5–10.1)
CHLORIDE SERPL-SCNC: 108 MMOL/L (ref 94–109)
CO2 SERPL-SCNC: 28 MMOL/L (ref 20–32)
CREAT SERPL-MCNC: 0.84 MG/DL (ref 0.52–1.04)
DIFFERENTIAL METHOD BLD: ABNORMAL
EOSINOPHIL # BLD AUTO: 0.4 10E9/L (ref 0–0.7)
EOSINOPHIL NFR BLD AUTO: 3.6 %
ERYTHROCYTE [DISTWIDTH] IN BLOOD BY AUTOMATED COUNT: 13.3 % (ref 10–15)
GFR SERPL CREATININE-BSD FRML MDRD: 71 ML/MIN/{1.73_M2}
GLUCOSE SERPL-MCNC: 193 MG/DL (ref 70–99)
HCT VFR BLD AUTO: 43.7 % (ref 35–47)
HGB BLD-MCNC: 13.6 G/DL (ref 11.7–15.7)
IMM GRANULOCYTES # BLD: 0 10E9/L (ref 0–0.4)
IMM GRANULOCYTES NFR BLD: 0.3 %
LYMPHOCYTES # BLD AUTO: 1.5 10E9/L (ref 0.8–5.3)
LYMPHOCYTES NFR BLD AUTO: 15.4 %
MCH RBC QN AUTO: 29 PG (ref 26.5–33)
MCHC RBC AUTO-ENTMCNC: 31.1 G/DL (ref 31.5–36.5)
MCV RBC AUTO: 93 FL (ref 78–100)
MONOCYTES # BLD AUTO: 0.5 10E9/L (ref 0–1.3)
MONOCYTES NFR BLD AUTO: 4.9 %
NEUTROPHILS # BLD AUTO: 7.3 10E9/L (ref 1.6–8.3)
NEUTROPHILS NFR BLD AUTO: 74.9 %
NRBC # BLD AUTO: 0 10*3/UL
NRBC BLD AUTO-RTO: 0 /100
PHOSPHATE SERPL-MCNC: 3.2 MG/DL (ref 2.5–4.5)
PLATELET # BLD AUTO: 278 10E9/L (ref 150–450)
POTASSIUM SERPL-SCNC: 4.6 MMOL/L (ref 3.4–5.3)
RBC # BLD AUTO: 4.69 10E12/L (ref 3.8–5.2)
SODIUM SERPL-SCNC: 140 MMOL/L (ref 133–144)
WBC # BLD AUTO: 9.8 10E9/L (ref 4–11)

## 2019-08-10 PROCEDURE — 83876 ASSAY MYELOPEROXIDASE: CPT | Performed by: INTERNAL MEDICINE

## 2019-08-10 PROCEDURE — 83516 IMMUNOASSAY NONANTIBODY: CPT | Performed by: INTERNAL MEDICINE

## 2019-08-11 LAB
MYELOPEROXIDASE AB SER-ACNC: <0.2 AI (ref 0–0.9)
PROTEINASE3 IGG SER-ACNC: 0.5 AI (ref 0–0.9)

## 2019-09-26 ENCOUNTER — OFFICE VISIT (OUTPATIENT)
Dept: NEPHROLOGY | Facility: CLINIC | Age: 69
End: 2019-09-26
Attending: INTERNAL MEDICINE
Payer: MEDICARE

## 2019-09-26 VITALS
HEIGHT: 63 IN | OXYGEN SATURATION: 96 % | HEART RATE: 77 BPM | TEMPERATURE: 97.5 F | SYSTOLIC BLOOD PRESSURE: 122 MMHG | WEIGHT: 226.6 LBS | DIASTOLIC BLOOD PRESSURE: 80 MMHG | BODY MASS INDEX: 40.15 KG/M2

## 2019-09-26 DIAGNOSIS — I77.6 VASCULITIS (H): ICD-10-CM

## 2019-09-26 DIAGNOSIS — I77.6 VASCULITIS (H): Primary | ICD-10-CM

## 2019-09-26 LAB
ALBUMIN SERPL-MCNC: 3.4 G/DL (ref 3.4–5)
ALBUMIN UR-MCNC: NEGATIVE MG/DL
ANION GAP SERPL CALCULATED.3IONS-SCNC: 5 MMOL/L (ref 3–14)
APPEARANCE UR: CLEAR
BASOPHILS # BLD AUTO: 0.1 10E9/L (ref 0–0.2)
BASOPHILS NFR BLD AUTO: 0.8 %
BILIRUB UR QL STRIP: NEGATIVE
BUN SERPL-MCNC: 18 MG/DL (ref 7–30)
CALCIUM SERPL-MCNC: 8.7 MG/DL (ref 8.5–10.1)
CHLORIDE SERPL-SCNC: 105 MMOL/L (ref 94–109)
CO2 SERPL-SCNC: 26 MMOL/L (ref 20–32)
COLOR UR AUTO: YELLOW
CREAT SERPL-MCNC: 0.81 MG/DL (ref 0.52–1.04)
DIFFERENTIAL METHOD BLD: ABNORMAL
EOSINOPHIL # BLD AUTO: 0.4 10E9/L (ref 0–0.7)
EOSINOPHIL NFR BLD AUTO: 3 %
ERYTHROCYTE [DISTWIDTH] IN BLOOD BY AUTOMATED COUNT: 13.5 % (ref 10–15)
GFR SERPL CREATININE-BSD FRML MDRD: 74 ML/MIN/{1.73_M2}
GLUCOSE SERPL-MCNC: 83 MG/DL (ref 70–99)
GLUCOSE UR STRIP-MCNC: NEGATIVE MG/DL
HCT VFR BLD AUTO: 42.1 % (ref 35–47)
HGB BLD-MCNC: 13.5 G/DL (ref 11.7–15.7)
HGB UR QL STRIP: ABNORMAL
IMM GRANULOCYTES # BLD: 0 10E9/L (ref 0–0.4)
IMM GRANULOCYTES NFR BLD: 0.3 %
KETONES UR STRIP-MCNC: NEGATIVE MG/DL
LEUKOCYTE ESTERASE UR QL STRIP: ABNORMAL
LYMPHOCYTES # BLD AUTO: 2.3 10E9/L (ref 0.8–5.3)
LYMPHOCYTES NFR BLD AUTO: 19.7 %
MCH RBC QN AUTO: 29.3 PG (ref 26.5–33)
MCHC RBC AUTO-ENTMCNC: 32.1 G/DL (ref 31.5–36.5)
MCV RBC AUTO: 91 FL (ref 78–100)
MONOCYTES # BLD AUTO: 0.7 10E9/L (ref 0–1.3)
MONOCYTES NFR BLD AUTO: 6.2 %
MUCOUS THREADS #/AREA URNS LPF: PRESENT /LPF
NEUTROPHILS # BLD AUTO: 8.3 10E9/L (ref 1.6–8.3)
NEUTROPHILS NFR BLD AUTO: 70 %
NITRATE UR QL: NEGATIVE
NRBC # BLD AUTO: 0 10*3/UL
NRBC BLD AUTO-RTO: 0 /100
PH UR STRIP: 5 PH (ref 5–7)
PHOSPHATE SERPL-MCNC: 3.8 MG/DL (ref 2.5–4.5)
PLATELET # BLD AUTO: 323 10E9/L (ref 150–450)
POTASSIUM SERPL-SCNC: 4 MMOL/L (ref 3.4–5.3)
RBC # BLD AUTO: 4.61 10E12/L (ref 3.8–5.2)
RBC #/AREA URNS AUTO: 1 /HPF (ref 0–2)
SODIUM SERPL-SCNC: 136 MMOL/L (ref 133–144)
SOURCE: ABNORMAL
SP GR UR STRIP: 1.02 (ref 1–1.03)
SQUAMOUS #/AREA URNS AUTO: 1 /HPF (ref 0–1)
UROBILINOGEN UR STRIP-MCNC: 2 MG/DL (ref 0–2)
WBC # BLD AUTO: 11.8 10E9/L (ref 4–11)
WBC #/AREA URNS AUTO: 1 /HPF (ref 0–5)

## 2019-09-26 PROCEDURE — 81001 URINALYSIS AUTO W/SCOPE: CPT | Performed by: INTERNAL MEDICINE

## 2019-09-26 PROCEDURE — 83516 IMMUNOASSAY NONANTIBODY: CPT | Performed by: INTERNAL MEDICINE

## 2019-09-26 PROCEDURE — 36415 COLL VENOUS BLD VENIPUNCTURE: CPT | Performed by: INTERNAL MEDICINE

## 2019-09-26 PROCEDURE — 83876 ASSAY MYELOPEROXIDASE: CPT | Performed by: INTERNAL MEDICINE

## 2019-09-26 PROCEDURE — 85025 COMPLETE CBC W/AUTO DIFF WBC: CPT | Performed by: INTERNAL MEDICINE

## 2019-09-26 PROCEDURE — 80069 RENAL FUNCTION PANEL: CPT | Performed by: INTERNAL MEDICINE

## 2019-09-26 PROCEDURE — G0463 HOSPITAL OUTPT CLINIC VISIT: HCPCS | Mod: ZF

## 2019-09-26 RX ORDER — METHYLPREDNISOLONE SODIUM SUCCINATE 125 MG/2ML
80 INJECTION, POWDER, LYOPHILIZED, FOR SOLUTION INTRAMUSCULAR; INTRAVENOUS ONCE
Status: CANCELLED | OUTPATIENT
Start: 2019-09-30

## 2019-09-26 RX ORDER — DIPHENHYDRAMINE HCL 25 MG
50 CAPSULE ORAL ONCE
Status: CANCELLED
Start: 2019-09-30

## 2019-09-26 RX ORDER — ACETAMINOPHEN 325 MG/1
650 TABLET ORAL ONCE
Status: CANCELLED
Start: 2019-09-30

## 2019-09-26 ASSESSMENT — MIFFLIN-ST. JEOR: SCORE: 1526.98

## 2019-09-26 ASSESSMENT — PAIN SCALES - GENERAL: PAINLEVEL: MODERATE PAIN (4)

## 2019-09-26 NOTE — LETTER
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 She was hospitalized x 10 days in [place]. Initially, hemoptysis attributed to pneumonia. She mentioned that her lung was \"messed up enough\" that they could not perform a (transbronchial) lung biopsy. She was put on prednisone and tapered off eventually. While she was at [place], the diagnosis of Wegener's was not confirmed.  March 2013:  Back in [place].  Admitted to [facility] with acute renal failure and scant hemoptysis.  She had significant fatigue, oral ulcers, scant purpura on the forearms ( \"months\" after finishing the course of prednisone).  She did not require dialysis.  Dx of \"Wegener's granulomatosis\"  -> plasmapheresis (x 10+ sessions?) under the care of Dr Lowry (nephrology) .  She received prednisone and was treated with Rituximab once per week for 4 weeks.  April 2013 - 2018  : She was on prednisone for around 4 months. She experienced a lot of side effect including weight gain, mood changes, cushingoid facies.  She denies have hyperglycemia.  She was tapered off prednisone She then received rituximab every 6 months (1000 mg)       Treatment summary  March 2013: Plasmapheresis + Rituximab  Rituximab 1000 mg IV every 6 month since initial treatment in 2013 SEPTEMBER 7, 2018: Last dose of Rituximab 1000 mg. (8months after previous dose)  " "    Interval summary  Her main question at this time pertains to the need for continued maintenance therapy with rituximab.  She asks about her risk for relapse.  Since she has been getting rituximab since the onset of the disease, she is not sure about how to move forward. She endorses having joint pain all of the time from multiple kinds of arthritis. She denies nose bleeding/hair loss/mouth sore but would have periodic nose congestion. Overall, she did not have any official flare' of the disease since March 2013. She only has the aforementioned symptoms which she thinks it could be related to cold or other minor illness. Reported feeling good 8/10. The bothersome symptoms were plantar fasciitis s/p steroid injection and back pain. She has not been using CPAP because she was not satisfied with the encounter with sleep providers.    3/2/19  Had a good trip to [place].  Reports no acute illness since the last visit.  Has hot flashes (old)  Dry nose,  No epistaxis.  No oral ulcers.  Cough, with post nasal drip..  Clear but occasional yellow  No F/Chills  Energy:  \" I'm lazy\"  No CP.  No GI symptoms.  No  symptoms.    4/18/19  She feels poorly, in multiple   She is more tired and achy.  \"all joints are hurting\" plantar fasciitis,  Thumbs and knees are hurting (also chronic)  More night sweats over the last 2 weeks.  No F/chills.  NO oral ulcers.  Cough is a little worse from sinus drainage.  More back aches.  One blister on external surface of R arm near elbow, which is now dry and scaly.  She admits to some anxiety because this is the 5th anniversary of her hospitalization.    June 1, 2019  Since last visit she had an URI, bad cough around April 29th.  Flu test was neg.  She eventually got better without antibiotics.  Still has a little cough.  NO F or Chills.  Continues to have chronic diffuse arthralgias.  Was seen by Dr Leger, and was told she had mild crusting.    Continues to have night sweats every night...  Since " ".  No oral ulcers or epistaxis.  NO  symptoms.      2019  Ms Veronica presents for follow up.  She presents feeling generally close to her baseline, but has a number of complaints.  She reports increased fatigue and arthralgias.    Increased frequency of night sweats, and \"hot flashes\" occurring during daytime.  No overt fevers or chills.  No over rash but has 3 new small macular purpuric spots on extensor surface of R forearm  No L Ext swelling.  No cough or hemoptysis, no oral ulcers.      PAST MEDICAL HISTORY:  Past Medical History:   Diagnosis Date     DVT (deep venous thrombosis) (H)     occured 1 month after starting OCP     GERD (gastroesophageal reflux disease)      Hyperuricemia      Hypothyroid      Obesity (BMI 35.0-39.9 without comorbidity)      Venous insufficiency (chronic) (peripheral)      Wegener's granulomatosis with renal involvement (H)     , No history of complicated pregnancies.  Last pregnancy with twins       PAST SURGICAL HISTORY:  Past Surgical History:   Procedure Laterality Date     HYSTERECTOMY       TUBAL LIGATION            MEDICATIONS:  Prescription Medications as of 2019        Rx Number Disp Refills Start End Last Dispensed Date Next Fill Date Owning Pharmacy    levothyroxine (SYNTHROID/LEVOTHROID) 112 MCG tablet            Sig: Take 112 mcg by mouth    Class: Historical    Route: Oral    LACTOBACILLUS PO            Si tablet    Class: Historical    valACYclovir (VALTREX) 1000 mg tablet    2018        Class: HistoricalNOT TAKING        ALLERGIES:    No Known Allergies     REVIEW OF SYSTEMS:  A comprehensive review of systems was performed and found to be negative except as described here or above.   SOCIAL HISTORY:   Social History     Socioeconomic History     Marital status:      Spouse name: Not on file     Number of children: Not on file     Years of education: Not on file     Highest education level: Not on file " "  Occupational History     Not on file   Social Needs     Financial resource strain: Not on file     Food insecurity:     Worry: Not on file     Inability: Not on file     Transportation needs:     Medical: Not on file     Non-medical: Not on file   Tobacco Use     Smoking status: Never Smoker     Smokeless tobacco: Never Used   Substance and Sexual Activity     Alcohol use: No     Drug use: No     Sexual activity: Not Currently     Partners: Male     Birth control/protection: None   Lifestyle     Physical activity:     Days per week: Not on file     Minutes per session: Not on file     Stress: Not on file   Relationships     Social connections:     Talks on phone: Not on file     Gets together: Not on file     Attends [employer] service: Not on file     Active member of club or organization: Not on file     Attends meetings of clubs or organizations: Not on file     Relationship status: Not on file     Intimate partner violence:     Fear of current or ex partner: Not on file     Emotionally abused: Not on file     Physically abused: Not on file     Forced sexual activity: Not on file   Other Topics Concern     Not on file   Social History Narrative     Not on file     Lives in [place]  Family is in [place]     FAMILY MEDICAL HISTORY:   Has 4 children, Daughter 48, Daughter 47, fraternal twin : daughter and son age 46    PHYSICAL EXAM:   /80   Pulse 77   Temp 97.5  F (36.4  C) (Oral)   Ht 1.6 m (5' 3\")   Wt 102.8 kg (226 lb 9.6 oz)   SpO2 96%   BMI 40.14 kg/m     GENERAL APPEARANCE: alert and no distress, joyful  EYES: nonicteric  HENT: mouth without ulcers or lesions, TM without erythema  NECK: supple, no adenopathy  RESP: lungs clear to auscultation   CV: regular rhythm, normal rate, no rub  ABDOMEN: soft, nontender, normal bowel sounds  Extremities: no clubbing, cyanosis, trace pitting edema left>right  MS: no evidence of inflammation in joints, no muscle tenderness  SKIN: no rash  NEURO: " mentation intact and speech normal  PSYCH: affect normal/bright   LABS:     Result See Note 4/11/18  Comment:  KELSEY BRISCOE  MRN:    [id number]  Myeloperoxidase Antibody 0   Comment:  INTERPRETIVE INFORMATION: Myeloperoxidase Abs, IgG    19 AU/mL or Less ......... Negative    20-25 AU/mL .............. Equivocal    26 AU/mL or Greater ...... Positive       Serine Protease 3 Antibody  Test                          Result   Units   Ref. Interval  Serine Protease3, IgG         4        AU/mL   0-19  INTERPRETIVE INFORMATION: Serine Protease 3, IgG  19 AU/mL or Less ........ Negative  20-25 AU/mL ............. Equivocal  26 AU/mL or Greater ..... Positive     No results found for this or any previous visit (from the past 168 hour(s)).  Albumin   Date Value Ref Range Status   08/10/2019 3.2 (L) 3.4 - 5.0 g/dL Final   06/01/2019 3.2 (L) 3.4 - 5.0 g/dL Final   04/18/2019 3.3 (L) 3.4 - 5.0 g/dL Final   03/02/2019 3.2 (L) 3.4 - 5.0 g/dL Final   01/19/2019 3.3 (L) 3.4 - 5.0 g/dL Final         URINE STUDIES  Recent Labs   Lab Test 06/01/19  1105 04/18/19  1745 03/02/19  1106 11/17/18  0756   COLOR Yellow Yellow Yellow Yellow   APPEARANCE Clear Clear Slightly Cloudy Clear   URINEGLC Negative Negative Negative Negative   URINEBILI Negative Negative Negative Negative   URINEKETONE Negative Negative Negative Negative   SG 1.019 1.020 1.020 1.018   UBLD Small* Negative Small* Small*   URINEPH 5.0 6.0 5.0 5.0   PROTEIN Negative Negative Negative Negative   NITRITE Negative Negative Negative Negative   LEUKEST Moderate* Large* Large* Small*   RBCU 3* 3* 5* 1   WBCU 10* 29* 15* 2     CT Chest without IV contrast 6/2/2015  Result Impression     1. 8 mm nodule, with adjacent smaller nodules, in the left lower lobe has been stable since outside chest CT 04/21/2014. Recommend follow-up imaging in 6-12 months.   2. Stable bilateral patchy ground glass opacities, likely due to shallow inspiration.   3. Mild right hilar adenopathy, which is  "likely reactive.     FINDINGS:  Stable 8 mm nodule in the superior segment of the left lower lobe; this nodule has been stable since outside chest CT 04/21/2014. The two surrounding smaller nodules are relatively stable in appearance since 12/01/2014 (these nodules were obscured on outside chest CT). No new pulmonary nodules. Stable scattered groundglass opacities in both lungs which may be due to shallow inspiration. Tiny calcified granuloma right lung apex. Trace amount of atelectasis in the lingula. Scattered mediastinal and left hilar lymph nodes are more prominent on today's exam, however remain within normal limits for size. New mild right hilar adenopathy, with largest node measuring approximately 1 cm in maximal diameter.     Electronically signed by:    MAGALIS Vargas -[id number] 02-Jun-2015 10:46        Mario Freeman MD [phone] 02-Jun-2015 10:46       CT lung 2018: Stable lung nodules and 1 mm non-obstructing stone at upper pole of left kidney    ASSESSMENT AND PLAN:  # GPA (dx 2013)  Presented with pulmonary and renal involvement. S/P Rituximab q6 month since 3791-2302 without flare of the disease. As she has been through a quiescent course, we have agreed to space out and/or reduce the dosage of rituximab. Last dose of rituximab was Sept 2018 .   She presents today with a constellation of symptoms suggestive of \"grumbling disease\".  She is a year post the last dose of maintenance rituximab.  Her CD19 B cells are repopulated, and her ANCA level may be increasing slowly.    We discussed again continuing with expectant observation vs proceeding with maintenance rituximab.  We agreed to proceed with a maintenance dose of rituximab at this point - especially in anticipation of her trip to [place] later in the fall.     Mario Fernandes MD  Division of Renal Disease and Hypertension  Pager: [phone]  Date of encounter September 26, 2019 September 28, 2019              Nephrology Clinic    Waleska ADAME Jeremías MRN:[id number] " "YOB: 1950  Date of Service: 06/01/2019  Primary care provider: No Ref-Primary, Physician  Requesting physician: Self-referral    REASON FOR CONSULT: ANCA Vasculitis    HISTORY OF PRESENT ILLNESS:   Waleska Veronica is a 68 year old female with GERD, hypothyroidism, hyperuricemia, Obesity, JOSE ARMANDO not on CPAP and chronic venous insufficiency who presents for evaluation of ANCA vasculitis    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 She was hospitalized x 10 days in [place]. Initially, hemoptysis attributed to pneumonia. She mentioned that her lung was \"messed up enough\" that they could not perform a (transbronchial) lung biopsy. She was put on prednisone and tapered off eventually. While she was at [place], the diagnosis of Wegener's was not confirmed.  March 2013:  Back in [place].  Admitted to [facility] with acute renal failure and scant hemoptysis.  She had significant fatigue, oral ulcers, scant purpura on the forearms ( \"months\" after finishing the course of prednisone).  She did not require dialysis.  Dx of \"Wegener's granulomatosis\"  -> plasmapheresis (x 10+ sessions?) under the care of Dr Lowry (nephrology) .  She received prednisone and was treated with Rituximab once per week for 4 weeks.  April 2013 - 2018  : She was on prednisone for around 4 months. She experienced a lot of side effect including weight gain, mood changes, cushingoid facies.  She denies have hyperglycemia.  She was tapered off prednisone She then received rituximab every 6 months (1000 mg)       Treatment summary  March 2013: Plasmapheresis + Rituximab  Rituximab 1000 mg IV every 6 month since initial treatment in 2013 SEPTEMBER 7, 2018: Last dose of Rituximab 1000 mg. (8months after previous dose)      Interval summary  Her main question at this time pertains to the need for continued maintenance therapy with rituximab.  She " "asks about her risk for relapse.  Since she has been getting rituximab since the onset of the disease, she is not sure about how to move forward. She endorses having joint pain all of the time from multiple kinds of arthritis. She denies nose bleeding/hair loss/mouth sore but would have periodic nose congestion. Overall, she did not have any official flare' of the disease since March 2013. She only has the aforementioned symptoms which she thinks it could be related to cold or other minor illness. Reported feeling good 8/10. The bothersome symptoms were plantar fasciitis s/p steroid injection and back pain. She has not been using CPAP because she was not satisfied with the encounter with sleep providers.    3/2/19  Had a good trip to [place].  Reports no acute illness since the last visit.  Has hot flashes (old)  Dry nose,  No epistaxis.  No oral ulcers.  Cough, with post nasal drip..  Clear but occasional yellow  No F/Chills  Energy:  \" I'm lazy\"  No CP.  No GI symptoms.  No  symptoms.    4/18/19  She feels poorly, in multiple   She is more tired and achy.  \"all joints are hurting\" plantar fasciitis,  Thumbs and knees are hurting (also chronic)  More night sweats over the last 2 weeks.  No F/chills.  NO oral ulcers.  Cough is a little worse from sinus drainage.  More back aches.  One blister on external surface of R arm near elbow, which is now dry and scaly.  She admits to some anxiety because this is the 5th anniversary of her hospitalization.    June 1, 2019  Since last visit she had an URI, bad cough around April 29th.  Flu test was neg.  She eventually got better without antibiotics.  Still has a little cough.  NO F or Chills.  Continues to have chronic diffuse arthralgias.  Was seen by Dr Leger, and was told she had mild crusting.    Continues to have night sweats every night...  Since 1997.  No oral ulcers or epistaxis.  NO  symptoms.      September 26, 2019  Hot flashes and sweats, thinks these are increased " "in frequency, and had a couple of episodes during the daytime.  Cough x 1 week, non productive.  NOT associated with URI symptoms.  Increase in thumb pain, after seafood buffet,  Lasted a day or two, and pain is \"a little more than usual\"  Back pain at night  No oral ulcers or epistaxis.  NO  symptoms.  No GI spms (BM daily)      PAST MEDICAL HISTORY:  Past Medical History:   Diagnosis Date     DVT (deep venous thrombosis) (H)     occured 1 month after starting OCP     GERD (gastroesophageal reflux disease)      Hyperuricemia      Hypothyroid      Obesity (BMI 35.0-39.9 without comorbidity)      Venous insufficiency (chronic) (peripheral)      Wegener's granulomatosis with renal involvement (H)     , No history of complicated pregnancies.  Last pregnancy with twins       PAST SURGICAL HISTORY:  Past Surgical History:   Procedure Laterality Date     HYSTERECTOMY       TUBAL LIGATION            MEDICATIONS:  Prescription Medications as of 2019           Rx Number Disp Refills Start End Last Dispensed Date Next Fill Date Owning Pharmacy    levothyroxine (SYNTHROID/LEVOTHROID) 112 MCG tablet            Sig: Take 112 mcg by mouth    Class: Historical    Route: Oral    LACTOBACILLUS PO            Si tablet    Class: Historical    valACYclovir (VALTREX) 1000 mg tablet    2018        Class: HistoricalNOT TAKING        ALLERGIES:    No Known Allergies     REVIEW OF SYSTEMS:  A comprehensive review of systems was performed and found to be negative except as described here or above.   SOCIAL HISTORY:   Social History     Socioeconomic History     Marital status:      Spouse name: Not on file     Number of children: Not on file     Years of education: Not on file     Highest education level: Not on file   Occupational History     Not on file   Social Needs     Financial resource strain: Not on file     Food insecurity:     Worry: Not on file     Inability: Not on file     " "Transportation needs:     Medical: Not on file     Non-medical: Not on file   Tobacco Use     Smoking status: Never Smoker     Smokeless tobacco: Never Used   Substance and Sexual Activity     Alcohol use: No     Drug use: No     Sexual activity: Not Currently     Partners: Male     Birth control/protection: None   Lifestyle     Physical activity:     Days per week: Not on file     Minutes per session: Not on file     Stress: Not on file   Relationships     Social connections:     Talks on phone: Not on file     Gets together: Not on file     Attends [employer] service: Not on file     Active member of club or organization: Not on file     Attends meetings of clubs or organizations: Not on file     Relationship status: Not on file     Intimate partner violence:     Fear of current or ex partner: Not on file     Emotionally abused: Not on file     Physically abused: Not on file     Forced sexual activity: Not on file   Other Topics Concern     Not on file   Social History Narrative     Not on file     Lives in [place]  Family is in town in [place]     FAMILY MEDICAL HISTORY:   Has 4 children, Daughter 48, Daughter 47, fraternal twin : daughter and son age 46    PHYSICAL EXAM:   /80   Pulse 77   Temp 97.5  F (36.4  C) (Oral)   Ht 1.6 m (5' 3\")   Wt 102.8 kg (226 lb 9.6 oz)   SpO2 96%   BMI 40.14 kg/m     GENERAL APPEARANCE: alert and no distress, joyful  EYES: nonicteric  HENT: mouth without ulcers or lesions, TM without erythema  NECK: supple, no adenopathy  RESP: lungs clear to auscultation   CV: regular rhythm, normal rate, no rub  ABDOMEN: soft, nontender, normal bowel sounds  Extremities: no clubbing, cyanosis, trace pitting edema left>right  MS: no evidence of inflammation in joints, no muscle tenderness  SKIN: no rash  NEURO: mentation intact and speech normal  PSYCH: affect normal/bright   LABS:       Result See Note 4/11/18  Comment:  KELSEY BRISCOE  MRN:    [id number]  Myeloperoxidase " Antibody 0   Comment:  INTERPRETIVE INFORMATION: Myeloperoxidase Abs, IgG    19 AU/mL or Less ......... Negative    20-25 AU/mL .............. Equivocal    26 AU/mL or Greater ...... Positive       Serine Protease 3 Antibody  Test                          Result   Units   Ref. Interval  Serine Protease3, IgG         4        AU/mL   0-19  INTERPRETIVE INFORMATION: Serine Protease 3, IgG  19 AU/mL or Less ........ Negative  20-25 AU/mL ............. Equivocal  26 AU/mL or Greater ..... Positive     No results found for this or any previous visit (from the past 168 hour(s)).  Albumin   Date Value Ref Range Status   08/10/2019 3.2 (L) 3.4 - 5.0 g/dL Final   06/01/2019 3.2 (L) 3.4 - 5.0 g/dL Final   04/18/2019 3.3 (L) 3.4 - 5.0 g/dL Final   03/02/2019 3.2 (L) 3.4 - 5.0 g/dL Final   01/19/2019 3.3 (L) 3.4 - 5.0 g/dL Final         URINE STUDIES  Recent Labs   Lab Test 06/01/19  1105 04/18/19  1745 03/02/19  1106 11/17/18  0756   COLOR Yellow Yellow Yellow Yellow   APPEARANCE Clear Clear Slightly Cloudy Clear   URINEGLC Negative Negative Negative Negative   URINEBILI Negative Negative Negative Negative   URINEKETONE Negative Negative Negative Negative   SG 1.019 1.020 1.020 1.018   UBLD Small* Negative Small* Small*   URINEPH 5.0 6.0 5.0 5.0   PROTEIN Negative Negative Negative Negative   NITRITE Negative Negative Negative Negative   LEUKEST Moderate* Large* Large* Small*   RBCU 3* 3* 5* 1   WBCU 10* 29* 15* 2         CT Chest without IV contrast 6/2/2015  Result Impression     1. 8 mm nodule, with adjacent smaller nodules, in the left lower lobe has been stable since outside chest CT 04/21/2014. Recommend follow-up imaging in 6-12 months.   2. Stable bilateral patchy ground glass opacities, likely due to shallow inspiration.   3. Mild right hilar adenopathy, which is likely reactive.     FINDINGS:  Stable 8 mm nodule in the superior segment of the left lower lobe; this nodule has been stable since outside chest CT  04/21/2014. The two surrounding smaller nodules are relatively stable in appearance since 12/01/2014 (these nodules were obscured on outside chest CT). No new pulmonary nodules. Stable scattered groundglass opacities in both lungs which may be due to shallow inspiration. Tiny calcified granuloma right lung apex. Trace amount of atelectasis in the lingula. Scattered mediastinal and left hilar lymph nodes are more prominent on today's exam, however remain within normal limits for size. New mild right hilar adenopathy, with largest node measuring approximately 1 cm in maximal diameter.     Electronically signed by:    MAGALIS Vargas -[id number] 02-Jun-2015 10:46        Mario Freeman MD [phone] 02-Jun-2015 10:46       CT lung 2018: Stable lung nodules and 1 mm non-obstructing stone at upper pole of left kidney    ASSESSMENT AND PLAN:  # GPA (dx 2013)  Presented with pulmonary and renal involvement. S/P Rituximab q6 month since 6638-2013 without flare of the disease. As she has been through a quiescent course, we have agreed to space out and/or reduce the dosage of rituximab. Last dose of rituximab was Sept 2018 .   She remains in complete remission on therapy, with no signs of active disease.  She continues to have non-specific complaints - many related to chronic arthralgias.  Her B cells are repopulated.  Her ANCA tests are still completely negative.    Although there is a report of mild nasal crusting (not visible anteriorly on today's examination), Ms Veronica does not think it feels like active inflammation.   We discussed again continuing with expectant observation rather than proceeding with maintenance rituximab.  She acknowledges understanding, and acknowledges that many of her symptoms are related to her weight and low stamina to increase exercise.  We discussed options and I encouraged her to consider water aerobics or a GYM class under supervision.  We agreed not to proceed with a dose of rituximab at this  point      Grumbling disease.  Repat ritux 500 mg IV x 1.      Mario Fernandes MD  Division of Renal Disease and Hypertension  Pager: [phone]  September 26, 2019                  Mario Fernandes MD

## 2019-09-26 NOTE — PATIENT INSTRUCTIONS
Preventive Care:    Colorectal Cancer Screening: During our visit today, we discussed that it is recommended you receive colorectal cancer screening. Please call or make an appointment with your primary care provider to discuss this. You may also call the [facility] scheduling line ([phone]) to set up a colonoscopy appointment.

## 2019-09-26 NOTE — NURSING NOTE
"Chief Complaint   Patient presents with     RECHECK     ANCA-associated vasculitis      /80   Pulse 77   Temp 97.5  F (36.4  C) (Oral)   Ht 1.6 m (5' 3\")   Wt 102.8 kg (226 lb 9.6 oz)   SpO2 96%   BMI 40.14 kg/m    Lilian Doyle CMA    "

## 2019-09-26 NOTE — LETTER
Date:September 30, 2019      Patient was self referred, no letter generated. Do not send.        [facility] Health Information

## 2019-09-26 NOTE — PROGRESS NOTES
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 She was hospitalized x 10 days in [place]. Initially, hemoptysis attributed to pneumonia. She mentioned that her lung was \"messed up enough\" that they could not perform a (transbronchial) lung biopsy. She was put on prednisone and tapered off eventually. While she was at [place], the diagnosis of Wegener's was not confirmed.  March 2013:  Back in [place].  Admitted to [facility] with acute renal failure and scant hemoptysis.  She had significant fatigue, oral ulcers, scant purpura on the forearms ( \"months\" after finishing the course of prednisone).  She did not require dialysis.  Dx of \"Wegener's granulomatosis\"  -> plasmapheresis (x 10+ sessions?) under the care of Dr Lowry (nephrology) .  She received prednisone and was treated with Rituximab once per week for 4 weeks.  April 2013 - 2018  : She was on prednisone for around 4 months. She experienced a lot of side effect including weight gain, mood changes, cushingoid facies.  She denies have hyperglycemia.  She was tapered off prednisone She then received rituximab every 6 months (1000 mg)       Treatment summary  March 2013: Plasmapheresis + Rituximab  Rituximab 1000 mg IV every 6 month since initial treatment in 2013 SEPTEMBER 7, 2018: Last dose of Rituximab 1000 mg. (8months after previous dose)      Interval summary  Her main question at this time pertains to the need for " "continued maintenance therapy with rituximab.  She asks about her risk for relapse.  Since she has been getting rituximab since the onset of the disease, she is not sure about how to move forward. She endorses having joint pain all of the time from multiple kinds of arthritis. She denies nose bleeding/hair loss/mouth sore but would have periodic nose congestion. Overall, she did not have any official flare' of the disease since March 2013. She only has the aforementioned symptoms which she thinks it could be related to cold or other minor illness. Reported feeling good 8/10. The bothersome symptoms were plantar fasciitis s/p steroid injection and back pain. She has not been using CPAP because she was not satisfied with the encounter with sleep providers.    3/2/19  Had a good trip to [place].  Reports no acute illness since the last visit.  Has hot flashes (old)  Dry nose,  No epistaxis.  No oral ulcers.  Cough, with post nasal drip..  Clear but occasional yellow  No F/Chills  Energy:  \" I'm lazy\"  No CP.  No GI symptoms.  No  symptoms.    4/18/19  She feels poorly, in multiple   She is more tired and achy.  \"all joints are hurting\" plantar fasciitis,  Thumbs and knees are hurting (also chronic)  More night sweats over the last 2 weeks.  No F/chills.  NO oral ulcers.  Cough is a little worse from sinus drainage.  More back aches.  One blister on external surface of R arm near elbow, which is now dry and scaly.  She admits to some anxiety because this is the 5th anniversary of her hospitalization.    June 1, 2019  Since last visit she had an URI, bad cough around April 29th.  Flu test was neg.  She eventually got better without antibiotics.  Still has a little cough.  NO F or Chills.  Continues to have chronic diffuse arthralgias.  Was seen by Dr Leger, and was told she had mild crusting.    Continues to have night sweats every night...  Since 1997.  No oral ulcers or epistaxis.  NO  symptoms.      September 26, " "  Ms Veronica presents for follow up.  She presents feeling generally close to her baseline, but has a number of complaints.  She reports increased fatigue and arthralgias.    Increased frequency of night sweats, and \"hot flashes\" occurring during daytime.  No overt fevers or chills.  No over rash but has 3 new small macular purpuric spots on extensor surface of R forearm  No L Ext swelling.  No cough or hemoptysis, no oral ulcers.      PAST MEDICAL HISTORY:  Past Medical History:   Diagnosis Date     DVT (deep venous thrombosis) (H)     occured 1 month after starting OCP     GERD (gastroesophageal reflux disease)      Hyperuricemia      Hypothyroid      Obesity (BMI 35.0-39.9 without comorbidity)      Venous insufficiency (chronic) (peripheral)      Wegener's granulomatosis with renal involvement (H)     , No history of complicated pregnancies.  Last pregnancy with twins       PAST SURGICAL HISTORY:  Past Surgical History:   Procedure Laterality Date     HYSTERECTOMY       TUBAL LIGATION            MEDICATIONS:  Prescription Medications as of 2019        Rx Number Disp Refills Start End Last Dispensed Date Next Fill Date Owning Pharmacy    levothyroxine (SYNTHROID/LEVOTHROID) 112 MCG tablet            Sig: Take 112 mcg by mouth    Class: Historical    Route: Oral    LACTOBACILLUS PO            Si tablet    Class: Historical    valACYclovir (VALTREX) 1000 mg tablet    2018        Class: HistoricalNOT TAKING        ALLERGIES:    No Known Allergies     REVIEW OF SYSTEMS:  A comprehensive review of systems was performed and found to be negative except as described here or above.   SOCIAL HISTORY:   Social History     Socioeconomic History     Marital status:      Spouse name: Not on file     Number of children: Not on file     Years of education: Not on file     Highest education level: Not on file   Occupational History     Not on file   Social Needs     Financial resource " "strain: Not on file     Food insecurity:     Worry: Not on file     Inability: Not on file     Transportation needs:     Medical: Not on file     Non-medical: Not on file   Tobacco Use     Smoking status: Never Smoker     Smokeless tobacco: Never Used   Substance and Sexual Activity     Alcohol use: No     Drug use: No     Sexual activity: Not Currently     Partners: Male     Birth control/protection: None   Lifestyle     Physical activity:     Days per week: Not on file     Minutes per session: Not on file     Stress: Not on file   Relationships     Social connections:     Talks on phone: Not on file     Gets together: Not on file     Attends Religion service: Not on file     Active member of club or organization: Not on file     Attends meetings of clubs or organizations: Not on file     Relationship status: Not on file     Intimate partner violence:     Fear of current or ex partner: Not on file     Emotionally abused: Not on file     Physically abused: Not on file     Forced sexual activity: Not on file   Other Topics Concern     Not on file   Social History Narrative     Not on file     Lives in [place]  Family is in [place]     FAMILY MEDICAL HISTORY:   Has 4 children, Daughter 48, Daughter 47, fraternal twin : daughter and son age 46    PHYSICAL EXAM:   /80   Pulse 77   Temp 97.5  F (36.4  C) (Oral)   Ht 1.6 m (5' 3\")   Wt 102.8 kg (226 lb 9.6 oz)   SpO2 96%   BMI 40.14 kg/m    GENERAL APPEARANCE: alert and no distress, joyful  EYES: nonicteric  HENT: mouth without ulcers or lesions, TM without erythema  NECK: supple, no adenopathy  RESP: lungs clear to auscultation   CV: regular rhythm, normal rate, no rub  ABDOMEN: soft, nontender, normal bowel sounds  Extremities: no clubbing, cyanosis, trace pitting edema left>right  MS: no evidence of inflammation in joints, no muscle tenderness  SKIN: no rash  NEURO: mentation intact and speech normal  PSYCH: affect normal/bright   LABS:     Result " See Note 4/11/18  Comment:  KELSEY BRISCOE  MRN:    [id number]  Myeloperoxidase Antibody 0   Comment:  INTERPRETIVE INFORMATION: Myeloperoxidase Abs, IgG    19 AU/mL or Less ......... Negative    20-25 AU/mL .............. Equivocal    26 AU/mL or Greater ...... Positive       Serine Protease 3 Antibody  Test                          Result   Units   Ref. Interval  Serine Protease3, IgG         4        AU/mL   0-19  INTERPRETIVE INFORMATION: Serine Protease 3, IgG  19 AU/mL or Less ........ Negative  20-25 AU/mL ............. Equivocal  26 AU/mL or Greater ..... Positive     No results found for this or any previous visit (from the past 168 hour(s)).  Albumin   Date Value Ref Range Status   08/10/2019 3.2 (L) 3.4 - 5.0 g/dL Final   06/01/2019 3.2 (L) 3.4 - 5.0 g/dL Final   04/18/2019 3.3 (L) 3.4 - 5.0 g/dL Final   03/02/2019 3.2 (L) 3.4 - 5.0 g/dL Final   01/19/2019 3.3 (L) 3.4 - 5.0 g/dL Final         URINE STUDIES  Recent Labs   Lab Test 06/01/19  1105 04/18/19  1745 03/02/19  1106 11/17/18  0756   COLOR Yellow Yellow Yellow Yellow   APPEARANCE Clear Clear Slightly Cloudy Clear   URINEGLC Negative Negative Negative Negative   URINEBILI Negative Negative Negative Negative   URINEKETONE Negative Negative Negative Negative   SG 1.019 1.020 1.020 1.018   UBLD Small* Negative Small* Small*   URINEPH 5.0 6.0 5.0 5.0   PROTEIN Negative Negative Negative Negative   NITRITE Negative Negative Negative Negative   LEUKEST Moderate* Large* Large* Small*   RBCU 3* 3* 5* 1   WBCU 10* 29* 15* 2     CT Chest without IV contrast 6/2/2015  Result Impression     1. 8 mm nodule, with adjacent smaller nodules, in the left lower lobe has been stable since outside chest CT 04/21/2014. Recommend follow-up imaging in 6-12 months.   2. Stable bilateral patchy ground glass opacities, likely due to shallow inspiration.   3. Mild right hilar adenopathy, which is likely reactive.     FINDINGS:  Stable 8 mm nodule in the superior segment of the  "left lower lobe; this nodule has been stable since outside chest CT 04/21/2014. The two surrounding smaller nodules are relatively stable in appearance since 12/01/2014 (these nodules were obscured on outside chest CT). No new pulmonary nodules. Stable scattered groundglass opacities in both lungs which may be due to shallow inspiration. Tiny calcified granuloma right lung apex. Trace amount of atelectasis in the lingula. Scattered mediastinal and left hilar lymph nodes are more prominent on today's exam, however remain within normal limits for size. New mild right hilar adenopathy, with largest node measuring approximately 1 cm in maximal diameter.     Electronically signed by:    MAGALIS Vargas -[id number] 02-Jun-2015 10:46        Mario Freeman MD [phone] 02-Jun-2015 10:46       CT lung 2018: Stable lung nodules and 1 mm non-obstructing stone at upper pole of left kidney    ASSESSMENT AND PLAN:  # GPA (dx 2013)  Presented with pulmonary and renal involvement. S/P Rituximab q6 month since [id number] without flare of the disease. As she has been through a quiescent course, we have agreed to space out and/or reduce the dosage of rituximab. Last dose of rituximab was Sept 2018 .   She presents today with a constellation of symptoms suggestive of \"grumbling disease\".  She is a year post the last dose of maintenance rituximab.  Her CD19 B cells are repopulated, and her ANCA level may be increasing slowly.    We discussed again continuing with expectant observation vs proceeding with maintenance rituximab.  We agreed to proceed with a maintenance dose of rituximab at this point - especially in anticipation of her trip to [place] later in the fall.     Mario Fernandes MD  Division of Renal Disease and Hypertension  Pager: [phone]  Date of encounter September 26, 2019 September 28, 2019              Nephrology Clinic    Waleska Veronica MRN:[id number] YOB: 1950  Date of Service: 06/01/2019  Primary care provider: No " "Ref-Primary, Physician  Requesting physician: Self-referral    REASON FOR CONSULT: ANCA Vasculitis    HISTORY OF PRESENT ILLNESS:   Waleska Veronica is a 68 year old female with GERD, hypothyroidism, hyperuricemia, Obesity, JOSE ARMANDO not on CPAP and chronic venous insufficiency who presents for evaluation of ANCA vasculitis    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 She was hospitalized x 10 days in [place]. Initially, hemoptysis attributed to pneumonia. She mentioned that her lung was \"messed up enough\" that they could not perform a (transbronchial) lung biopsy. She was put on prednisone and tapered off eventually. While she was at [place], the diagnosis of Wegener's was not confirmed.  March 2013:  Back in [place].  Admitted to [facility] with acute renal failure and scant hemoptysis.  She had significant fatigue, oral ulcers, scant purpura on the forearms ( \"months\" after finishing the course of prednisone).  She did not require dialysis.  Dx of \"Wegener's granulomatosis\"  -> plasmapheresis (x 10+ sessions?) under the care of Dr Lowry (nephrology) .  She received prednisone and was treated with Rituximab once per week for 4 weeks.  April 2013 - 2018  : She was on prednisone for around 4 months. She experienced a lot of side effect including weight gain, mood changes, cushingoid facies.  She denies have hyperglycemia.  She was tapered off prednisone She then received rituximab every 6 months (1000 mg)       Treatment summary  March 2013: Plasmapheresis + Rituximab  Rituximab 1000 mg IV every 6 month since initial treatment in 2013 SEPTEMBER 7, 2018: Last dose of Rituximab 1000 mg. (8months after previous dose)      Interval summary  Her main question at this time pertains to the need for continued maintenance therapy with rituximab.  She asks about her risk for relapse.  Since she has been getting rituximab since the " "onset of the disease, she is not sure about how to move forward. She endorses having joint pain all of the time from multiple kinds of arthritis. She denies nose bleeding/hair loss/mouth sore but would have periodic nose congestion. Overall, she did not have any official flare' of the disease since March 2013. She only has the aforementioned symptoms which she thinks it could be related to cold or other minor illness. Reported feeling good 8/10. The bothersome symptoms were plantar fasciitis s/p steroid injection and back pain. She has not been using CPAP because she was not satisfied with the encounter with sleep providers.    3/2/19  Had a good trip to [place].  Reports no acute illness since the last visit.  Has hot flashes (old)  Dry nose,  No epistaxis.  No oral ulcers.  Cough, with post nasal drip..  Clear but occasional yellow  No F/Chills  Energy:  \" I'm lazy\"  No CP.  No GI symptoms.  No  symptoms.    4/18/19  She feels poorly, in multiple   She is more tired and achy.  \"all joints are hurting\" plantar fasciitis,  Thumbs and knees are hurting (also chronic)  More night sweats over the last 2 weeks.  No F/chills.  NO oral ulcers.  Cough is a little worse from sinus drainage.  More back aches.  One blister on external surface of R arm near elbow, which is now dry and scaly.  She admits to some anxiety because this is the 5th anniversary of her hospitalization.    June 1, 2019  Since last visit she had an URI, bad cough around April 29th.  Flu test was neg.  She eventually got better without antibiotics.  Still has a little cough.  NO F or Chills.  Continues to have chronic diffuse arthralgias.  Was seen by Dr Leger, and was told she had mild crusting.    Continues to have night sweats every night...  Since 1997.  No oral ulcers or epistaxis.  NO  symptoms.      September 26, 2019  Hot flashes and sweats, thinks these are increased in frequency, and had a couple of episodes during the daytime.  Cough x 1 week, " "non productive.  NOT associated with URI symptoms.  Increase in thumb pain, after seafood buffet,  Lasted a day or two, and pain is \"a little more than usual\"  Back pain at night  No oral ulcers or epistaxis.  NO  symptoms.  No GI spms (BM daily)      PAST MEDICAL HISTORY:  Past Medical History:   Diagnosis Date     DVT (deep venous thrombosis) (H)     occured 1 month after starting OCP     GERD (gastroesophageal reflux disease)      Hyperuricemia      Hypothyroid      Obesity (BMI 35.0-39.9 without comorbidity)      Venous insufficiency (chronic) (peripheral)      Wegener's granulomatosis with renal involvement (H)     , No history of complicated pregnancies.  Last pregnancy with twins       PAST SURGICAL HISTORY:  Past Surgical History:   Procedure Laterality Date     HYSTERECTOMY       TUBAL LIGATION            MEDICATIONS:  Prescription Medications as of 2019           Rx Number Disp Refills Start End Last Dispensed Date Next Fill Date Owning Pharmacy    levothyroxine (SYNTHROID/LEVOTHROID) 112 MCG tablet            Sig: Take 112 mcg by mouth    Class: Historical    Route: Oral    LACTOBACILLUS PO            Si tablet    Class: Historical    valACYclovir (VALTREX) 1000 mg tablet    2018        Class: HistoricalNOT TAKING        ALLERGIES:    No Known Allergies     REVIEW OF SYSTEMS:  A comprehensive review of systems was performed and found to be negative except as described here or above.   SOCIAL HISTORY:   Social History     Socioeconomic History     Marital status:      Spouse name: Not on file     Number of children: Not on file     Years of education: Not on file     Highest education level: Not on file   Occupational History     Not on file   Social Needs     Financial resource strain: Not on file     Food insecurity:     Worry: Not on file     Inability: Not on file     Transportation needs:     Medical: Not on file     Non-medical: Not on file   Tobacco Use " "    Smoking status: Never Smoker     Smokeless tobacco: Never Used   Substance and Sexual Activity     Alcohol use: No     Drug use: No     Sexual activity: Not Currently     Partners: Male     Birth control/protection: None   Lifestyle     Physical activity:     Days per week: Not on file     Minutes per session: Not on file     Stress: Not on file   Relationships     Social connections:     Talks on phone: Not on file     Gets together: Not on file     Attends [employer] service: Not on file     Active member of club or organization: Not on file     Attends meetings of clubs or organizations: Not on file     Relationship status: Not on file     Intimate partner violence:     Fear of current or ex partner: Not on file     Emotionally abused: Not on file     Physically abused: Not on file     Forced sexual activity: Not on file   Other Topics Concern     Not on file   Social History Narrative     Not on file     Lives in [place]  Family is in town in [place]     FAMILY MEDICAL HISTORY:   Has 4 children, Daughter 48, Daughter 47, fraternal twin : daughter and son age 46    PHYSICAL EXAM:   /80   Pulse 77   Temp 97.5  F (36.4  C) (Oral)   Ht 1.6 m (5' 3\")   Wt 102.8 kg (226 lb 9.6 oz)   SpO2 96%   BMI 40.14 kg/m    GENERAL APPEARANCE: alert and no distress, joyful  EYES: nonicteric  HENT: mouth without ulcers or lesions, TM without erythema  NECK: supple, no adenopathy  RESP: lungs clear to auscultation   CV: regular rhythm, normal rate, no rub  ABDOMEN: soft, nontender, normal bowel sounds  Extremities: no clubbing, cyanosis, trace pitting edema left>right  MS: no evidence of inflammation in joints, no muscle tenderness  SKIN: no rash  NEURO: mentation intact and speech normal  PSYCH: affect normal/bright   LABS:       Result See Note 4/11/18  Comment:  KELSEY BRISCOE  MRN:    [id number]  Myeloperoxidase Antibody 0   Comment:  INTERPRETIVE INFORMATION: Myeloperoxidase Abs, IgG    19 AU/mL or Less " ......... Negative    20-25 AU/mL .............. Equivocal    26 AU/mL or Greater ...... Positive       Serine Protease 3 Antibody  Test                          Result   Units   Ref. Interval  Serine Protease3, IgG         4        AU/mL   0-19  INTERPRETIVE INFORMATION: Serine Protease 3, IgG  19 AU/mL or Less ........ Negative  20-25 AU/mL ............. Equivocal  26 AU/mL or Greater ..... Positive     No results found for this or any previous visit (from the past 168 hour(s)).  Albumin   Date Value Ref Range Status   08/10/2019 3.2 (L) 3.4 - 5.0 g/dL Final   06/01/2019 3.2 (L) 3.4 - 5.0 g/dL Final   04/18/2019 3.3 (L) 3.4 - 5.0 g/dL Final   03/02/2019 3.2 (L) 3.4 - 5.0 g/dL Final   01/19/2019 3.3 (L) 3.4 - 5.0 g/dL Final         URINE STUDIES  Recent Labs   Lab Test 06/01/19  1105 04/18/19  1745 03/02/19  1106 11/17/18  0756   COLOR Yellow Yellow Yellow Yellow   APPEARANCE Clear Clear Slightly Cloudy Clear   URINEGLC Negative Negative Negative Negative   URINEBILI Negative Negative Negative Negative   URINEKETONE Negative Negative Negative Negative   SG 1.019 1.020 1.020 1.018   UBLD Small* Negative Small* Small*   URINEPH 5.0 6.0 5.0 5.0   PROTEIN Negative Negative Negative Negative   NITRITE Negative Negative Negative Negative   LEUKEST Moderate* Large* Large* Small*   RBCU 3* 3* 5* 1   WBCU 10* 29* 15* 2         CT Chest without IV contrast 6/2/2015  Result Impression     1. 8 mm nodule, with adjacent smaller nodules, in the left lower lobe has been stable since outside chest CT 04/21/2014. Recommend follow-up imaging in 6-12 months.   2. Stable bilateral patchy ground glass opacities, likely due to shallow inspiration.   3. Mild right hilar adenopathy, which is likely reactive.     FINDINGS:  Stable 8 mm nodule in the superior segment of the left lower lobe; this nodule has been stable since outside chest CT 04/21/2014. The two surrounding smaller nodules are relatively stable in appearance since 12/01/2014  (these nodules were obscured on outside chest CT). No new pulmonary nodules. Stable scattered groundglass opacities in both lungs which may be due to shallow inspiration. Tiny calcified granuloma right lung apex. Trace amount of atelectasis in the lingula. Scattered mediastinal and left hilar lymph nodes are more prominent on today's exam, however remain within normal limits for size. New mild right hilar adenopathy, with largest node measuring approximately 1 cm in maximal diameter.     Electronically signed by:    MAGALIS Vargas -[id number] 02-Jun-2015 10:46        Mario Freeman MD [phone] 02-Jun-2015 10:46       CT lung 2018: Stable lung nodules and 1 mm non-obstructing stone at upper pole of left kidney    ASSESSMENT AND PLAN:  # GPA (dx 2013)  Presented with pulmonary and renal involvement. S/P Rituximab q6 month since [id number] without flare of the disease. As she has been through a quiescent course, we have agreed to space out and/or reduce the dosage of rituximab. Last dose of rituximab was Sept 2018 .   She remains in complete remission on therapy, with no signs of active disease.  She continues to have non-specific complaints - many related to chronic arthralgias.  Her B cells are repopulated.  Her ANCA tests are still completely negative.    Although there is a report of mild nasal crusting (not visible anteriorly on today's examination), Ms Veronica does not think it feels like active inflammation.   We discussed again continuing with expectant observation rather than proceeding with maintenance rituximab.  She acknowledges understanding, and acknowledges that many of her symptoms are related to her weight and low stamina to increase exercise.  We discussed options and I encouraged her to consider water aerobics or a GYM class under supervision.  We agreed not to proceed with a dose of rituximab at this point      Grumbling disease.  Repat ritux 500 mg IV x 1.      Mario Fernandes MD  Division of Renal Disease  and Hypertension  Pager: [phone]  September 26, 2019

## 2019-09-27 LAB
MYELOPEROXIDASE AB SER-ACNC: <0.2 AI (ref 0–0.9)
PROTEINASE3 IGG SER-ACNC: 0.6 AI (ref 0–0.9)

## 2019-10-02 ENCOUNTER — TELEPHONE (OUTPATIENT)
Dept: RHEUMATOLOGY | Facility: CLINIC | Age: 69
End: 2019-10-02

## 2019-10-02 NOTE — TELEPHONE ENCOUNTER
Pt [place] have infusion done [place] [facility].  Pt could be scheduled 10/8 at 1230 at [facility]. Called and updated patient with date and time. No further questions.    Ailyn Gonzalez RN  Rheumatology Clinic

## 2019-10-08 ENCOUNTER — INFUSION THERAPY VISIT (OUTPATIENT)
Dept: INFUSION THERAPY | Facility: CLINIC | Age: 69
End: 2019-10-08
Payer: MEDICARE

## 2019-10-08 VITALS
SYSTOLIC BLOOD PRESSURE: 130 MMHG | WEIGHT: 227.1 LBS | BODY MASS INDEX: 40.23 KG/M2 | TEMPERATURE: 97.3 F | DIASTOLIC BLOOD PRESSURE: 80 MMHG | RESPIRATION RATE: 16 BRPM | HEART RATE: 65 BPM | OXYGEN SATURATION: 96 %

## 2019-10-08 DIAGNOSIS — I77.6 VASCULITIS (H): Primary | ICD-10-CM

## 2019-10-08 PROCEDURE — 96375 TX/PRO/DX INJ NEW DRUG ADDON: CPT | Performed by: NURSE PRACTITIONER

## 2019-10-08 PROCEDURE — 99207 ZZC NO CHARGE LOS: CPT

## 2019-10-08 PROCEDURE — 96415 CHEMO IV INFUSION ADDL HR: CPT | Performed by: NURSE PRACTITIONER

## 2019-10-08 PROCEDURE — 96413 CHEMO IV INFUSION 1 HR: CPT | Performed by: NURSE PRACTITIONER

## 2019-10-08 RX ORDER — METHYLPREDNISOLONE SODIUM SUCCINATE 40 MG/ML
80 INJECTION, POWDER, LYOPHILIZED, FOR SOLUTION INTRAMUSCULAR; INTRAVENOUS ONCE
Status: COMPLETED | OUTPATIENT
Start: 2019-10-08 | End: 2019-10-08

## 2019-10-08 RX ORDER — METHYLPREDNISOLONE SODIUM SUCCINATE 125 MG/2ML
80 INJECTION, POWDER, LYOPHILIZED, FOR SOLUTION INTRAMUSCULAR; INTRAVENOUS ONCE
Status: CANCELLED | OUTPATIENT
Start: 2019-10-08

## 2019-10-08 RX ORDER — DIPHENHYDRAMINE HCL 25 MG
50 CAPSULE ORAL ONCE
Status: CANCELLED
Start: 2019-10-08

## 2019-10-08 RX ORDER — DIPHENHYDRAMINE HCL 25 MG
50 CAPSULE ORAL ONCE
Status: COMPLETED | OUTPATIENT
Start: 2019-10-08 | End: 2019-10-08

## 2019-10-08 RX ORDER — ACETAMINOPHEN 325 MG/1
650 TABLET ORAL ONCE
Status: COMPLETED | OUTPATIENT
Start: 2019-10-08 | End: 2019-10-08

## 2019-10-08 RX ORDER — ACETAMINOPHEN 325 MG/1
650 TABLET ORAL ONCE
Status: CANCELLED
Start: 2019-10-08

## 2019-10-08 RX ADMIN — METHYLPREDNISOLONE SODIUM SUCCINATE 80 MG: 40 INJECTION INTRAMUSCULAR; INTRAVENOUS at 13:21

## 2019-10-08 RX ADMIN — Medication 250 ML: at 13:19

## 2019-10-08 RX ADMIN — ACETAMINOPHEN 650 MG: 325 TABLET ORAL at 13:12

## 2019-10-08 RX ADMIN — Medication 50 MG: at 13:12

## 2019-10-08 ASSESSMENT — PAIN SCALES - GENERAL: PAINLEVEL: MILD PAIN (2)

## 2019-10-08 NOTE — PROGRESS NOTES
Infusion Nursing Note:  Waleska ADAME Jeremías presents today for Rituxan D1.    Patient seen by provider today: No   present during visit today: Not Applicable.    Note: tolerates Rituxan without side effects.    Intravenous Access:  Peripheral IV placed.    Treatment Conditions:  Biological Infusion Checklist:  ~~~ NOTE: If the patient answers yes to any of the questions below, hold the infusion and contact ordering provider or on-call provider.    1. Have you recently had an elevated temperature, fever, chills, productive cough, coughing for 3 weeks or longer or hemoptysis, abnormal vital signs, night sweats,  chest pain or have you noticed a decrease in your appetite, unexplained weight loss or fatigue? No  2. Do you have any open wounds or new incisions? No  3. Do you have any recent or upcoming hospitalizations, surgeries or dental procedures? No  4. Do you currently have or recently have had any signs of illness or infection or are you on any antibiotics? No  5. Have you had any new, sudden or worsening abdominal pain? No  6. Have you or anyone in your household received a live vaccination in the past 4 weeks? Please note:  No live vaccines while on biologic/chemotherapy until 6 months after the last treatment.  Patient can receive the flu vaccine (shot only) and the pneumovax.  It is optimal for the patient to get these vaccines mid cycle, but they can be given at any time as long as it is not on the day of the infusion. No  7. Have you recently been diagnosed with any new nervous system diseases (ie. Multiple sclerosis, Guillain Anasco, seizures, neurological changes) or cancer diagnosis? No  8. Are you on any form of radiation or chemotherapy? No  9. Are you pregnant or breast feeding or do you have plans of pregnancy in the future? No  10. Have you been having any signs of worsening depression or suicidal ideations?  (benlysta only) No  11. Have there been any other new onset medical symptoms?  No      Post Infusion Assessment:  Patient tolerated infusion without incident.       Discharge Plan:   Patient will get her 6 month dose in [place].    DACIA ARNOLD RN, RN

## 2019-12-27 ENCOUNTER — TELEPHONE (OUTPATIENT)
Dept: NEPHROLOGY | Facility: CLINIC | Age: 69
End: 2019-12-27

## 2019-12-27 NOTE — TELEPHONE ENCOUNTER
[facility] Call Center    Phone Message    May a detailed message be left on voicemail: no    Reason for Call: Other: Jazmín from lab core calling with questions on lab orders from Dr. Fernandes, please return call to discuss thank you.      Action Taken: Message routed to:  [facility] neph

## 2020-03-11 ENCOUNTER — HEALTH MAINTENANCE LETTER (OUTPATIENT)
Age: 70
End: 2020-03-11

## 2020-03-16 ENCOUNTER — TELEPHONE (OUTPATIENT)
Dept: NEPHROLOGY | Facility: CLINIC | Age: 70
End: 2020-03-16

## 2020-03-16 DIAGNOSIS — I77.6 VASCULITIS (H): Primary | ICD-10-CM

## 2020-03-16 NOTE — TELEPHONE ENCOUNTER
[facility] Call Center    Phone Message    May a detailed message be left on voicemail: yes     Reason for Call: Other:      Pt is calling in asking Dr Fernandes to put in orders for her lab work that she does every 3 months.       Please call Waleska back with update so she knows when she can go in.         Action Taken: Message routed to:  [facility] Nephrology    Travel Screening: Not Applicable

## 2020-03-19 NOTE — TELEPHONE ENCOUNTER
Called and spoke with pt, she went back to [place] yesterday. She was only in town for 2 days, which was not noted. Pt will attempt to get us lab results.  Discussed needing lab results from December and then will also need lab results from labs that will be drawn this month. Pt has been given a telephone visit in April, will verify with Dr. Fernandes that she is eligible for a telephone visit, pt understands.    Pt is not sure when she will be coming back to [place] as her  did have a stroke a couple of weeks ago and is isolation at the nursing home.  She is in [place] for a while now and is not sure when she will be back.      Pt has been sent a my chart with the number to the Vasculitis program and will send me a message back with the number the lab in [facility] where she can get labs done there.  She will get labs done before appt on 4/9.  Pt is trying to establish with a Vasculitis program locally.    Ailyn Gonzalez RN  Rheumatology Clinic

## 2020-03-23 NOTE — TELEPHONE ENCOUNTER
See my chart encounter from 3/19/2020 for resolution of this matter.    Ailyn Gonzalez RN  Rheumatology Clinic

## 2020-03-24 LAB
ABSOLUTE IMMATURE GRANULOCYTES (EXTERNAL): 0.03 K/UL (ref 0–0.1)
ALBUMIN SERPL-MCNC: 4 G/DL (ref 3.4–4.9)
ANION GAP SERPL CALCULATED.3IONS-SCNC: NORMAL MMOL/L
BASOPHILS # BLD AUTO: 0.09 K/UL (ref 0–0.2)
BASOPHILS NFR BLD AUTO: 0.9 %
BUN SERPL-MCNC: 17 MG/DL (ref 8–25)
CALCIUM SERPL-MCNC: 9.5 MG/DL (ref 8.7–10.4)
CHLORIDE SERPLBLD-SCNC: 103 MMOL/L (ref 95–108)
CO2 SERPL-SCNC: 25 MMOL/L (ref 19–31)
CREAT SERPL-MCNC: 0.85 MG/DL (ref 0.6–1.4)
EOSINOPHIL # BLD AUTO: 0.29 K/UL (ref 0–0.7)
EOSINOPHIL NFR BLD AUTO: 2.9 %
ERYTHROCYTE [DISTWIDTH] IN BLOOD BY AUTOMATED COUNT: 13.2 % (ref 11–15)
GLUCOSE SERPL-MCNC: 95 MG/DL (ref 70–99)
HCT VFR BLD AUTO: 44.4 % (ref 35–48)
HEMOGLOBIN: 14.2 G/DL (ref 11.5–16)
IMMATURE GRANULOCYTES: 0.3 %
LYMPHOCYTES # BLD AUTO: 1.71 K/UL (ref 0.6–5.5)
LYMPHOCYTES NFR BLD AUTO: 17.2 %
Lab: 0.3
MCH RBC QN AUTO: 29.6 PG (ref 27–34)
MCHC RBC AUTO-ENTMCNC: 32 G/DL (ref 31–37)
MCV RBC AUTO: 92.5 FL (ref 78–100)
MONOCYTES # BLD AUTO: 0.6 K/UL (ref 0.1–1.6)
MONOCYTES NFR BLD AUTO: 6 %
MYELOPEROXIDASE: <0.2
NEUTROPHILS # BLD AUTO: 7.21 K/UL (ref 1.6–9.3)
NEUTROPHILS NFR BLD AUTO: 72.7 %
PHOSPHATE SERPL-MCNC: 3.7 MG/DL (ref 2.4–4.8)
PLATELET COUNT - QUEST: 364 10^9/L (ref 130–450)
POTASSIUM SERPL-SCNC: 5 MMOL/L (ref 3.6–5.3)
RBC # BLD AUTO: 4.8 10^12/L (ref 3.7–5.4)
SODIUM SERPL-SCNC: 140 MMOL/L (ref 134–147)
WBC # BLD AUTO: 9.9 10^9/L (ref 4–11)

## 2020-04-08 ENCOUNTER — VIRTUAL VISIT (OUTPATIENT)
Dept: NEPHROLOGY | Facility: CLINIC | Age: 70
End: 2020-04-08
Attending: INTERNAL MEDICINE
Payer: MEDICARE

## 2020-04-08 DIAGNOSIS — I77.6 VASCULITIS (H): Primary | ICD-10-CM

## 2020-04-08 NOTE — PROGRESS NOTES
"Nephrology Clinic  April 8, 2020    Waleska Veronica MRN:[id number] YOB: 1950  Date of Service:April 8, 2020    Primary care provider: No Ref-Primary, Physician  Requesting physician: Self-referral    REASON FOR VISIT: ANCA Vasculitis  Waleska Veronica is a 69 year old female who is being evaluated via a billable telephone visit.      The patient has been notified of following:     \"This telephone visit will be conducted via a call between you and your physician/provider. We have found that certain health care needs can be provided without the need for a physical exam.  This service lets us provide the care you need with a short phone conversation.  If a prescription is necessary we can send it directly to your pharmacy.  If lab work is needed we can place an order for that and you can then stop by our lab to have the test done at a later time.    Telephone visits are billed at different rates depending on your insurance coverage. During this emergency period, for some insurers they may be billed the same as an in-person visit.  Please reach out to your insurance provider with any questions.    If during the course of the call the physician/provider feels a telephone visit is not appropriate, you will not be charged for this service.\"    Patient has given verbal consent for Telephone visit?  Yes      I have reviewed and updated the patient's Past Medical History, Social History, Family History and Medication List.      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 She was hospitalized x 10 days in [place]. Initially, hemoptysis attributed to pneumonia. She mentioned that her lung was \"messed up " "enough\" that they could not perform a (transbronchial) lung biopsy. She was put on prednisone and tapered off eventually. While she was at [place], the diagnosis of Wegener's was not confirmed.  March 2013:  Back in [place].  Admitted to [facility] with acute renal failure and scant hemoptysis.  She had significant fatigue, oral ulcers, scant purpura on the forearms ( \"months\" after finishing the course of prednisone).  She did not require dialysis.  Dx of \"Wegener's granulomatosis\"  -> plasmapheresis (x 10+ sessions?) under the care of Dr Lowry (nephrology) .  She received prednisone and was treated with Rituximab once per week for 4 weeks.  April 2013 - 2018  : She was on prednisone for around 4 months. She experienced a lot of side effect including weight gain, mood changes, cushingoid facies.  She denies have hyperglycemia.  She was tapered off prednisone She then received rituximab every 6 months (1000 mg)       Treatment summary  March 2013: Plasmapheresis + Rituximab  Rituximab 1000 mg IV every 6 month since initial treatment in 2013 SEPTEMBER 7, 2018: Last dose of Rituximab 1000 mg. (8months after previous dose)    October 2019:  Rituximab 500 mg maintenance dose    Interval summary  Her main question at this time pertains to the need for continued maintenance therapy with rituximab.  She asks about her risk for relapse.  Since she has been getting rituximab since the onset of the disease, she is not sure about how to move forward. She endorses having joint pain all of the time from multiple kinds of arthritis. She denies nose bleeding/hair loss/mouth sore but would have periodic nose congestion. Overall, she did not have any official flare' of the disease since March 2013. She only has the aforementioned symptoms which she thinks it could be related to cold or other minor illness. Reported feeling good 8/10. The bothersome symptoms were plantar fasciitis s/p steroid injection and back pain. She has not been " "using CPAP because she was not satisfied with the encounter with sleep providers.    3/2/19  Had a good trip to [place].  Reports no acute illness since the last visit.  Has hot flashes (old)  Dry nose,  No epistaxis.  No oral ulcers.  Cough, with post nasal drip..  Clear but occasional yellow  No F/Chills  Energy:  \" I'm lazy\"  No CP.  No GI symptoms.  No  symptoms.    4/18/19  She feels poorly, in multiple   She is more tired and achy.  \"all joints are hurting\" plantar fasciitis,  Thumbs and knees are hurting (also chronic)  More night sweats over the last 2 weeks.  No F/chills.  NO oral ulcers.  Cough is a little worse from sinus drainage.  More back aches.  One blister on external surface of R arm near elbow, which is now dry and scaly.  She admits to some anxiety because this is the 5th anniversary of her hospitalization.    June 1, 2019  Since last visit she had an URI, bad cough around April 29th.  Flu test was neg.  She eventually got better without antibiotics.  Still has a little cough.  NO F or Chills.  Continues to have chronic diffuse arthralgias.  Was seen by Dr Leger, and was told she had mild crusting.    Continues to have night sweats every night...  Since 1997.  No oral ulcers or epistaxis.  NO  symptoms.      September 26, 2019  Ms Veronica presents for follow up.  She presents feeling generally close to her baseline, but has a number of complaints.  She reports increased fatigue and arthralgias.    Increased frequency of night sweats, and \"hot flashes\" occurring during daytime.  No overt fevers or chills.  No over rash but has 3 new small macular purpuric spots on extensor surface of R forearm  No L Ext swelling.  No cough or hemoptysis, no oral ulcers.      April 8, 2020  Has been in [place] since October 2019.  REceived maintenance dose of Rituximab prior to departure  Has not been able to return to [place] bec of COVID19 pandemic  Her  suffered a CVA with L sided weekness.  She is his full time care " taker.  She denies any acute illness, and feels generally well.   She denies F/Chills/Cough/SOB  Denies dysuria or gross hematuria  No L Ext swelling  No rash.    She checks her BP at home.  Most recent one was 130/78    Had surveillance blood work in Dec 2019 and late 2020 .  Results were reviewed with Ms Veronica over the phone.  ANCA were negative at both dates  Hgb 13.8 and 14.2  Plt 344 and 384  Cr 0.89 and 0.85  Chol 187  HDL 40  TSH 5.8       PAST MEDICAL HISTORY:  Past Medical History:   Diagnosis Date     DVT (deep venous thrombosis) (H)     occured 1 month after starting OCP     GERD (gastroesophageal reflux disease)      Hyperuricemia      Hypothyroid      Obesity (BMI 35.0-39.9 without comorbidity)      Venous insufficiency (chronic) (peripheral)      Wegener's granulomatosis with renal involvement (H)     , No history of complicated pregnancies.  Last pregnancy with twins       PAST SURGICAL HISTORY:  Past Surgical History:   Procedure Laterality Date     HYSTERECTOMY       TUBAL LIGATION            MEDICATIONS:  Prescription Medications as of 2019        Rx Number Disp Refills Start End Last Dispensed Date Next Fill Date Owning Pharmacy    levothyroxine (SYNTHROID/LEVOTHROID) 112 MCG tablet            Sig: Take 112 mcg by mouth    Class: Historical    Route: Oral    LACTOBACILLUS PO            Si tablet    Class: Historical    valACYclovir (VALTREX) 1000 mg tablet    2018        Class: HistoricalNOT TAKING        ALLERGIES:    No Known Allergies     REVIEW OF SYSTEMS:  A comprehensive review of systems was performed and found to be negative except as described here or above.       Lives in [place]  Family is in [place]     FAMILY MEDICAL HISTORY:   Has 4 children, Daughter 48, Daughter 47, fraternal twin : daughter and son age 46    PHYSICAL EXAM:   There were no vitals taken for this visit.     LABS:     Result See Note 18  Comment:  KELSEY VERONICA  A  MRN:    [id number]  Myeloperoxidase Antibody 0   Comment:  INTERPRETIVE INFORMATION: Myeloperoxidase Abs, IgG    19 AU/mL or Less ......... Negative    20-25 AU/mL .............. Equivocal    26 AU/mL or Greater ...... Positive       Serine Protease 3 Antibody  Test                          Result   Units   Ref. Interval  Serine Protease3, IgG         4        AU/mL   0-19  INTERPRETIVE INFORMATION: Serine Protease 3, IgG  19 AU/mL or Less ........ Negative  20-25 AU/mL ............. Equivocal  26 AU/mL or Greater ..... Positive     No results found for this or any previous visit (from the past 168 hour(s)).  Albumin   Date Value Ref Range Status   09/26/2019 3.4 3.4 - 5.0 g/dL Final   08/10/2019 3.2 (L) 3.4 - 5.0 g/dL Final   06/01/2019 3.2 (L) 3.4 - 5.0 g/dL Final   04/18/2019 3.3 (L) 3.4 - 5.0 g/dL Final   03/02/2019 3.2 (L) 3.4 - 5.0 g/dL Final   01/19/2019 3.3 (L) 3.4 - 5.0 g/dL Final         URINE STUDIES  Recent Labs   Lab Test 09/26/19  1617 06/01/19  1105 04/18/19  1745 03/02/19  1106   COLOR Yellow Yellow Yellow Yellow   APPEARANCE Clear Clear Clear Slightly Cloudy   URINEGLC Negative Negative Negative Negative   URINEBILI Negative Negative Negative Negative   URINEKETONE Negative Negative Negative Negative   SG 1.020 1.019 1.020 1.020   UBLD Small* Small* Negative Small*   URINEPH 5.0 5.0 6.0 5.0   PROTEIN Negative Negative Negative Negative   NITRITE Negative Negative Negative Negative   LEUKEST Trace* Moderate* Large* Large*   RBCU 1 3* 3* 5*   WBCU 1 10* 29* 15*     CT Chest without IV contrast 6/2/2015  Result Impression     1. 8 mm nodule, with adjacent smaller nodules, in the left lower lobe has been stable since outside chest CT 04/21/2014. Recommend follow-up imaging in 6-12 months.   2. Stable bilateral patchy ground glass opacities, likely due to shallow inspiration.   3. Mild right hilar adenopathy, which is likely reactive.     FINDINGS:  Stable 8 mm nodule in the superior segment of the left  lower lobe; this nodule has been stable since outside chest CT 04/21/2014. The two surrounding smaller nodules are relatively stable in appearance since 12/01/2014 (these nodules were obscured on outside chest CT). No new pulmonary nodules. Stable scattered groundglass opacities in both lungs which may be due to shallow inspiration. Tiny calcified granuloma right lung apex. Trace amount of atelectasis in the lingula. Scattered mediastinal and left hilar lymph nodes are more prominent on today's exam, however remain within normal limits for size. New mild right hilar adenopathy, with largest node measuring approximately 1 cm in maximal diameter.     Electronically signed by:    MAGALIS Vargas -[id number] 02-Jun-2015 10:46        Mario Freeman MD [phone] 02-Jun-2015 10:46       CT lung 2018: Stable lung nodules and 1 mm non-obstructing stone at upper pole of left kidney    ASSESSMENT AND PLAN:  # GPA (dx 2013)  Presented with pulmonary and renal involvement. S/P Rituximab q6 month since 6405-0551 without flare of the disease. As she has been through a quiescent course, we have agreed to space out and/or reduce the dosage of rituximab. Last dose of rituximab was Sept 2018 .   Mrs Veronica reports no symptoms suggestive of active vasculitis.   Review of her labs obtained in [place] reveal a stable renal function and neg ANCA.    Her BP appears to be acceptable.  I made no changes to her meds.  She has an appointment scheduled with me at the end of May.  We will keep it on the schedule and change to telephone or virtual visit as needed.    Mario Fernandes MD  Division of Renal Disease and Hypertension  Pager: [phone]  Date of encounter September 26, 2019 April 8, 2020

## 2020-04-10 LAB
ANTIMYELOPEROXIDASE (MPO) ABS: <9 U/ML (ref 0–9)
ANTIPROTEINASE 3 (PR-3) ABS: <3.5 U/ML (ref 0–3.5)
ATYPICAL P-ANCA TITER: NORMAL TITER
BASOPHILS - ABS (DIFF) - HISTORICAL: 0.1 X10E3/UL (ref 0–0.2)
BASOPHILS NFR BLD AUTO: 1 %
BUN SERPL-MCNC: 16 MG/DL (ref 8–27)
BUN/CREATININE RATIO: 18 (ref 12–28)
CALCIUM SERPL-MCNC: 9 MG/DL (ref 8.7–10.3)
CHLORIDE SERPLBLD-SCNC: 103 MMOL/L (ref 96–106)
CO2 SERPL-SCNC: 23 MMOL/L (ref 20–29)
CREAT SERPL-MCNC: 0.89 MG/DL (ref 0.57–1)
CYTOPLASMIC (C-ANCA): NORMAL TITER
EGFR IF AFRICN AM: 76 ML/MIN/1.73
EGFR IF NONAFRICN AM: 66 ML/MIN/1.73
EOSINOPHIL COUNT (ABSOLUTE): 0.4 X10E3/UL (ref 0–0.4)
EOSINOPHIL NFR BLD AUTO: 3 %
ERYTHROCYTE [DISTWIDTH] IN BLOOD BY AUTOMATED COUNT: 14.2 % (ref 12.3–15.4)
GLUCOSE SERPL-MCNC: 98 MG/DL (ref 65–99)
HCT VFR BLD AUTO: 41.1 % (ref 34–46.6)
HEMOGLOBIN: 13.8 G/DL (ref 11.1–15.9)
IMMATURE GRANS (ABS): 0 X10E3/UL (ref 0–0.1)
IMMATURE GRANULOCYTES: 0 %
LYMPHOCYTES - ABS (DIFF) - HISTORICAL: 1.5 X10E3/UL (ref 0.7–3.1)
LYMPHOCYTES NFR BLD AUTO: 13 %
MCH RBC QN AUTO: 30.2 PG (ref 26.6–33)
MCHC RBC AUTO-ENTMCNC: 33.6 G/DL (ref 31.5–35.7)
MCV RBC AUTO: 90 FL (ref 79–97)
MONOCYTES # BLD AUTO: 0.6 X10E3/UL (ref 0.1–0.9)
MONOCYTES NFR BLD AUTO: 5 %
NEUTROPHILS # BLD AUTO: 9 X10E3/UL (ref 1.4–7)
NEUTROPHILS NFR BLD AUTO: 78 %
P-ANCA TITER: NORMAL TITER
PHOSPHATE SERPL-MCNC: 3.3 MG/DL (ref 2.5–4.5)
PLATELET COUNT - QUEST: 344 X10E3/UL (ref 150–450)
POTASSIUM SERPL-SCNC: 4.7 MMOL/L (ref 3.5–5.2)
RBC # BLD AUTO: 4.57 10^12/L (ref 3.77–5.28)
SODIUM SERPL-SCNC: 141 MMOL/L (ref 134–144)
WBC # BLD AUTO: 11.5 10^9/L (ref 3.4–10.8)

## 2020-05-23 ENCOUNTER — VIRTUAL VISIT (OUTPATIENT)
Dept: NEPHROLOGY | Facility: CLINIC | Age: 70
End: 2020-05-23
Attending: INTERNAL MEDICINE
Payer: MEDICARE

## 2020-05-23 DIAGNOSIS — I77.6 VASCULITIS (H): Primary | ICD-10-CM

## 2020-05-23 NOTE — LETTER
Date:Rajni 3, 2020      Patient was self referred, no letter generated. Do not send.        [facility] Information

## 2020-05-23 NOTE — PROGRESS NOTES
"Waleska Veronica is a 69 year old female who is being evaluated via a billable video visit.      The patient has been notified of following:     \"This video visit will be conducted via a call between you and your physician/provider. We have found that certain health care needs can be provided without the need for an in-person physical exam.  This service lets us provide the care you need with a video conversation.  If a prescription is necessary we can send it directly to your pharmacy.  If lab work is needed we can place an order for that and you can then stop by our lab to have the test done at a later time.    Video visits are billed at different rates depending on your insurance coverage.  Please reach out to your insurance provider with any questions.    If during the course of the call the physician/provider feels a video visit is not appropriate, you will not be charged for this service.\"    Patient has given verbal consent for Video visit? Yes    How would you like to obtain your AVS? Rinhart    Patient would like the video invitation sent by: Send to e-mail at: [email or web address]   DID NOT WORK -> used [employer] instead.    Will anyone else be joining your video visit? No      Video-Visit Details    Type of service:  Video Visit    Video Start Time: 9:06 AM  Video End Time: 9:22 AM    Originating Location (pt. Location): Home    Distant Location (provider location):  [facility]     Platform used for Video Visit: [employer]    Mario Fernandes MD          Nephrology Clinic  May 23, 2020      Waleska Veronica MRN:[id number] YOB: 1950  Date of Service:May 23, 2020  Primary care provider: No Ref-Primary, Physician  Requesting physician: Self-referral    REASON FOR VISIT: ANCA Vasculitis  Waleska Veronica is a 69 year old female who is being evaluated via a billable telephone visit.      The patient has been notified of following:     \"This telephone visit will be conducted via a call between you " "and your physician/provider. We have found that certain health care needs can be provided without the need for a physical exam.  This service lets us provide the care you need with a short phone conversation.  If a prescription is necessary we can send it directly to your pharmacy.  If lab work is needed we can place an order for that and you can then stop by our lab to have the test done at a later time.    Telephone visits are billed at different rates depending on your insurance coverage. During this emergency period, for some insurers they may be billed the same as an in-person visit.  Please reach out to your insurance provider with any questions.    If during the course of the call the physician/provider feels a telephone visit is not appropriate, you will not be charged for this service.\"    Patient has given verbal consent for Telephone visit?  Yes      I have reviewed and updated the patient's Past Medical History, Social History, Family History and Medication List.      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 She was hospitalized x 10 days in [place]. Initially, hemoptysis attributed to pneumonia. She mentioned that her lung was \"messed up enough\" that they could not perform a (transbronchial) lung biopsy. She was put on prednisone and tapered off eventually. While she was at [place], the diagnosis of Wegener's was not confirmed.  March 2013:  Back in [place].  Admitted to [facility] with acute renal failure and scant hemoptysis.  She had significant fatigue, oral ulcers, scant purpura on the forearms ( \"months\" after finishing the course of prednisone).  She did not require dialysis.  Dx of " "\"Wegener's granulomatosis\"  -> plasmapheresis (x 10+ sessions?) under the care of Dr Lowry (nephrology) .  She received prednisone and was treated with Rituximab once per week for 4 weeks.  April 2013 - 2018  : She was on prednisone for around 4 months. She experienced a lot of side effect including weight gain, mood changes, cushingoid facies.  She denies have hyperglycemia.  She was tapered off prednisone She then received rituximab every 6 months (1000 mg)       Treatment summary  March 2013: Plasmapheresis + Rituximab  Rituximab 1000 mg IV every 6 month since initial treatment in 2013 SEPTEMBER 7, 2018: Last dose of Rituximab 1000 mg. (8months after previous dose)    October 2019:  Rituximab 500 mg maintenance dose    Interval summary  Her main question at this time pertains to the need for continued maintenance therapy with rituximab.  She asks about her risk for relapse.  Since she has been getting rituximab since the onset of the disease, she is not sure about how to move forward. She endorses having joint pain all of the time from multiple kinds of arthritis. She denies nose bleeding/hair loss/mouth sore but would have periodic nose congestion. Overall, she did not have any official flare' of the disease since March 2013. She only has the aforementioned symptoms which she thinks it could be related to cold or other minor illness. Reported feeling good 8/10. The bothersome symptoms were plantar fasciitis s/p steroid injection and back pain. She has not been using CPAP because she was not satisfied with the encounter with sleep providers.    3/2/19  Had a good trip to [place].  Reports no acute illness since the last visit.  Has hot flashes (old)  Dry nose,  No epistaxis.  No oral ulcers.  Cough, with post nasal drip..  Clear but occasional yellow  No F/Chills  Energy:  \" I'm lazy\"  No CP.  No GI symptoms.  No  symptoms.    4/18/19  She feels poorly, in multiple   She is more tired and achy.  \"all joints are " "hurting\" plantar fasciitis,  Thumbs and knees are hurting (also chronic)  More night sweats over the last 2 weeks.  No F/chills.  NO oral ulcers.  Cough is a little worse from sinus drainage.  More back aches.  One blister on external surface of R arm near elbow, which is now dry and scaly.  She admits to some anxiety because this is the 5th anniversary of her hospitalization.    June 1, 2019  Since last visit she had an URI, bad cough around April 29th.  Flu test was neg.  She eventually got better without antibiotics.  Still has a little cough.  NO F or Chills.  Continues to have chronic diffuse arthralgias.  Was seen by Dr Leger, and was told she had mild crusting.    Continues to have night sweats every night...  Since 1997.  No oral ulcers or epistaxis.  NO  symptoms.      September 26, 2019  Ms Veronica presents for follow up.  She presents feeling generally close to her baseline, but has a number of complaints.  She reports increased fatigue and arthralgias.    Increased frequency of night sweats, and \"hot flashes\" occurring during daytime.  No overt fevers or chills.  No over rash but has 3 new small macular purpuric spots on extensor surface of R forearm  No L Ext swelling.  No cough or hemoptysis, no oral ulcers.      April 8, 2020  Has been in [place] since October 2019.  REceived maintenance dose of Rituximab prior to departure  Has not been able to return to [place] bec of COVID19 pandemic  Her  suffered a CVA with L sided weekness.  She is his full time care taker.  She denies any acute illness, and feels generally well.   She denies F/Chills/Cough/SOB  Denies dysuria or gross hematuria  No L Ext swelling  No rash.    She checks her BP at home.  Most recent one was 130/78    Had surveillance blood work in Dec 2019 and late March 2020 .  Results were reviewed with Ms Veronica over the phone.  ANCA were negative at both dates  Hgb 13.8 and 14.2  Plt 344 and 384  Cr 0.89 and 0.85  Chol 187  HDL 40  TSH 5.8   "     May 23, 2020  Still in [place].  Hot flashes.  But no F/Chills/epistaxis/cough/SOB/CP/GI/ symptoms.    No unusual ADEOLA.  No rash  She is caring for her .  Gets 6-7K steps a day.  She is trying to lose weight, and states she lost 20 lbs.  Her goal is to get down to 199lbs.        PAST MEDICAL HISTORY:  Past Medical History:   Diagnosis Date     DVT (deep venous thrombosis) (H)     occured 1 month after starting OCP     GERD (gastroesophageal reflux disease)      Hyperuricemia      Hypothyroid      Obesity (BMI 35.0-39.9 without comorbidity)      Venous insufficiency (chronic) (peripheral)      Wegener's granulomatosis with renal involvement (H)     , No history of complicated pregnancies.  Last pregnancy with twins       PAST SURGICAL HISTORY:  Past Surgical History:   Procedure Laterality Date     HYSTERECTOMY       TUBAL LIGATION            MEDICATIONS:  Prescription Medications as of May 23, 2020          Rx Number Disp Refills Start End Last Dispensed Date Next Fill Date Owning Pharmacy    levothyroxine (SYNTHROID/LEVOTHROID) 112 MCG tablet            Sig: Take 112 mcg by mouth    Class: Historical    Route: Oral    LACTOBACILLUS PO            Si tablet    Class: Historical        ALLERGIES:    No Known Allergies     REVIEW OF SYSTEMS:  A comprehensive review of systems was performed and found to be negative except as described here or above.       Lives in [place]  Family is in [place]     FAMILY MEDICAL HISTORY:   Has 4 children, Daughter 48, Daughter 47, fraternal twin : daughter and son age 46    PHYSICAL EXAM:   There were no vitals taken for this visit.   BP at home 112/68  Weight 210 lbs   Alert, in NAD  Resp:  Breathing not labored  Neuro:  Moving normally.  Alert communicating normally.  Psy:  Mood and thought content are normal    LABS:     Result See Note 18  Comment:  KELSEY BRISCOE  MRN:    [id number]  Myeloperoxidase Antibody 0   Comment:  INTERPRETIVE INFORMATION:  Myeloperoxidase Abs, IgG    19 AU/mL or Less ......... Negative    20-25 AU/mL .............. Equivocal    26 AU/mL or Greater ...... Positive       Serine Protease 3 Antibody  Test                          Result   Units   Ref. Interval  Serine Protease3, IgG         4        AU/mL   0-19  INTERPRETIVE INFORMATION: Serine Protease 3, IgG  19 AU/mL or Less ........ Negative  20-25 AU/mL ............. Equivocal  26 AU/mL or Greater ..... Positive     No results found for this or any previous visit (from the past 168 hour(s)).  Albumin   Date Value Ref Range Status   09/26/2019 3.4 3.4 - 5.0 g/dL Final   08/10/2019 3.2 (L) 3.4 - 5.0 g/dL Final   06/01/2019 3.2 (L) 3.4 - 5.0 g/dL Final   04/18/2019 3.3 (L) 3.4 - 5.0 g/dL Final   03/02/2019 3.2 (L) 3.4 - 5.0 g/dL Final   01/19/2019 3.3 (L) 3.4 - 5.0 g/dL Final         URINE STUDIES  Recent Labs   Lab Test 09/26/19  1617 06/01/19  1105 04/18/19  1745 03/02/19  1106   COLOR Yellow Yellow Yellow Yellow   APPEARANCE Clear Clear Clear Slightly Cloudy   URINEGLC Negative Negative Negative Negative   URINEBILI Negative Negative Negative Negative   URINEKETONE Negative Negative Negative Negative   SG 1.020 1.019 1.020 1.020   UBLD Small* Small* Negative Small*   URINEPH 5.0 5.0 6.0 5.0   PROTEIN Negative Negative Negative Negative   NITRITE Negative Negative Negative Negative   LEUKEST Trace* Moderate* Large* Large*   RBCU 1 3* 3* 5*   WBCU 1 10* 29* 15*     CT Chest without IV contrast 6/2/2015  Result Impression     1. 8 mm nodule, with adjacent smaller nodules, in the left lower lobe has been stable since outside chest CT 04/21/2014. Recommend follow-up imaging in 6-12 months.   2. Stable bilateral patchy ground glass opacities, likely due to shallow inspiration.   3. Mild right hilar adenopathy, which is likely reactive.     FINDINGS:  Stable 8 mm nodule in the superior segment of the left lower lobe; this nodule has been stable since outside chest CT 04/21/2014. The two  surrounding smaller nodules are relatively stable in appearance since 12/01/2014 (these nodules were obscured on outside chest CT). No new pulmonary nodules. Stable scattered groundglass opacities in both lungs which may be due to shallow inspiration. Tiny calcified granuloma right lung apex. Trace amount of atelectasis in the lingula. Scattered mediastinal and left hilar lymph nodes are more prominent on today's exam, however remain within normal limits for size. New mild right hilar adenopathy, with largest node measuring approximately 1 cm in maximal diameter.     Electronically signed by:    MAGALIS Vargas -[id number] 02-Jun-2015 10:46        Mario Freeman MD [phone] 02-Jun-2015 10:46       CT lung 2018: Stable lung nodules and 1 mm non-obstructing stone at upper pole of left kidney    ASSESSMENT AND PLAN:  # GPA (dx 2013)  Presented with pulmonary and renal involvement. S/P Rituximab q6 month since [id number] without flare of the disease. As she has been through a quiescent course, we have agreed to space out and/or reduce the dosage of rituximab. Last dose of rituximab was Oct 2019.   Mrs Veronica reports no symptoms suggestive of active vasculitis.     Her last labs were in April 2020  We will repeat labs locally ([place]).  She voiced concerns about getting a maintenance dose of rituximab in the COVID environment.  She is tentatively planning on returning to [place] in mid to late July.  I will plan on having a follow up visit with her in late August.  She stated she will call us if she experiences a change in her symptoms.   Mario Fernandes MD  Division of Renal Disease and Hypertension  Pager: [phone]  May 23, 2020

## 2020-05-23 NOTE — LETTER
"5/23/2020       RE: Pito Veronica  [address]   [place] [address]     Dear Colleague,    Thank you for referring your patient, Pito Veronica, to the [facility] NEPHROLOGY at [facility]. Please see a copy of my visit note below.    Pito Veronica is a 69 year old female who is being evaluated via a billable video visit.      The patient has been notified of following:     \"This video visit will be conducted via a call between you and your physician/provider. We have found that certain health care needs can be provided without the need for an in-person physical exam.  This service lets us provide the care you need with a video conversation.  If a prescription is necessary we can send it directly to your pharmacy.  If lab work is needed we can place an order for that and you can then stop by our lab to have the test done at a later time.    Video visits are billed at different rates depending on your insurance coverage.  Please reach out to your insurance provider with any questions.    If during the course of the call the physician/provider feels a video visit is not appropriate, you will not be charged for this service.\"    Patient has given verbal consent for Video visit? Yes    How would you like to obtain your AVS? [facility]    Patient would like the video invitation sent by: Send to e-mail at: [email or web address]   DID NOT WORK -> used doximity instead.    Will anyone else be joining your video visit? No      Video-Visit Details    Type of service:  Video Visit    Video Start Time: 9:06 AM  Video End Time: 9:22 AM    Originating Location (pt. Location): Home    Distant Location (provider location):  [facility] NEPHROLOGY     Platform used for Video Visit: Doximity    Mario Fernandes MD          Nephrology Clinic  May 23, 2020      Pito Veronica MRN:[id number] YOB: 1950  Date of Service:May 23, 2020  Primary care provider: No Ref-Primary, " "Physician  Requesting physician: Self-referral    REASON FOR VISIT: ANCA Vasculitis  Waleska Veronica is a 69 year old female who is being evaluated via a billable telephone visit.      The patient has been notified of following:     \"This telephone visit will be conducted via a call between you and your physician/provider. We have found that certain health care needs can be provided without the need for a physical exam.  This service lets us provide the care you need with a short phone conversation.  If a prescription is necessary we can send it directly to your pharmacy.  If lab work is needed we can place an order for that and you can then stop by our lab to have the test done at a later time.    Telephone visits are billed at different rates depending on your insurance coverage. During this emergency period, for some insurers they may be billed the same as an in-person visit.  Please reach out to your insurance provider with any questions.    If during the course of the call the physician/provider feels a telephone visit is not appropriate, you will not be charged for this service.\"    Patient has given verbal consent for Telephone visit?  Yes      I have reviewed and updated the patient's Past Medical History, Social History, Family History and Medication List.      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 She was hospitalized x 10 days in [place]. Initially, hemoptysis attributed to pneumonia. She mentioned that her lung was \"messed up enough\" that they could not perform a (transbronchial) lung biopsy. She was put on prednisone and tapered off eventually. While she was at [place], the diagnosis " "of Wegener's was not confirmed.  March 2013:  Back in [place].  Admitted to [facility] with acute renal failure and scant hemoptysis.  She had significant fatigue, oral ulcers, scant purpura on the forearms ( \"months\" after finishing the course of prednisone).  She did not require dialysis.  Dx of \"Wegener's granulomatosis\"  -> plasmapheresis (x 10+ sessions?) under the care of Dr Lowry (nephrology) .  She received prednisone and was treated with Rituximab once per week for 4 weeks.  April 2013 - 2018  : She was on prednisone for around 4 months. She experienced a lot of side effect including weight gain, mood changes, cushingoid facies.  She denies have hyperglycemia.  She was tapered off prednisone She then received rituximab every 6 months (1000 mg)       Treatment summary  March 2013: Plasmapheresis + Rituximab  Rituximab 1000 mg IV every 6 month since initial treatment in 2013 SEPTEMBER 7, 2018: Last dose of Rituximab 1000 mg. (8months after previous dose)    October 2019:  Rituximab 500 mg maintenance dose    Interval summary  Her main question at this time pertains to the need for continued maintenance therapy with rituximab.  She asks about her risk for relapse.  Since she has been getting rituximab since the onset of the disease, she is not sure about how to move forward. She endorses having joint pain all of the time from multiple kinds of arthritis. She denies nose bleeding/hair loss/mouth sore but would have periodic nose congestion. Overall, she did not have any official flare' of the disease since March 2013. She only has the aforementioned symptoms which she thinks it could be related to cold or other minor illness. Reported feeling good 8/10. The bothersome symptoms were plantar fasciitis s/p steroid injection and back pain. She has not been using CPAP because she was not satisfied with the encounter with sleep providers.    3/2/19  Had a good trip to [place].  Reports no acute illness since the last " "visit.  Has hot flashes (old)  Dry nose,  No epistaxis.  No oral ulcers.  Cough, with post nasal drip..  Clear but occasional yellow  No F/Chills  Energy:  \" I'm lazy\"  No CP.  No GI symptoms.  No  symptoms.    4/18/19  She feels poorly, in multiple   She is more tired and achy.  \"all joints are hurting\" plantar fasciitis,  Thumbs and knees are hurting (also chronic)  More night sweats over the last 2 weeks.  No F/chills.  NO oral ulcers.  Cough is a little worse from sinus drainage.  More back aches.  One blister on external surface of R arm near elbow, which is now dry and scaly.  She admits to some anxiety because this is the 5th anniversary of her hospitalization.    June 1, 2019  Since last visit she had an URI, bad cough around April 29th.  Flu test was neg.  She eventually got better without antibiotics.  Still has a little cough.  NO F or Chills.  Continues to have chronic diffuse arthralgias.  Was seen by Dr Leger, and was told she had mild crusting.    Continues to have night sweats every night...  Since 1997.  No oral ulcers or epistaxis.  NO  symptoms.      September 26, 2019  Ms Veronica presents for follow up.  She presents feeling generally close to her baseline, but has a number of complaints.  She reports increased fatigue and arthralgias.    Increased frequency of night sweats, and \"hot flashes\" occurring during daytime.  No overt fevers or chills.  No over rash but has 3 new small macular purpuric spots on extensor surface of R forearm  No L Ext swelling.  No cough or hemoptysis, no oral ulcers.      April 8, 2020  Has been in [place] since October 2019.  REceived maintenance dose of Rituximab prior to departure  Has not been able to return to [place] bec of COVID19 pandemic  Her  suffered a CVA with L sided weekness.  She is his full time care taker.  She denies any acute illness, and feels generally well.   She denies F/Chills/Cough/SOB  Denies dysuria or gross hematuria  No L Ext swelling  No " rash.    She checks her BP at home.  Most recent one was 130/78    Had surveillance blood work in Dec 2019 and late 2020 .  Results were reviewed with Ms Veronica over the phone.  ANCA were negative at both dates  Hgb 13.8 and 14.2  Plt 344 and 384  Cr 0.89 and 0.85  Chol 187  HDL 40  TSH 5.8       May 23, 2020  Still in [place].  Hot flashes.  But no F/Chills/epistaxis/cough/SOB/CP/GI/ symptoms.    No unusual ADEOLA.  No rash  She is caring for her .  Gets 6-7K steps a day.  She is trying to lose weight, and states she lost 20 lbs.  Her goal is to get down to 199lbs.        PAST MEDICAL HISTORY:  Past Medical History:   Diagnosis Date     DVT (deep venous thrombosis) (H)     occured 1 month after starting OCP     GERD (gastroesophageal reflux disease)      Hyperuricemia      Hypothyroid      Obesity (BMI 35.0-39.9 without comorbidity)      Venous insufficiency (chronic) (peripheral)      Wegener's granulomatosis with renal involvement (H)     , No history of complicated pregnancies.  Last pregnancy with twins       PAST SURGICAL HISTORY:  Past Surgical History:   Procedure Laterality Date     HYSTERECTOMY       TUBAL LIGATION            MEDICATIONS:  Prescription Medications as of May 23, 2020          Rx Number Disp Refills Start End Last Dispensed Date Next Fill Date Owning Pharmacy    levothyroxine (SYNTHROID/LEVOTHROID) 112 MCG tablet            Sig: Take 112 mcg by mouth    Class: Historical    Route: Oral    LACTOBACILLUS PO            Si tablet    Class: Historical        ALLERGIES:    No Known Allergies     REVIEW OF SYSTEMS:  A comprehensive review of systems was performed and found to be negative except as described here or above.       Lives in [place]  Family is in [place]     FAMILY MEDICAL HISTORY:   Has 4 children, Daughter 48, Daughter 47, fraternal twin : daughter and son age 46    PHYSICAL EXAM:   There were no vitals taken for this visit.   BP at home 112/68  Weight  210 lbs   Alert, in NAD  Resp:  Breathing not labored  Neuro:  Moving normally.  Alert communicating normally.  Psy:  Mood and thought content are normal    LABS:     Result See Note 4/11/18  Comment:  KELSEY BRISCOE  MRN:    [id number]  Myeloperoxidase Antibody 0   Comment:  INTERPRETIVE INFORMATION: Myeloperoxidase Abs, IgG    19 AU/mL or Less ......... Negative    20-25 AU/mL .............. Equivocal    26 AU/mL or Greater ...... Positive       Serine Protease 3 Antibody  Test                          Result   Units   Ref. Interval  Serine Protease3, IgG         4        AU/mL   0-19  INTERPRETIVE INFORMATION: Serine Protease 3, IgG  19 AU/mL or Less ........ Negative  20-25 AU/mL ............. Equivocal  26 AU/mL or Greater ..... Positive     No results found for this or any previous visit (from the past 168 hour(s)).  Albumin   Date Value Ref Range Status   09/26/2019 3.4 3.4 - 5.0 g/dL Final   08/10/2019 3.2 (L) 3.4 - 5.0 g/dL Final   06/01/2019 3.2 (L) 3.4 - 5.0 g/dL Final   04/18/2019 3.3 (L) 3.4 - 5.0 g/dL Final   03/02/2019 3.2 (L) 3.4 - 5.0 g/dL Final   01/19/2019 3.3 (L) 3.4 - 5.0 g/dL Final         URINE STUDIES  Recent Labs   Lab Test 09/26/19  1617 06/01/19  1105 04/18/19  1745 03/02/19  1106   COLOR Yellow Yellow Yellow Yellow   APPEARANCE Clear Clear Clear Slightly Cloudy   URINEGLC Negative Negative Negative Negative   URINEBILI Negative Negative Negative Negative   URINEKETONE Negative Negative Negative Negative   SG 1.020 1.019 1.020 1.020   UBLD Small* Small* Negative Small*   URINEPH 5.0 5.0 6.0 5.0   PROTEIN Negative Negative Negative Negative   NITRITE Negative Negative Negative Negative   LEUKEST Trace* Moderate* Large* Large*   RBCU 1 3* 3* 5*   WBCU 1 10* 29* 15*     CT Chest without IV contrast 6/2/2015  Result Impression     1. 8 mm nodule, with adjacent smaller nodules, in the left lower lobe has been stable since outside chest CT 04/21/2014. Recommend follow-up imaging in 6-12 months.    2. Stable bilateral patchy ground glass opacities, likely due to shallow inspiration.   3. Mild right hilar adenopathy, which is likely reactive.     FINDINGS:  Stable 8 mm nodule in the superior segment of the left lower lobe; this nodule has been stable since outside chest CT 04/21/2014. The two surrounding smaller nodules are relatively stable in appearance since 12/01/2014 (these nodules were obscured on outside chest CT). No new pulmonary nodules. Stable scattered groundglass opacities in both lungs which may be due to shallow inspiration. Tiny calcified granuloma right lung apex. Trace amount of atelectasis in the lingula. Scattered mediastinal and left hilar lymph nodes are more prominent on today's exam, however remain within normal limits for size. New mild right hilar adenopathy, with largest node measuring approximately 1 cm in maximal diameter.     Electronically signed by:    MAGALIS Vargas -[id number] 02-Jun-2015 10:46        Mario Freeman MD [id number] 02-Jun-2015 10:46       CT lung 2018: Stable lung nodules and 1 mm non-obstructing stone at upper pole of left kidney    ASSESSMENT AND PLAN:  # GPA (dx 2013)  Presented with pulmonary and renal involvement. S/P Rituximab q6 month since 2840-3282 without flare of the disease. As she has been through a quiescent course, we have agreed to space out and/or reduce the dosage of rituximab. Last dose of rituximab was Oct 2019.   Mrs Veronica reports no symptoms suggestive of active vasculitis.     Her last labs were in April 2020  We will repeat labs locally ([place]).  She voiced concerns about getting a maintenance dose of rituximab in the COVID environment.  She is tentatively planning on returning to [place] in mid to late July.  I will plan on having a follow up visit with her in late August.  She stated she will call us if she experiences a change in her symptoms.   Mario Fernandes MD  Division of Renal Disease and Hypertension  Pager: [phone]  May 23,  2020                        Again, thank you for allowing me to participate in the care of your patient.      Sincerely,    Mario Fernandes MD

## 2020-05-27 ENCOUNTER — MYC MEDICAL ADVICE (OUTPATIENT)
Dept: RHEUMATOLOGY | Facility: CLINIC | Age: 70
End: 2020-05-27

## 2020-05-27 DIAGNOSIS — I77.82 ANCA-ASSOCIATED VASCULITIS (H): Primary | ICD-10-CM

## 2020-06-05 ENCOUNTER — MYC MEDICAL ADVICE (OUTPATIENT)
Dept: NEPHROLOGY | Facility: CLINIC | Age: 70
End: 2020-06-05

## 2020-06-05 ENCOUNTER — TELEPHONE (OUTPATIENT)
Dept: NEPHROLOGY | Facility: CLINIC | Age: 70
End: 2020-06-05

## 2020-06-05 ENCOUNTER — TRANSFERRED RECORDS (OUTPATIENT)
Dept: HEALTH INFORMATION MANAGEMENT | Facility: CLINIC | Age: 70
End: 2020-06-05

## 2020-06-05 NOTE — TELEPHONE ENCOUNTER
Called and spoke with Remedios, she will order the leukocyte panel which will at the very least give us a CD20 panel.    Ailyn Gonzalez RN  Rheumatology Clinic

## 2020-06-05 NOTE — TELEPHONE ENCOUNTER
[facility] Call Center    Phone Message    May a detailed message be left on voicemail: yes     Reason for Call: Other: Pt calling in to tell Ailyn and provider that she did not do two of the labs ad her insurance did not cover them Please follow up with pt when available. Thank you      Action Taken: Message routed to:  [facility] Neph    Travel Screening: Not Applicable

## 2020-06-05 NOTE — TELEPHONE ENCOUNTER
Lab results from March entered.  Pt called as she needed lab orders for this month re-faxed.    Ailyn Gonzalez RN  Rheumatology Clinic

## 2020-06-05 NOTE — TELEPHONE ENCOUNTER
[facility] Call Center    Phone Message    May a detailed message be left on voicemail: yes     Reason for Call: Remedios calling from [facility] [employer] [facility] ([place], [place]) stating ordered CD19 by Dr. Delgado is unable to be completed on it's own per their lab guidelines. Patient is there now to complete labs and they are requesting a call back to clarify if Provider would like to order the CD19 lab with a Leukemia/Lymphoma panel or a Leukocyte panel. Please contact Remedios directly at: [phone]    Action Taken: Message routed to:  [facility] NEPHROLOGY    Travel Screening: Negative

## 2020-06-07 LAB
ABSOLUTE BASOPHILS (EXTERNAL): 0.03 K/UL (ref 0–0.2)
ABSOLUTE IMMATURE GRANULOCYTES (EXTERNAL): 0.02 K/UL (ref 0–0.1)
ABSOLUTE NEUTROPHILS (EXTERNAL): 3.05 K/UL (ref 1.6–9.3)
ALBUMIN (URINE): NORMAL MG/DL
ALBUMIN SERPL-MCNC: 3.9 G/DL (ref 3.4–4.9)
ANION GAP SERPL CALCULATED.3IONS-SCNC: NORMAL MMOL/L
APPEARANCE UR: NORMAL
BACTERIA, UR: NORMAL
BASOPHILS NFR BLD AUTO: 0.6 %
BILIRUB UR QL: NORMAL
BUN SERPL-MCNC: 18 MG/DL (ref 8–25)
CALCIUM SERPL-MCNC: 8.7 MG/DL (ref 8.7–10.4)
CHLORIDE SERPLBLD-SCNC: 107 MMOL/L (ref 95–108)
CO2 SERPL-SCNC: 23 MMOL/L (ref 19–31)
COLOR UR: YELLOW
CREAT SERPL-MCNC: 0.81 MG/DL (ref 0.6–1.4)
CREATININE, URINE: 293 MG/DL (ref 19–280)
CRYSTALS - QUEST: NORMAL
EOSINOPHIL # BLD AUTO: 0.13 K/UL (ref 0–0.7)
EOSINOPHIL NFR BLD AUTO: 2.5 %
ERYTHROCYTE [DISTWIDTH] IN BLOOD BY AUTOMATED COUNT: 47.8 % (ref 38–49)
GLUCOSE SERPL-MCNC: 74 MG/DL (ref 65–99)
GLUCOSE URINE: NORMAL MG/DL
GRANULAR CASTS: NORMAL
HCT VFR BLD AUTO: 46.1 % (ref 35–48)
HEMOGLOBIN: 14.1 G/DL (ref 11.5–16)
HGB UR QL: NORMAL
HYALINE CASTS: NORMAL
IMMATURE GRANULOCYTES: 0.4 %
KETONES UR QL: NORMAL MG/DL
LEUKOCYTE ESTERASE URINE: NORMAL
LYMPHOCYTES # BLD AUTO: 1.32 K/UL (ref 0.6–5.5)
LYMPHOCYTES NFR BLD AUTO: 25.9 %
Lab: 1
MCH RBC QN AUTO: 28.6 PG (ref 27–34)
MCHC RBC AUTO-ENTMCNC: 30.6 G/DL (ref 31–37)
MCV RBC AUTO: 93.5 FL (ref 78–100)
MONOCYTES # BLD AUTO: 0.55 K/UL (ref 0.1–1.6)
MONOCYTES NFR BLD AUTO: 10.8 %
MUCOUS URINE: NORMAL
MYELOPEROXIDASE: <0.2 %
NEUTROPHILS NFR BLD AUTO: 59.8 %
NITRITE UR QL STRIP: NORMAL
PH BLDA: 5 [PH] (ref 5–7)
PHOSPHATE SERPL-MCNC: 3.5 MG/DL (ref 2.4–4.8)
PLATELET COUNT - QUEST: 291 10^9/L (ref 130–450)
POTASSIUM SERPL-SCNC: 4.5 MMOL/L (ref 3.6–5.3)
PROT UR-MCNC: 85 MG/DL (ref 15–220)
RBC # BLD AUTO: 4.93 10^12/L (ref 3.7–5.4)
RBC, UR MICRO: NORMAL (ref ?–2)
SODIUM SERPL-SCNC: 141 MMOL/L (ref 134–147)
SP GR UR STRIP: NORMAL (ref 1–1.03)
SQUAMOUS EPI: NORMAL
UROBILINOGEN UR QL STRIP: 0.2 EU/DL (ref 0.2–1)
WBC # BLD AUTO: 5.1 10^9/L (ref 4–11)
WBC, UR MICRO: NORMAL (ref ?–2)

## 2020-06-08 ENCOUNTER — MYC MEDICAL ADVICE (OUTPATIENT)
Dept: NEPHROLOGY | Facility: CLINIC | Age: 70
End: 2020-06-08

## 2020-06-08 NOTE — TELEPHONE ENCOUNTER
Spoke with pt, the cost for the CD19 B cell test would be close to $200.  She is not able to afford that.  Will update Dr. Fernandes.    Ailyn Gonzalez RN  Rheumatology Clinic

## 2020-06-08 NOTE — TELEPHONE ENCOUNTER
Called and spoke with pt, she has tested positive for COVID.  Feels like she has a slight cold. She is having some pain in her thighs and knees, feels like pulling.  She is going to try to do some gentle stretching to help with the feeling.  She is aware of when to seek medical care.  Pt will call if she has any concerns about her Vasculitis flaring.    Ailyn Gonzalez RN  Rheumatology Clinic

## 2020-08-20 ENCOUNTER — VIRTUAL VISIT (OUTPATIENT)
Dept: NEPHROLOGY | Facility: CLINIC | Age: 70
End: 2020-08-20
Attending: INTERNAL MEDICINE
Payer: MEDICARE

## 2020-08-20 DIAGNOSIS — I77.82 ANCA-ASSOCIATED VASCULITIS (H): ICD-10-CM

## 2020-08-20 DIAGNOSIS — I77.6 VASCULITIS (H): Primary | ICD-10-CM

## 2020-08-20 ASSESSMENT — PAIN SCALES - GENERAL: PAINLEVEL: NO PAIN (0)

## 2020-08-20 NOTE — LETTER
Date:August 28, 2020      Patient was self referred, no letter generated. Do not send.        [facility] Information

## 2020-08-20 NOTE — PROGRESS NOTES
"Waleska Veronica is a 69 year old female who is being evaluated via a billable telephone visit.      The patient has been notified of following:     \"This telephone visit will be conducted via a call between you and your physician/provider. We have found that certain health care needs can be provided without the need for a physical exam.  This service lets us provide the care you need with a short phone conversation.  If a prescription is necessary we can send it directly to your pharmacy.  If lab work is needed we can place an order for that and you can then stop by our lab to have the test done at a later time.    Telephone visits are billed at different rates depending on your insurance coverage. During this emergency period, for some insurers they may be billed the same as an in-person visit.  Please reach out to your insurance provider with any questions.    If during the course of the call the physician/provider feels a telephone visit is not appropriate, you will not be charged for this service.\"    Patient has given verbal consent for Telephone visit?  Yes    What phone number would you like to be contacted at? [phone]    How would you like to obtain your AVS? 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    2013: Presented with epistaxis, fatigue and followed by hemoptysis. She was hospitalized x 10 days in [place]. Initially, hemoptysis attributed to pneumonia. She mentioned that her lung was \"messed up enough\" that they could not " "perform a (transbronchial) lung biopsy. She was put on prednisone and tapered off eventually. While she was at [place], the diagnosis of Wegener's was not confirmed.  March 2013:  Back in [place].  Admitted to [facility] with acute renal failure and scant hemoptysis.  She had significant fatigue, oral ulcers, scant purpura on the forearms ( \"months\" after finishing the course of prednisone).  She did not require dialysis.  Dx of \"Wegener's granulomatosis\"  -> plasmapheresis (x 10+ sessions?) under the care of Dr Lowry (nephrology) .  She received prednisone and was treated with Rituximab once per week for 4 weeks.  April 2013 - 2018  : She was on prednisone for around 4 months. She experienced a lot of side effect including weight gain, mood changes, cushingoid facies.  She denies have hyperglycemia.  She was tapered off prednisone She then received rituximab every 6 months (1000 mg)     Treatment summary  March 2013: Plasmapheresis + Rituximab  Rituximab 1000 mg IV every 6 month since initial treatment in 2013 SEPTEMBER 7, 2018: Last dose of Rituximab 1000 mg. (8months after previous dose)    October 2019:  Rituximab 500 mg maintenance dose    3/2/19  Had a good trip to [place].  Reports no acute illness since the last visit.  Has hot flashes (old)  Dry nose,  No epistaxis.  No oral ulcers.  Cough, with post nasal drip..  Clear but occasional yellow  No F/Chills  Energy:  \" I'm lazy\"  No CP.  No GI symptoms.  No  symptoms.    4/18/19  She feels poorly, in multiple   She is more tired and achy.  \"all joints are hurting\" plantar fasciitis,  Thumbs and knees are hurting (also chronic)  More night sweats over the last 2 weeks.  No F/chills.  NO oral ulcers.  Cough is a little worse from sinus drainage.  More back aches.  One blister on external surface of R arm near elbow, which is now dry and scaly.  She admits to some anxiety because this is the 5th anniversary of her hospitalization.    June 1, 2019  Since last visit she " "had an URI, bad cough around April 29th.  Flu test was neg.  She eventually got better without antibiotics.  Still has a little cough.  NO F or Chills.  Continues to have chronic diffuse arthralgias.  Was seen by Dr Leger, and was told she had mild crusting.    Continues to have night sweats every night...  Since 1997.  No oral ulcers or epistaxis.  NO  symptoms.      September 26, 2019  Ms Veronica presents for follow up.  She presents feeling generally close to her baseline, but has a number of complaints.  She reports increased fatigue and arthralgias.    Increased frequency of night sweats, and \"hot flashes\" occurring during daytime.  No overt fevers or chills.  No over rash but has 3 new small macular purpuric spots on extensor surface of R forearm  No L Ext swelling.  No cough or hemoptysis, no oral ulcers.      April 8, 2020  Has been in [place] since October 2019.  REceived maintenance dose of Rituximab prior to departure  Has not been able to return to [place] bec of COVID19 pandemic  Her  suffered a CVA with L sided weekness.  She is his full time care taker.  She denies any acute illness, and feels generally well.   She denies F/Chills/Cough/SOB  Denies dysuria or gross hematuria  No L Ext swelling  No rash.    She checks her BP at home.  Most recent one was 130/78    Had surveillance blood work in Dec 2019 and late March 2020 .  Results were reviewed with Ms Veronica over the phone.  ANCA were negative at both dates  Hgb 13.8 and 14.2  Plt 344 and 384  Cr 0.89 and 0.85  Chol 187  HDL 40  TSH 5.8    May 23, 2020  Still in [place].  Hot flashes.  But no F/Chills/epistaxis/cough/SOB/CP/GI/ symptoms.    No unusual ADEOLA.  No rash  She is caring for her .  Gets 6-7K steps a day.  She is trying to lose weight, and states she lost 20 lbs.  Her goal is to get down to 199lbs.    August 20, 2020  At this time, she reports overall feeling okay. She did have COVID infection end of May/June, and had some URI " symptoms/chills and joint pains.  Since recovering, she is feeling back to her self overall. No fevers, chills, epistaxis, cough, chest pain, SOB, GI/ symptoms. No rashes. She continues to have chronic pain in multiple areas (ribs, knees, back), but nothing new or different. She states she has had hot flashes/sweats since , and they have waxed and waned all those years, and continue to persist without significant change.  She is caring for her , who had a stroke in March. She still continues to get 5K-7K steps a day. She continues to try and work to lose weight, hit a bit of a stall, hanging around 205 lbs.     PAST MEDICAL HISTORY:  Past Medical History:   Diagnosis Date     DVT (deep venous thrombosis) (H)     occured 1 month after starting OCP     GERD (gastroesophageal reflux disease)      Hyperuricemia      Hypothyroid      Obesity (BMI 35.0-39.9 without comorbidity)      Venous insufficiency (chronic) (peripheral)      Wegener's granulomatosis with renal involvement (H)     , No history of complicated pregnancies.  Last pregnancy with twins       PAST SURGICAL HISTORY:  Past Surgical History:   Procedure Laterality Date     HYSTERECTOMY       TUBAL LIGATION        MEDICATIONS:  Current Outpatient Medications   Medication     levothyroxine (SYNTHROID/LEVOTHROID) 137 MCG tablet     ALLERGIES:    No Known Allergies     REVIEW OF SYSTEMS:  A comprehensive review of systems was performed and found to be negative except as described here or above.     FAMILY MEDICAL HISTORY:   Has 4 children, Daughter 48, Daughter 47, fraternal twin : daughter and son age 46    PHYSICAL EXAM:   No exam due to phone visit    BP at home 110-120/66-75  Weight 205 lbs     LABS:   Result See Note 18  Comment:  KELSEY BRISCOE  MRN:    [id number]  Myeloperoxidase Antibody 0   Comment:  INTERPRETIVE INFORMATION: Myeloperoxidase Abs, IgG    19 AU/mL or Less ......... Negative    20-25 AU/mL ..............  Equivocal    26 AU/mL or Greater ...... Positive       Serine Protease 3 Antibody  Test                          Result   Units   Ref. Interval  Serine Protease3, IgG         4        AU/mL   0-19  INTERPRETIVE INFORMATION: Serine Protease 3, IgG  19 AU/mL or Less ........ Negative  20-25 AU/mL ............. Equivocal  26 AU/mL or Greater ..... Positive     No results found for this or any previous visit (from the past 168 hour(s)).  Albumin   Date Value Ref Range Status   06/05/2020 3.9 3.4 - 4.9 g/dL Final   03/24/2020 4.0 3.4 - 4.9 g/dL Final   09/26/2019 3.4 3.4 - 5.0 g/dL Final   08/10/2019 3.2 (L) 3.4 - 5.0 g/dL Final   06/01/2019 3.2 (L) 3.4 - 5.0 g/dL Final   04/18/2019 3.3 (L) 3.4 - 5.0 g/dL Final   03/02/2019 3.2 (L) 3.4 - 5.0 g/dL Final         URINE STUDIES  Recent Labs   Lab Test 06/05/20  1114 09/26/19  1617 06/01/19  1105 04/18/19  1745 03/02/19  1106   COLOR Yellow Yellow Yellow Yellow Yellow   APPEARANCE Other Clear Clear Clear Slightly Cloudy   URINEGLC Neg Negative Negative Negative Negative   URINEBILI Other Negative Negative Negative Negative   URINEKETONE Neg Negative Negative Negative Negative   SG Other 1.020 1.019 1.020 1.020   UBLD Neg Small* Small* Negative Small*   URINEPH  --  5.0 5.0 6.0 5.0   PROTEIN  --  Negative Negative Negative Negative   UROBILINOGEN 0.2  --   --   --   --    NITRITE Neg Negative Negative Negative Negative   LEUKEST Small Trace* Moderate* Large* Large*   RBCU  --  1 3* 3* 5*   WBCU  --  1 10* 29* 15*     CT Chest without IV contrast 6/2/2015  Result Impression     1. 8 mm nodule, with adjacent smaller nodules, in the left lower lobe has been stable since outside chest CT 04/21/2014. Recommend follow-up imaging in 6-12 months.   2. Stable bilateral patchy ground glass opacities, likely due to shallow inspiration.   3. Mild right hilar adenopathy, which is likely reactive.     FINDINGS:  Stable 8 mm nodule in the superior segment of the left lower lobe; this nodule  has been stable since outside chest CT 04/21/2014. The two surrounding smaller nodules are relatively stable in appearance since 12/01/2014 (these nodules were obscured on outside chest CT). No new pulmonary nodules. Stable scattered groundglass opacities in both lungs which may be due to shallow inspiration. Tiny calcified granuloma right lung apex. Trace amount of atelectasis in the lingula. Scattered mediastinal and left hilar lymph nodes are more prominent on today's exam, however remain within normal limits for size. New mild right hilar adenopathy, with largest node measuring approximately 1 cm in maximal diameter.     Electronically signed by:    MAGALIS Vargas -[id number] 02-Jun-2015 10:46        Mario Freeman MD [phone] 02-Jun-2015 10:46       CT lung 2018: Stable lung nodules and 1 mm non-obstructing stone at upper pole of left kidney    ASSESSMENT AND PLAN:  # GPA (dx 2013)  Presented with pulmonary and renal involvement. S/P Rituximab q6 month since 1639-4277 without flare of the disease. As she has been through a quiescent course, we agreed to space out and/or reduce the dosage of rituximab. Last dose of rituximab was Oct 2019.     At this time, listening to her and discussing with her, lower suspicion for relapse of disease. However, she did have a + PR3 on her last check 6/5/2020. At this time, will plan to repeat her labs as soon as able and pending results, CD19 + PR3 Titer, can determine need for redosing of Rituximab.     Pt staffed with Dr. Fernandes.     KERLINE Mackenzie MD  Fellow  [phone]      Attending Attestation  This patient Was evaluated by me, Mario Fernandes MD on August 20, 2020 IN CONJUNCTION WITH Dr Mackenzie.  Discussed with Dr Mackenzie and agree with the findings and plan in this note.  I have reviewed  Medications and ROS.  Mario Fernandes MD  Division of Renal Disease and Hypertension  Pager: [phone]  August 21, 2020  8:49 AM

## 2020-08-20 NOTE — LETTER
"8/20/2020       RE: Waleska Veronica  [address]  [place] [address]     Dear Colleague,    Thank you for referring your patient, Waleska Veronica, to the [facility] NEPHROLOGY at [facility]. Please see a copy of my visit note below.    Waleska Veronica is a 69 year old female who is being evaluated via a billable telephone visit.      The patient has been notified of following:     \"This telephone visit will be conducted via a call between you and your physician/provider. We have found that certain health care needs can be provided without the need for a physical exam.  This service lets us provide the care you need with a short phone conversation.  If a prescription is necessary we can send it directly to your pharmacy.  If lab work is needed we can place an order for that and you can then stop by our lab to have the test done at a later time.    Telephone visits are billed at different rates depending on your insurance coverage. During this emergency period, for some insurers they may be billed the same as an in-person visit.  Please reach out to your insurance provider with any questions.    If during the course of the call the physician/provider feels a telephone visit is not appropriate, you will not be charged for this service.\"    Patient has given verbal consent for Telephone visit?  Yes    What phone number would you like to be contacted at? [phone]    How would you like to obtain your AVS? [employer]    Phone call duration: 22 minutes    Jesse Mackenzie MD    ------      Nephrology Clinic  August 20, 2020      Waleska Veronica MRN:[id number] YOB: 1950    REASON FOR VISIT: ANCA Vasculitis    HISTORY OF PRESENT ILLNESS:   Waleska Veronica is a 68 year old female with GERD, hypothyroidism, hyperuricemia, Obesity, JOSE ARMANDO not on CPAP and chronic venous insufficiency who presented initially for evaluation of ANCA vasculitis    She has a history of ANCA " "vasculitis with pulmonary and renal involvement.    2013: Presented with epistaxis, fatigue and followed by hemoptysis. She was hospitalized x 10 days in [place]. Initially, hemoptysis attributed to pneumonia. She mentioned that her lung was \"messed up enough\" that they could not perform a (transbronchial) lung biopsy. She was put on prednisone and tapered off eventually. While she was at [place], the diagnosis of Wegener's was not confirmed.  March 2013:  Back in [place].  Admitted to [facility] with acute renal failure and scant hemoptysis.  She had significant fatigue, oral ulcers, scant purpura on the forearms ( \"months\" after finishing the course of prednisone).  She did not require dialysis.  Dx of \"Wegener's granulomatosis\"  -> plasmapheresis (x 10+ sessions?) under the care of Dr Lowry (nephrology) .  She received prednisone and was treated with Rituximab once per week for 4 weeks.  April 2013 - 2018  : She was on prednisone for around 4 months. She experienced a lot of side effect including weight gain, mood changes, cushingoid facies.  She denies have hyperglycemia.  She was tapered off prednisone She then received rituximab every 6 months (1000 mg)     Treatment summary  March 2013: Plasmapheresis + Rituximab  Rituximab 1000 mg IV every 6 month since initial treatment in 2013 SEPTEMBER 7, 2018: Last dose of Rituximab 1000 mg. (8months after previous dose)    October 2019:  Rituximab 500 mg maintenance dose    3/2/19  Had a good trip to [place].  Reports no acute illness since the last visit.  Has hot flashes (old)  Dry nose,  No epistaxis.  No oral ulcers.  Cough, with post nasal drip..  Clear but occasional yellow  No F/Chills  Energy:  \" I'm lazy\"  No CP.  No GI symptoms.  No  symptoms.    4/18/19  She feels poorly, in multiple   She is more tired and achy.  \"all joints are hurting\" plantar fasciitis,  Thumbs and knees are hurting (also chronic)  More night sweats over the last 2 weeks.  No F/chills.  NO " "oral ulcers.  Cough is a little worse from sinus drainage.  More back aches.  One blister on external surface of R arm near elbow, which is now dry and scaly.  She admits to some anxiety because this is the 5th anniversary of her hospitalization.    June 1, 2019  Since last visit she had an URI, bad cough around April 29th.  Flu test was neg.  She eventually got better without antibiotics.  Still has a little cough.  NO F or Chills.  Continues to have chronic diffuse arthralgias.  Was seen by Dr Leger, and was told she had mild crusting.    Continues to have night sweats every night...  Since 1997.  No oral ulcers or epistaxis.  NO  symptoms.      September 26, 2019  Ms Veronica presents for follow up.  She presents feeling generally close to her baseline, but has a number of complaints.  She reports increased fatigue and arthralgias.    Increased frequency of night sweats, and \"hot flashes\" occurring during daytime.  No overt fevers or chills.  No over rash but has 3 new small macular purpuric spots on extensor surface of R forearm  No L Ext swelling.  No cough or hemoptysis, no oral ulcers.      April 8, 2020  Has been in [place] since October 2019.  REceived maintenance dose of Rituximab prior to departure  Has not been able to return to [place] bec of COVID19 pandemic  Her  suffered a CVA with L sided weekness.  She is his full time care taker.  She denies any acute illness, and feels generally well.   She denies F/Chills/Cough/SOB  Denies dysuria or gross hematuria  No L Ext swelling  No rash.    She checks her BP at home.  Most recent one was 130/78    Had surveillance blood work in Dec 2019 and late March 2020 .  Results were reviewed with Ms Veronica over the phone.  ANCA were negative at both dates  Hgb 13.8 and 14.2  Plt 344 and 384  Cr 0.89 and 0.85  Chol 187  HDL 40  TSH 5.8    May 23, 2020  Still in [place].  Hot flashes.  But no F/Chills/epistaxis/cough/SOB/CP/GI/ symptoms.    No unusual ADEOLA.  No rash  She is " caring for her .  Gets 6-7K steps a day.  She is trying to lose weight, and states she lost 20 lbs.  Her goal is to get down to 199lbs.    2020  At this time, she reports overall feeling okay. She did have COVID infection end of May/, and had some URI symptoms/chills and joint pains.  Since recovering, she is feeling back to her self overall. No fevers, chills, epistaxis, cough, chest pain, SOB, GI/ symptoms. No rashes. She continues to have chronic pain in multiple areas (ribs, knees, back), but nothing new or different. She states she has had hot flashes/sweats since , and they have waxed and waned all those years, and continue to persist without significant change.  She is caring for her , who had a stroke in March. She still continues to get 5K-7K steps a day. She continues to try and work to lose weight, hit a bit of a stall, hanging around 205 lbs.     PAST MEDICAL HISTORY:  Past Medical History:   Diagnosis Date     DVT (deep venous thrombosis) (H)     occured 1 month after starting OCP     GERD (gastroesophageal reflux disease)      Hyperuricemia      Hypothyroid      Obesity (BMI 35.0-39.9 without comorbidity)      Venous insufficiency (chronic) (peripheral)      Wegener's granulomatosis with renal involvement (H)     , No history of complicated pregnancies.  Last pregnancy with twins       PAST SURGICAL HISTORY:  Past Surgical History:   Procedure Laterality Date     HYSTERECTOMY       TUBAL LIGATION        MEDICATIONS:  Current Outpatient Medications   Medication     levothyroxine (SYNTHROID/LEVOTHROID) 137 MCG tablet     ALLERGIES:    No Known Allergies     REVIEW OF SYSTEMS:  A comprehensive review of systems was performed and found to be negative except as described here or above.     FAMILY MEDICAL HISTORY:   Has 4 children, Daughter 48, Daughter 47, fraternal twin : daughter and son age 46    PHYSICAL EXAM:   No exam due to phone visit    BP at home  110-120/66-75  Weight 205 lbs     LABS:   Result See Note 4/11/18  Comment:  KELSEY BRISCOE  MRN:    [id number]  Myeloperoxidase Antibody 0   Comment:  INTERPRETIVE INFORMATION: Myeloperoxidase Abs, IgG    19 AU/mL or Less ......... Negative    20-25 AU/mL .............. Equivocal    26 AU/mL or Greater ...... Positive       Serine Protease 3 Antibody  Test                          Result   Units   Ref. Interval  Serine Protease3, IgG         4        AU/mL   0-19  INTERPRETIVE INFORMATION: Serine Protease 3, IgG  19 AU/mL or Less ........ Negative  20-25 AU/mL ............. Equivocal  26 AU/mL or Greater ..... Positive     No results found for this or any previous visit (from the past 168 hour(s)).  Albumin   Date Value Ref Range Status   06/05/2020 3.9 3.4 - 4.9 g/dL Final   03/24/2020 4.0 3.4 - 4.9 g/dL Final   09/26/2019 3.4 3.4 - 5.0 g/dL Final   08/10/2019 3.2 (L) 3.4 - 5.0 g/dL Final   06/01/2019 3.2 (L) 3.4 - 5.0 g/dL Final   04/18/2019 3.3 (L) 3.4 - 5.0 g/dL Final   03/02/2019 3.2 (L) 3.4 - 5.0 g/dL Final         URINE STUDIES  Recent Labs   Lab Test 06/05/20  1114 09/26/19  1617 06/01/19  1105 04/18/19  1745 03/02/19  1106   COLOR Yellow Yellow Yellow Yellow Yellow   APPEARANCE Other Clear Clear Clear Slightly Cloudy   URINEGLC Neg Negative Negative Negative Negative   URINEBILI Other Negative Negative Negative Negative   URINEKETONE Neg Negative Negative Negative Negative   SG Other 1.020 1.019 1.020 1.020   UBLD Neg Small* Small* Negative Small*   URINEPH  --  5.0 5.0 6.0 5.0   PROTEIN  --  Negative Negative Negative Negative   UROBILINOGEN 0.2  --   --   --   --    NITRITE Neg Negative Negative Negative Negative   LEUKEST Small Trace* Moderate* Large* Large*   RBCU  --  1 3* 3* 5*   WBCU  --  1 10* 29* 15*     CT Chest without IV contrast 6/2/2015  Result Impression     1. 8 mm nodule, with adjacent smaller nodules, in the left lower lobe has been stable since outside chest CT 04/21/2014. Recommend  follow-up imaging in 6-12 months.   2. Stable bilateral patchy ground glass opacities, likely due to shallow inspiration.   3. Mild right hilar adenopathy, which is likely reactive.     FINDINGS:  Stable 8 mm nodule in the superior segment of the left lower lobe; this nodule has been stable since outside chest CT 04/21/2014. The two surrounding smaller nodules are relatively stable in appearance since 12/01/2014 (these nodules were obscured on outside chest CT). No new pulmonary nodules. Stable scattered groundglass opacities in both lungs which may be due to shallow inspiration. Tiny calcified granuloma right lung apex. Trace amount of atelectasis in the lingula. Scattered mediastinal and left hilar lymph nodes are more prominent on today's exam, however remain within normal limits for size. New mild right hilar adenopathy, with largest node measuring approximately 1 cm in maximal diameter.     Electronically signed by:    MAGALIS Vargas -[id number] 02-Jun-2015 10:46        Mario Freeman MD [id number] 02-Jun-2015 10:46       CT lung 2018: Stable lung nodules and 1 mm non-obstructing stone at upper pole of left kidney    ASSESSMENT AND PLAN:  # GPA (dx 2013)  Presented with pulmonary and renal involvement. S/P Rituximab q6 month since 0813-6298 without flare of the disease. As she has been through a quiescent course, we agreed to space out and/or reduce the dosage of rituximab. Last dose of rituximab was Oct 2019.     At this time, listening to her and discussing with her, lower suspicion for relapse of disease. However, she did have a + PR3 on her last check 6/5/2020. At this time, will plan to repeat her labs as soon as able and pending results, CD19 + PR3 Titer, can determine need for redosing of Rituximab.     Pt staffed with Dr. Fernandes.     KERLINE Mackenzie MD  Fellow  [phone]      Attending Attestation  This patient Was evaluated by me, Mario Fernandes MD on August 20, 2020 IN CONJUNCTION WITH Dr Mackenzie.  Discussed with  Dr Mackenzie and agree with the findings and plan in this note.  I have reviewed  Medications and ROS.  Mario Fernandes MD  Division of Renal Disease and Hypertension  Pager: [phone]  August 21, 2020  8:49 AM              Again, thank you for allowing me to participate in the care of your patient.      Sincerely,    Mario Fernandes MD

## 2020-08-24 DIAGNOSIS — I77.6 VASCULITIS (H): ICD-10-CM

## 2020-08-24 DIAGNOSIS — I77.82 ANCA-ASSOCIATED VASCULITIS (H): ICD-10-CM

## 2020-08-24 LAB
ALBUMIN SERPL-MCNC: 3.4 G/DL (ref 3.4–5)
ALBUMIN UR-MCNC: NEGATIVE MG/DL
ANION GAP SERPL CALCULATED.3IONS-SCNC: 5 MMOL/L (ref 3–14)
APPEARANCE UR: CLEAR
BACTERIA #/AREA URNS HPF: ABNORMAL /HPF
BILIRUB UR QL STRIP: NEGATIVE
BUN SERPL-MCNC: 15 MG/DL (ref 7–30)
CALCIUM SERPL-MCNC: 8.5 MG/DL (ref 8.5–10.1)
CHLORIDE SERPL-SCNC: 108 MMOL/L (ref 94–109)
CO2 SERPL-SCNC: 27 MMOL/L (ref 20–32)
COLOR UR AUTO: YELLOW
CREAT SERPL-MCNC: 0.9 MG/DL (ref 0.52–1.04)
ERYTHROCYTE [DISTWIDTH] IN BLOOD BY AUTOMATED COUNT: 14.2 % (ref 10–15)
GFR SERPL CREATININE-BSD FRML MDRD: 64 ML/MIN/{1.73_M2}
GLUCOSE SERPL-MCNC: 126 MG/DL (ref 70–99)
GLUCOSE UR STRIP-MCNC: NEGATIVE MG/DL
HCT VFR BLD AUTO: 41.3 % (ref 35–47)
HGB BLD-MCNC: 13.1 G/DL (ref 11.7–15.7)
HGB UR QL STRIP: ABNORMAL
KETONES UR STRIP-MCNC: NEGATIVE MG/DL
LEUKOCYTE ESTERASE UR QL STRIP: ABNORMAL
MCH RBC QN AUTO: 29.3 PG (ref 26.5–33)
MCHC RBC AUTO-ENTMCNC: 31.7 G/DL (ref 31.5–36.5)
MCV RBC AUTO: 92 FL (ref 78–100)
NITRATE UR QL: NEGATIVE
NON-SQ EPI CELLS #/AREA URNS LPF: ABNORMAL /LPF
PH UR STRIP: 5.5 PH (ref 5–7)
PHOSPHATE SERPL-MCNC: 2.8 MG/DL (ref 2.5–4.5)
PLATELET # BLD AUTO: 294 10E9/L (ref 150–450)
POTASSIUM SERPL-SCNC: 4 MMOL/L (ref 3.4–5.3)
PROT UR-MCNC: 0.1 G/L
PROT/CREAT 24H UR: 0.07 G/G CR (ref 0–0.2)
RBC # BLD AUTO: 4.47 10E12/L (ref 3.8–5.2)
RBC #/AREA URNS AUTO: ABNORMAL /HPF
SODIUM SERPL-SCNC: 140 MMOL/L (ref 133–144)
SOURCE: ABNORMAL
SP GR UR STRIP: >1.03 (ref 1–1.03)
TSH SERPL DL<=0.005 MIU/L-ACNC: 0.47 MU/L (ref 0.4–4)
URNS CMNT MICRO: ABNORMAL
UROBILINOGEN UR STRIP-ACNC: 2 EU/DL (ref 0.2–1)
WBC # BLD AUTO: 8.9 10E9/L (ref 4–11)
WBC #/AREA URNS AUTO: ABNORMAL /HPF

## 2020-08-24 PROCEDURE — 83876 ASSAY MYELOPEROXIDASE: CPT | Performed by: INTERNAL MEDICINE

## 2020-08-24 PROCEDURE — 36415 COLL VENOUS BLD VENIPUNCTURE: CPT | Performed by: INTERNAL MEDICINE

## 2020-08-24 PROCEDURE — 83516 IMMUNOASSAY NONANTIBODY: CPT | Performed by: INTERNAL MEDICINE

## 2020-08-24 PROCEDURE — 81001 URINALYSIS AUTO W/SCOPE: CPT | Performed by: INTERNAL MEDICINE

## 2020-08-24 PROCEDURE — 86355 B CELLS TOTAL COUNT: CPT | Performed by: INTERNAL MEDICINE

## 2020-08-24 PROCEDURE — 84156 ASSAY OF PROTEIN URINE: CPT | Performed by: INTERNAL MEDICINE

## 2020-08-24 PROCEDURE — 85027 COMPLETE CBC AUTOMATED: CPT | Performed by: INTERNAL MEDICINE

## 2020-08-24 PROCEDURE — 80069 RENAL FUNCTION PANEL: CPT | Performed by: INTERNAL MEDICINE

## 2020-08-24 PROCEDURE — 84443 ASSAY THYROID STIM HORMONE: CPT | Performed by: INTERNAL MEDICINE

## 2020-08-25 LAB
CD19 CELLS # BLD: 376 CELLS/UL (ref 107–698)
CD19 CELLS NFR BLD: 13 % (ref 6–27)
MYELOPEROXIDASE AB SER-ACNC: <0.2 AI (ref 0–0.9)
PROTEINASE3 IGG SER-ACNC: >8 AI (ref 0–0.9)

## 2020-09-24 ENCOUNTER — MYC REFILL (OUTPATIENT)
Dept: NEPHROLOGY | Facility: CLINIC | Age: 70
End: 2020-09-24

## 2020-09-24 DIAGNOSIS — Z29.89 NEED FOR PNEUMOCYSTIS PROPHYLAXIS: ICD-10-CM

## 2020-10-15 RX ORDER — SULFAMETHOXAZOLE/TRIMETHOPRIM 800-160 MG
TABLET ORAL
Qty: 12 TABLET | Refills: 2 | Status: SHIPPED | OUTPATIENT
Start: 2020-10-15

## 2021-01-03 ENCOUNTER — HEALTH MAINTENANCE LETTER (OUTPATIENT)
Age: 71
End: 2021-01-03

## 2021-03-30 ENCOUNTER — MYC MEDICAL ADVICE (OUTPATIENT)
Dept: NEPHROLOGY | Facility: CLINIC | Age: 71
End: 2021-03-30

## 2021-04-13 NOTE — TELEPHONE ENCOUNTER
Called and spoke with pt regarding not being able to see Dr. Fernandes as she is still in [place].  Pt was encouraged to ask questions of the providers that are there.  Will discuss with Dr. Fernandes, she just wants to know if she could be flaring because her b cells are still depleted? Will rituximab help if she is flaring?  Her GFR has decreased to 50, from normal before. She is just concerned that her overall kidney function.      Ailyn Gonzalez RN  [facility]

## 2021-04-14 NOTE — TELEPHONE ENCOUNTER
Discussed with Dr. Fernandes, discussed questions to give her to ask her providers locally.  Discussed with pt. She feels like she has the questions to ask now. She has put off her infusion until after she see her provider.      Ailyn Gonzalez RN  [facility]

## 2021-04-25 ENCOUNTER — HEALTH MAINTENANCE LETTER (OUTPATIENT)
Age: 71
End: 2021-04-25

## 2021-10-10 ENCOUNTER — HEALTH MAINTENANCE LETTER (OUTPATIENT)
Age: 71
End: 2021-10-10

## 2022-05-21 ENCOUNTER — HEALTH MAINTENANCE LETTER (OUTPATIENT)
Age: 72
End: 2022-05-21

## 2022-06-02 ENCOUNTER — OFFICE VISIT (OUTPATIENT)
Dept: URBAN - METROPOLITAN AREA CLINIC 44 | Facility: CLINIC | Age: 72
End: 2022-06-02
Payer: MEDICARE

## 2022-06-02 DIAGNOSIS — D23.10 OTHER BENIGN NEOPLASM OF SKIN OF EYELID INCLUDING CANTHUS: ICD-10-CM

## 2022-06-02 DIAGNOSIS — H25.13 AGE-RELATED NUCLEAR CATARACT, BILATERAL: ICD-10-CM

## 2022-06-02 DIAGNOSIS — H52.4 PRESBYOPIA: ICD-10-CM

## 2022-06-02 DIAGNOSIS — H04.123 TEAR FILM INSUFFICIENCY OF BILATERAL LACRIMAL GLANDS: Primary | ICD-10-CM

## 2022-06-02 DIAGNOSIS — H40.013 OPEN ANGLE WITH BORDERLINE FINDINGS, LOW RISK, BILATERAL: ICD-10-CM

## 2022-06-02 DIAGNOSIS — H43.812 VITREOUS DEGENERATION, LEFT EYE: ICD-10-CM

## 2022-06-02 PROCEDURE — 99204 OFFICE O/P NEW MOD 45 MIN: CPT | Performed by: OPTOMETRIST

## 2022-06-02 ASSESSMENT — VISUAL ACUITY
OD: 20/20
OS: 20/20

## 2022-06-02 ASSESSMENT — KERATOMETRY
OD: 42.75
OS: 43.25

## 2022-06-02 ASSESSMENT — INTRAOCULAR PRESSURE
OD: 12
OS: 12

## 2022-06-02 NOTE — IMPRESSION/PLAN
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 Plan: Two lesion RLL. Nasal lesion may be suspicious for BCC. Recommend see Dr. Brenda Morris for further evaluation.

## 2022-06-02 NOTE — IMPRESSION/PLAN
Impression: Open angle with borderline findings, low risk, bilateral: H40.013. Plan: Disc asymmetry OS>OD
IOP: 12/12 Optos taken 06/02/22 Did not start gtts today.   RTC 6 months for IOP + pachymetry + OCT RNFL/GCA + 24-2 HVF

## 2022-06-27 ENCOUNTER — APPOINTMENT (OUTPATIENT)
Age: 72
Setting detail: DERMATOLOGY
End: 2022-06-28

## 2022-06-27 VITALS — DIASTOLIC BLOOD PRESSURE: 80 MMHG | SYSTOLIC BLOOD PRESSURE: 142 MMHG

## 2022-06-27 DIAGNOSIS — L82.1 OTHER SEBORRHEIC KERATOSIS: ICD-10-CM

## 2022-06-27 DIAGNOSIS — R03.0 ELEVATED BLOOD-PRESSURE READING, WITHOUT DIAGNOSIS OF HYPERTENSION: ICD-10-CM

## 2022-06-27 DIAGNOSIS — D485 NEOPLASM OF UNCERTAIN BEHAVIOR OF SKIN: ICD-10-CM

## 2022-06-27 PROBLEM — D48.5 NEOPLASM OF UNCERTAIN BEHAVIOR OF SKIN: Status: ACTIVE | Noted: 2022-06-27

## 2022-06-27 PROCEDURE — 99203 OFFICE O/P NEW LOW 30 MIN: CPT | Mod: 25

## 2022-06-27 PROCEDURE — OTHER TREATMENT REGIMEN: OTHER

## 2022-06-27 PROCEDURE — 11402 EXC TR-EXT B9+MARG 1.1-2 CM: CPT

## 2022-06-27 PROCEDURE — OTHER PHOTO-DOCUMENTATION: OTHER

## 2022-06-27 PROCEDURE — OTHER COUNSELING: OTHER

## 2022-06-27 PROCEDURE — OTHER MIPS QUALITY: OTHER

## 2022-06-27 PROCEDURE — 12032 INTMD RPR S/A/T/EXT 2.6-7.5: CPT

## 2022-06-27 PROCEDURE — OTHER EXCISION: OTHER

## 2022-06-27 ASSESSMENT — LOCATION ZONE DERM
LOCATION ZONE: FACE
LOCATION ZONE: ARM
LOCATION ZONE: TRUNK

## 2022-06-27 ASSESSMENT — LOCATION DETAILED DESCRIPTION DERM
LOCATION DETAILED: LEFT MEDIAL SUPERIOR CHEST
LOCATION DETAILED: RIGHT MEDIAL TEMPLE
LOCATION DETAILED: RIGHT VENTRAL LATERAL PROXIMAL FOREARM
LOCATION DETAILED: RIGHT LATERAL EYEBROW

## 2022-06-27 ASSESSMENT — LOCATION SIMPLE DESCRIPTION DERM
LOCATION SIMPLE: RIGHT FOREARM
LOCATION SIMPLE: RIGHT TEMPLE
LOCATION SIMPLE: RIGHT EYEBROW
LOCATION SIMPLE: CHEST

## 2022-06-27 NOTE — PROCEDURE: MIPS QUALITY
Quality 317: Preventative Care And Screening: Screening For High Blood Pressure And Follow-Up Documented: Elevated or hypertensive blood pressure reading documented, and the indicated follow-up is documented
Quality 47: Advance Care Plan: Advance Care Planning discussed and documented; advance care plan or surrogate decision maker documented in the medical record.
Detail Level: Detailed
Quality 110: Preventive Care And Screening: Influenza Immunization: Influenza Immunization previously received during influenza season
Quality 226: Preventive Care And Screening: Tobacco Use: Screening And Cessation Intervention: Patient screened for tobacco use and is an ex/non-smoker
Quality 130: Documentation Of Current Medications In The Medical Record: Current Medications Documented
Quality 111:Pneumonia Vaccination Status For Older Adults: Pneumococcal vaccine administered on or after patient’s 60th birthday and before the end of the measurement period

## 2022-06-27 NOTE — PROCEDURE: EXCISION
Include Z78.9 (Other Specified Conditions Influencing Health Status) As An Associated Diagnosis?: No
Complex Repair And Double M Plasty Text: The defect edges were debeveled with a #15 scalpel blade.  The primary defect was closed partially with a complex linear closure.  Given the location of the remaining defect, shape of the defect and the proximity to free margins a double M plasty was deemed most appropriate for complete closure of the defect.  Using a sterile surgical marker, an appropriate advancement flap was drawn incorporating the defect and placing the expected incisions within the relaxed skin tension lines where possible.    The area thus outlined was incised deep to adipose tissue with a #15 scalpel blade.  The skin margins were undermined to an appropriate distance in all directions utilizing iris scissors.
Bi-Rhombic Flap Text: The defect edges were debeveled with a #15 scalpel blade.  Given the location of the defect and the proximity to free margins a bi-rhombic flap was deemed most appropriate.  Using a sterile surgical marker, an appropriate rhombic flap was drawn incorporating the defect. The area thus outlined was incised deep to adipose tissue with a #15 scalpel blade.  The skin margins were undermined to an appropriate distance in all directions utilizing iris scissors.
Suturegard Intro: Intraoperative tissue expansion was performed, utilizing the SUTUREGARD device, in order to reduce wound tension.
Complex Repair And Rotation Flap Text: The defect edges were debeveled with a #15 scalpel blade.  The primary defect was closed partially with a complex linear closure.  Given the location of the remaining defect, shape of the defect and the proximity to free margins a rotation flap was deemed most appropriate for complete closure of the defect.  Using a sterile surgical marker, an appropriate advancement flap was drawn incorporating the defect and placing the expected incisions within the relaxed skin tension lines where possible.    The area thus outlined was incised deep to adipose tissue with a #15 scalpel blade.  The skin margins were undermined to an appropriate distance in all directions utilizing iris scissors.
Home Suture Removal Text: Patient was provided a home suture removal kit and will remove their sutures at home.  If they have any questions or difficulties they will call the office.
Dressing: pressure dressing with telfa
Zygomaticofacial Flap Text: Given the location of the defect, shape of the defect and the proximity to free margins a zygomaticofacial flap was deemed most appropriate for repair.  Using a sterile surgical marker, the appropriate flap was drawn incorporating the defect and placing the expected incisions within the relaxed skin tension lines where possible. The area thus outlined was incised deep to adipose tissue with a #15 scalpel blade with preservation of a vascular pedicle.  The skin margins were undermined to an appropriate distance in all directions utilizing iris scissors.  The flap was then placed into the defect and anchored with interrupted buried subcutaneous sutures.
Lip Wedge Excision Repair Text: Given the location of the defect and the proximity to free margins a full thickness wedge repair was deemed most appropriate.  Using a sterile surgical marker, the appropriate repair was drawn incorporating the defect and placing the expected incisions perpendicular to the vermilion border.  The vermilion border was also meticulously outlined to ensure appropriate reapproximation during the repair.  The area thus outlined was incised through and through with a #15 scalpel blade.  The muscularis and dermis were reaproximated with deep sutures following hemostasis. Care was taken to realign the vermilion border before proceeding with the superficial closure.  Once the vermilion was realigned the superfical and mucosal closure was finished.
Secondary Defect Length (In Cm): 0
Xenograft Text: The defect edges were debeveled with a #15 scalpel blade.  Given the location of the defect, shape of the defect and the proximity to free margins a xenograft was deemed most appropriate.  The graft was then trimmed to fit the size of the defect.  The graft was then placed in the primary defect and oriented appropriately.
Repair Performed By Another Provider Text (Leave Blank If You Do Not Want): After the tissue was excised the defect was repaired by another provider.
O-Z Flap Text: The defect edges were debeveled with a #15 scalpel blade.  Given the location of the defect, shape of the defect and the proximity to free margins an O-Z flap was deemed most appropriate.  Using a sterile surgical marker, an appropriate transposition flap was drawn incorporating the defect and placing the expected incisions within the relaxed skin tension lines where possible. The area thus outlined was incised deep to adipose tissue with a #15 scalpel blade.  The skin margins were undermined to an appropriate distance in all directions utilizing iris scissors.
Complex Repair And O-T Advancement Flap Text: The defect edges were debeveled with a #15 scalpel blade.  The primary defect was closed partially with a complex linear closure.  Given the location of the remaining defect, shape of the defect and the proximity to free margins an O-T advancement flap was deemed most appropriate for complete closure of the defect.  Using a sterile surgical marker, an appropriate advancement flap was drawn incorporating the defect and placing the expected incisions within the relaxed skin tension lines where possible.    The area thus outlined was incised deep to adipose tissue with a #15 scalpel blade.  The skin margins were undermined to an appropriate distance in all directions utilizing iris scissors.
Epidermal Closure Graft Donor Site (Optional): simple interrupted
Complex Repair And W Plasty Text: The defect edges were debeveled with a #15 scalpel blade.  The primary defect was closed partially with a complex linear closure.  Given the location of the remaining defect, shape of the defect and the proximity to free margins a W plasty was deemed most appropriate for complete closure of the defect.  Using a sterile surgical marker, an appropriate advancement flap was drawn incorporating the defect and placing the expected incisions within the relaxed skin tension lines where possible.    The area thus outlined was incised deep to adipose tissue with a #15 scalpel blade.  The skin margins were undermined to an appropriate distance in all directions utilizing iris scissors.
Island Pedicle Flap With Canthal Suspension Text: The defect edges were debeveled with a #15 scalpel blade.  Given the location of the defect, shape of the defect and the proximity to free margins an island pedicle advancement flap was deemed most appropriate.  Using a sterile surgical marker, an appropriate advancement flap was drawn incorporating the defect, outlining the appropriate donor tissue and placing the expected incisions within the relaxed skin tension lines where possible. The area thus outlined was incised deep to adipose tissue with a #15 scalpel blade.  The skin margins were undermined to an appropriate distance in all directions around the primary defect and laterally outward around the island pedicle utilizing iris scissors.  There was minimal undermining beneath the pedicle flap. A suspension suture was placed in the canthal tendon to prevent tension and prevent ectropion.
Billing Type: Third-Party Bill
Complex Repair And Split-Thickness Skin Graft Text: The defect edges were debeveled with a #15 scalpel blade.  The primary defect was closed partially with a complex linear closure.  Given the location of the defect, shape of the defect and the proximity to free margins a split thickness skin graft was deemed most appropriate to repair the remaining defect.  The graft was trimmed to fit the size of the remaining defect.  The graft was then placed in the primary defect, oriented appropriately, and sutured into place.
Bilobed Transposition Flap Text: The defect edges were debeveled with a #15 scalpel blade.  Given the location of the defect and the proximity to free margins a bilobed transposition flap was deemed most appropriate.  Using a sterile surgical marker, an appropriate bilobe flap drawn around the defect.    The area thus outlined was incised deep to adipose tissue with a #15 scalpel blade.  The skin margins were undermined to an appropriate distance in all directions utilizing iris scissors.
No Repair - Repaired With Adjacent Surgical Defect Text (Leave Blank If You Do Not Want): After the excision the defect was repaired concurrently with another surgical defect which was in close approximation.
Show Pathology Comment Variable: Yes
Double O-Z Flap Text: The defect edges were debeveled with a #15 scalpel blade.  Given the location of the defect, shape of the defect and the proximity to free margins a Double O-Z flap was deemed most appropriate.  Using a sterile surgical marker, an appropriate transposition flap was drawn incorporating the defect and placing the expected incisions within the relaxed skin tension lines where possible. The area thus outlined was incised deep to adipose tissue with a #15 scalpel blade.  The skin margins were undermined to an appropriate distance in all directions utilizing iris scissors.
Peng Advancement Flap Text: The defect edges were debeveled with a #15 scalpel blade.  Given the location of the defect, shape of the defect and the proximity to free margins a Peng advancement flap was deemed most appropriate.  Using a sterile surgical marker, an appropriate advancement flap was drawn incorporating the defect and placing the expected incisions within the relaxed skin tension lines where possible. The area thus outlined was incised deep to adipose tissue with a #15 scalpel blade.  The skin margins were undermined to an appropriate distance in all directions utilizing iris scissors.
Undermining Type: Entire Wound
Complex Repair And Xenograft Text: The defect edges were debeveled with a #15 scalpel blade.  The primary defect was closed partially with a complex linear closure.  Given the location of the defect, shape of the defect and the proximity to free margins a xenograft was deemed most appropriate to repair the remaining defect.  The graft was trimmed to fit the size of the remaining defect.  The graft was then placed in the primary defect, oriented appropriately, and sutured into place.
Alar Island Pedicle Flap Text: The defect edges were debeveled with a #15 scalpel blade.  Given the location of the defect, shape of the defect and the proximity to the alar rim an island pedicle advancement flap was deemed most appropriate.  Using a sterile surgical marker, an appropriate advancement flap was drawn incorporating the defect, outlining the appropriate donor tissue and placing the expected incisions within the nasal ala running parallel to the alar rim. The area thus outlined was incised with a #15 scalpel blade.  The skin margins were undermined minimally to an appropriate distance in all directions around the primary defect and laterally outward around the island pedicle utilizing iris scissors.  There was minimal undermining beneath the pedicle flap.
M-Plasty Complex Repair Preamble Text (Leave Blank If You Do Not Want): Extensive wide undermining was performed.
Repair Type: Intermediate
Hemostasis: Electrocautery
H Plasty Text: Given the location of the defect, shape of the defect and the proximity to free margins a H-plasty was deemed most appropriate for repair.  Using a sterile surgical marker, the appropriate advancement arms of the H-plasty were drawn incorporating the defect and placing the expected incisions within the relaxed skin tension lines where possible. The area thus outlined was incised deep to adipose tissue with a #15 scalpel blade. The skin margins were undermined to an appropriate distance in all directions utilizing iris scissors.  The opposing advancement arms were then advanced into place in opposite direction and anchored with interrupted buried subcutaneous sutures.
Primary Defect Length (In Cm): 1.5
Slit Excision Additional Text (Leave Blank If You Do Not Want): A linear line was drawn on the skin overlying the lesion. An incision was made slowly until the lesion was visualized.  Once visualized, the lesion was removed with blunt dissection.
Crescentic Advancement Flap Text: The defect edges were debeveled with a #15 scalpel blade.  Given the location of the defect and the proximity to free margins a crescentic advancement flap was deemed most appropriate.  Using a sterile surgical marker, the appropriate advancement flap was drawn incorporating the defect and placing the expected incisions within the relaxed skin tension lines where possible.    The area thus outlined was incised deep to adipose tissue with a #15 scalpel blade.  The skin margins were undermined to an appropriate distance in all directions utilizing iris scissors.
Complex Repair And Double Advancement Flap Text: The defect edges were debeveled with a #15 scalpel blade.  The primary defect was closed partially with a complex linear closure.  Given the location of the remaining defect, shape of the defect and the proximity to free margins a double advancement flap was deemed most appropriate for complete closure of the defect.  Using a sterile surgical marker, an appropriate advancement flap was drawn incorporating the defect and placing the expected incisions within the relaxed skin tension lines where possible.    The area thus outlined was incised deep to adipose tissue with a #15 scalpel blade.  The skin margins were undermined to an appropriate distance in all directions utilizing iris scissors.
Medical Necessity Information: It is in your best interest to select a reason for this procedure from the list below. All of these items fulfill various CMS LCD requirements except lesion extends to a margin.
Path Notes (To The Dermatopathologist): Please check margins.  Cyst.
Interpolation Flap Text: A decision was made to reconstruct the defect utilizing an interpolation axial flap and a staged reconstruction.  A telfa template was made of the defect.  This telfa template was then used to outline the interpolation flap.  The donor area for the pedicle flap was then injected with anesthesia.  The flap was excised through the skin and subcutaneous tissue down to the layer of the underlying musculature.  The interpolation flap was carefully excised within this deep plane to maintain its blood supply.  The edges of the donor site were undermined.   The donor site was closed in a primary fashion.  The pedicle was then rotated into position and sutured.  Once the tube was sutured into place, adequate blood supply was confirmed with blanching and refill.  The pedicle was then wrapped with xeroform gauze and dressed appropriately with a telfa and gauze bandage to ensure continued blood supply and protect the attached pedicle.
Anesthesia Volume In Cc: 4
Complex Repair And Dorsal Nasal Flap Text: The defect edges were debeveled with a #15 scalpel blade.  The primary defect was closed partially with a complex linear closure.  Given the location of the remaining defect, shape of the defect and the proximity to free margins a dorsal nasal flap was deemed most appropriate for complete closure of the defect.  Using a sterile surgical marker, an appropriate flap was drawn incorporating the defect and placing the expected incisions within the relaxed skin tension lines where possible.    The area thus outlined was incised deep to adipose tissue with a #15 scalpel blade.  The skin margins were undermined to an appropriate distance in all directions utilizing iris scissors.
Mustarde Flap Text: The defect edges were debeveled with a #15 scalpel blade.  Given the size, depth and location of the defect and the proximity to free margins a Mustarde flap was deemed most appropriate.  Using a sterile surgical marker, an appropriate flap was drawn incorporating the defect. The area thus outlined was incised with a #15 scalpel blade.  The skin margins were undermined to an appropriate distance in all directions utilizing iris scissors.
Complex Repair And Single Advancement Flap Text: The defect edges were debeveled with a #15 scalpel blade.  The primary defect was closed partially with a complex linear closure.  Given the location of the remaining defect, shape of the defect and the proximity to free margins a single advancement flap was deemed most appropriate for complete closure of the defect.  Using a sterile surgical marker, an appropriate advancement flap was drawn incorporating the defect and placing the expected incisions within the relaxed skin tension lines where possible.    The area thus outlined was incised deep to adipose tissue with a #15 scalpel blade.  The skin margins were undermined to an appropriate distance in all directions utilizing iris scissors.
Ear Star Wedge Flap Text: The defect edges were debeveled with a #15 blade scalpel.  Given the location of the defect and the proximity to free margins (helical rim) an ear star wedge flap was deemed most appropriate.  Using a sterile surgical marker, the appropriate flap was drawn incorporating the defect and placing the expected incisions between the helical rim and antihelix where possible.  The area thus outlined was incised through and through with a #15 scalpel blade.
W Plasty Text: The lesion was extirpated to the level of the fat with a #15 scalpel blade.  Given the location of the defect, shape of the defect and the proximity to free margins a W-plasty was deemed most appropriate for repair.  Using a sterile surgical marker, the appropriate transposition arms of the W-plasty were drawn incorporating the defect and placing the expected incisions within the relaxed skin tension lines where possible.    The area thus outlined was incised deep to adipose tissue with a #15 scalpel blade.  The skin margins were undermined to an appropriate distance in all directions utilizing iris scissors.  The opposing transposition arms were then transposed into place in opposite direction and anchored with interrupted buried subcutaneous sutures.
Where Do You Want The Question To Include Opioid Counseling Located?: Case Summary Tab
Complex Repair And Modified Advancement Flap Text: The defect edges were debeveled with a #15 scalpel blade.  The primary defect was closed partially with a complex linear closure.  Given the location of the remaining defect, shape of the defect and the proximity to free margins a modified advancement flap was deemed most appropriate for complete closure of the defect.  Using a sterile surgical marker, an appropriate advancement flap was drawn incorporating the defect and placing the expected incisions within the relaxed skin tension lines where possible.    The area thus outlined was incised deep to adipose tissue with a #15 scalpel blade.  The skin margins were undermined to an appropriate distance in all directions utilizing iris scissors.
Complex Repair And M Plasty Text: The defect edges were debeveled with a #15 scalpel blade.  The primary defect was closed partially with a complex linear closure.  Given the location of the remaining defect, shape of the defect and the proximity to free margins an M plasty was deemed most appropriate for complete closure of the defect.  Using a sterile surgical marker, an appropriate advancement flap was drawn incorporating the defect and placing the expected incisions within the relaxed skin tension lines where possible.    The area thus outlined was incised deep to adipose tissue with a #15 scalpel blade.  The skin margins were undermined to an appropriate distance in all directions utilizing iris scissors.
Dorsal Nasal Flap Text: The defect edges were debeveled with a #15 scalpel blade.  Given the location of the defect and the proximity to free margins a dorsal nasal flap was deemed most appropriate.  Using a sterile surgical marker, an appropriate dorsal nasal flap was drawn around the defect.    The area thus outlined was incised deep to adipose tissue with a #15 scalpel blade.  The skin margins were undermined to an appropriate distance in all directions utilizing iris scissors.
Rhomboid Transposition Flap Text: The defect edges were debeveled with a #15 scalpel blade.  Given the location of the defect and the proximity to free margins a rhomboid transposition flap was deemed most appropriate.  Using a sterile surgical marker, an appropriate rhomboid flap was drawn incorporating the defect.    The area thus outlined was incised deep to adipose tissue with a #15 scalpel blade.  The skin margins were undermined to an appropriate distance in all directions utilizing iris scissors.
Advancement Flap (Double) Text: The defect edges were debeveled with a #15 scalpel blade.  Given the location of the defect and the proximity to free margins a double advancement flap was deemed most appropriate.  Using a sterile surgical marker, the appropriate advancement flaps were drawn incorporating the defect and placing the expected incisions within the relaxed skin tension lines where possible.    The area thus outlined was incised deep to adipose tissue with a #15 scalpel blade.  The skin margins were undermined to an appropriate distance in all directions utilizing iris scissors.
Additional Anesthesia Volume In Cc: 6
Graft Donor Site Bandage (Optional-Leave Blank If You Don't Want In Note): Steri-strips and a pressure bandage were applied to the donor site.
Complex Repair And Ftsg Text: The defect edges were debeveled with a #15 scalpel blade.  The primary defect was closed partially with a complex linear closure.  Given the location of the defect, shape of the defect and the proximity to free margins a full thickness skin graft was deemed most appropriate to repair the remaining defect.  The graft was trimmed to fit the size of the remaining defect.  The graft was then placed in the primary defect, oriented appropriately, and sutured into place.
Nasal Turnover Hinge Flap Text: The defect edges were debeveled with a #15 scalpel blade.  Given the size, depth, location of the defect and the defect being full thickness a nasal turnover hinge flap was deemed most appropriate.  Using a sterile surgical marker, an appropriate hinge flap was drawn incorporating the defect. The area thus outlined was incised with a #15 scalpel blade. The flap was designed to recreate the nasal mucosal lining and the alar rim. The skin margins were undermined to an appropriate distance in all directions utilizing iris scissors.
Estlander Flap (Lower To Upper Lip) Text: The defect of the lower lip was assessed and measured.  Given the location and size of the defect, an Estlander flap was deemed most appropriate.  Using a sterile surgical marker, an appropriate Estlander flap was measured and drawn on the upper lip. Local anesthesia was then infiltrated. A scalpel was then used to incise the lateral aspect of the flap, through skin, muscle and mucosa, leaving the flap pedicled medially.  The flap was then rotated and positioned to fill the lower lip defect.  The flap was then sutured into place with a three layer technique, closing the orbicularis oris muscle layer with subcutaneous buried sutures, followed by a mucosal layer and an epidermal layer.
Eye Clamp Note Details: An eye clamp was used during the procedure.
Z Plasty Text: The lesion was extirpated to the level of the fat with a #15 scalpel blade.  Given the location of the defect, shape of the defect and the proximity to free margins a Z-plasty was deemed most appropriate for repair.  Using a sterile surgical marker, the appropriate transposition arms of the Z-plasty were drawn incorporating the defect and placing the expected incisions within the relaxed skin tension lines where possible.    The area thus outlined was incised deep to adipose tissue with a #15 scalpel blade.  The skin margins were undermined to an appropriate distance in all directions utilizing iris scissors.  The opposing transposition arms were then transposed into place in opposite direction and anchored with interrupted buried subcutaneous sutures.
Tissue Cultured Epidermal Autograft Text: The defect edges were debeveled with a #15 scalpel blade.  Given the location of the defect, shape of the defect and the proximity to free margins a tissue cultured epidermal autograft was deemed most appropriate.  The graft was then trimmed to fit the size of the defect.  The graft was then placed in the primary defect and oriented appropriately.
O-L Flap Text: The defect edges were debeveled with a #15 scalpel blade.  Given the location of the defect, shape of the defect and the proximity to free margins an O-L flap was deemed most appropriate.  Using a sterile surgical marker, an appropriate advancement flap was drawn incorporating the defect and placing the expected incisions within the relaxed skin tension lines where possible.    The area thus outlined was incised deep to adipose tissue with a #15 scalpel blade.  The skin margins were undermined to an appropriate distance in all directions utilizing iris scissors.
Island Pedicle Flap Text: The defect edges were debeveled with a #15 scalpel blade.  Given the location of the defect, shape of the defect and the proximity to free margins an island pedicle advancement flap was deemed most appropriate.  Using a sterile surgical marker, an appropriate advancement flap was drawn incorporating the defect, outlining the appropriate donor tissue and placing the expected incisions within the relaxed skin tension lines where possible.    The area thus outlined was incised deep to adipose tissue with a #15 scalpel blade.  The skin margins were undermined to an appropriate distance in all directions around the primary defect and laterally outward around the island pedicle utilizing iris scissors.  There was minimal undermining beneath the pedicle flap.
Suturegard Retention Suture: 2-0 Nylon
Modified Advancement Flap Text: The defect edges were debeveled with a #15 scalpel blade.  Given the location of the defect, shape of the defect and the proximity to free margins a modified advancement flap was deemed most appropriate.  Using a sterile surgical marker, an appropriate advancement flap was drawn incorporating the defect and placing the expected incisions within the relaxed skin tension lines where possible.    The area thus outlined was incised deep to adipose tissue with a #15 scalpel blade.  The skin margins were undermined to an appropriate distance in all directions utilizing iris scissors.
Retention Suture Bite Size: 3 mm
Saucerization Depth: dermis and superficial adipose tissue
Consent was obtained from the patient. The risks and benefits to therapy were discussed in detail. Specifically, the risks of infection, scarring, bleeding, prolonged wound healing, incomplete removal, allergy to anesthesia, nerve injury and recurrence were addressed. Prior to the procedure, the treatment site was clearly identified and confirmed by the patient. All components of Universal Protocol/PAUSE Rule completed.
Paramedian Forehead Flap Text: A decision was made to reconstruct the defect utilizing an interpolation axial flap and a staged reconstruction.  A telfa template was made of the defect.  This telfa template was then used to outline the paramedian forehead pedicle flap.  The donor area for the pedicle flap was then injected with anesthesia.  The flap was excised through the skin and subcutaneous tissue down to the layer of the underlying musculature.  The pedicle flap was carefully excised within this deep plane to maintain its blood supply.  The edges of the donor site were undermined.   The donor site was closed in a primary fashion.  The pedicle was then rotated into position and sutured.  Once the tube was sutured into place, adequate blood supply was confirmed with blanching and refill.  The pedicle was then wrapped with xeroform gauze and dressed appropriately with a telfa and gauze bandage to ensure continued blood supply and protect the attached pedicle.
Composite Graft Text: The defect edges were debeveled with a #15 scalpel blade.  Given the location of the defect, shape of the defect, the proximity to free margins and the fact the defect was full thickness a composite graft was deemed most appropriate.  The defect was outline and then transferred to the donor site.  A full thickness graft was then excised from the donor site. The graft was then placed in the primary defect, oriented appropriately and then sutured into place.  The secondary defect was then repaired using a primary closure.
Surgeon (Optional): Nesha James MD
Melolabial Transposition Flap Text: The defect edges were debeveled with a #15 scalpel blade.  Given the location of the defect and the proximity to free margins a melolabial flap was deemed most appropriate.  Using a sterile surgical marker, an appropriate melolabial transposition flap was drawn incorporating the defect.    The area thus outlined was incised deep to adipose tissue with a #15 scalpel blade.  The skin margins were undermined to an appropriate distance in all directions utilizing iris scissors.
O-T Plasty Text: The defect edges were debeveled with a #15 scalpel blade.  Given the location of the defect, shape of the defect and the proximity to free margins an O-T plasty was deemed most appropriate.  Using a sterile surgical marker, an appropriate O-T plasty was drawn incorporating the defect and placing the expected incisions within the relaxed skin tension lines where possible.    The area thus outlined was incised deep to adipose tissue with a #15 scalpel blade.  The skin margins were undermined to an appropriate distance in all directions utilizing iris scissors.
Positioning (Leave Blank If You Do Not Want): The patient was placed in a comfortable position exposing the surgical site.
Ftsg Text: The defect edges were debeveled with a #15 scalpel blade.  Given the location of the defect, shape of the defect and the proximity to free margins a full thickness skin graft was deemed most appropriate.  Using a sterile surgical marker, the primary defect shape was transferred to the donor site. The area thus outlined was incised deep to adipose tissue with a #15 scalpel blade.  The harvested graft was then trimmed of adipose tissue until only dermis and epidermis was left.  The skin margins of the secondary defect were undermined to an appropriate distance in all directions utilizing iris scissors.  The secondary defect was closed with interrupted buried subcutaneous sutures.  The skin edges were then re-apposed with running  sutures.  The skin graft was then placed in the primary defect and oriented appropriately.
Complex Repair And Z Plasty Text: The defect edges were debeveled with a #15 scalpel blade.  The primary defect was closed partially with a complex linear closure.  Given the location of the remaining defect, shape of the defect and the proximity to free margins a Z plasty was deemed most appropriate for complete closure of the defect.  Using a sterile surgical marker, an appropriate advancement flap was drawn incorporating the defect and placing the expected incisions within the relaxed skin tension lines where possible.    The area thus outlined was incised deep to adipose tissue with a #15 scalpel blade.  The skin margins were undermined to an appropriate distance in all directions utilizing iris scissors.
Deep Sutures: 4-0 PGA
Cheek-To-Nose Interpolation Flap Text: A decision was made to reconstruct the defect utilizing an interpolation axial flap and a staged reconstruction.  A telfa template was made of the defect.  This telfa template was then used to outline the Cheek-To-Nose Interpolation flap.  The donor area for the pedicle flap was then injected with anesthesia.  The flap was excised through the skin and subcutaneous tissue down to the layer of the underlying musculature.  The interpolation flap was carefully excised within this deep plane to maintain its blood supply.  The edges of the donor site were undermined.   The donor site was closed in a primary fashion.  The pedicle was then rotated into position and sutured.  Once the tube was sutured into place, adequate blood supply was confirmed with blanching and refill.  The pedicle was then wrapped with xeroform gauze and dressed appropriately with a telfa and gauze bandage to ensure continued blood supply and protect the attached pedicle.
Purse String (Simple) Text: Given the location of the defect and the characteristics of the surrounding skin a purse string simple closure was deemed most appropriate.  Undermining was performed circumferentially around the surgical defect.  A purse string suture was then placed and tightened.
Bilateral Helical Rim Advancement Flap Text: The defect edges were debeveled with a #15 blade scalpel.  Given the location of the defect and the proximity to free margins (helical rim) a bilateral helical rim advancement flap was deemed most appropriate.  Using a sterile surgical marker, the appropriate advancement flaps were drawn incorporating the defect and placing the expected incisions between the helical rim and antihelix where possible.  The area thus outlined was incised through and through with a #15 scalpel blade.  With a skin hook and iris scissors, the flaps were gently and sharply undermined and freed up.
Complex Repair And V-Y Plasty Text: The defect edges were debeveled with a #15 scalpel blade.  The primary defect was closed partially with a complex linear closure.  Given the location of the remaining defect, shape of the defect and the proximity to free margins a V-Y plasty was deemed most appropriate for complete closure of the defect.  Using a sterile surgical marker, an appropriate advancement flap was drawn incorporating the defect and placing the expected incisions within the relaxed skin tension lines where possible.    The area thus outlined was incised deep to adipose tissue with a #15 scalpel blade.  The skin margins were undermined to an appropriate distance in all directions utilizing iris scissors.
Debridement Text: The wound edges were debrided prior to proceeding with the closure to facilitate wound healing.
Star Wedge Flap Text: The defect edges were debeveled with a #15 scalpel blade.  Given the location of the defect, shape of the defect and the proximity to free margins a star wedge flap was deemed most appropriate.  Using a sterile surgical marker, an appropriate rotation flap was drawn incorporating the defect and placing the expected incisions within the relaxed skin tension lines where possible. The area thus outlined was incised deep to adipose tissue with a #15 scalpel blade.  The skin margins were undermined to an appropriate distance in all directions utilizing iris scissors.
O-Z Plasty Text: The defect edges were debeveled with a #15 scalpel blade.  Given the location of the defect, shape of the defect and the proximity to free margins an O-Z plasty (double transposition flap) was deemed most appropriate.  Using a sterile surgical marker, the appropriate transposition flaps were drawn incorporating the defect and placing the expected incisions within the relaxed skin tension lines where possible.    The area thus outlined was incised deep to adipose tissue with a #15 scalpel blade.  The skin margins were undermined to an appropriate distance in all directions utilizing iris scissors.  Hemostasis was achieved with electrocautery.  The flaps were then transposed into place, one clockwise and the other counterclockwise, and anchored with interrupted buried subcutaneous sutures.
Fusiform Excision Additional Text (Leave Blank If You Do Not Want): The margin was drawn around the clinically apparent lesion.  A fusiform shape was then drawn on the skin incorporating the lesion and margins.  Incisions were then made along these lines to the appropriate tissue plane and the lesion was extirpated.
Advancement Flap (Single) Text: The defect edges were debeveled with a #15 scalpel blade.  Given the location of the defect and the proximity to free margins a single advancement flap was deemed most appropriate.  Using a sterile surgical marker, an appropriate advancement flap was drawn incorporating the defect and placing the expected incisions within the relaxed skin tension lines where possible.    The area thus outlined was incised deep to adipose tissue with a #15 scalpel blade.  The skin margins were undermined to an appropriate distance in all directions utilizing iris scissors.
Number Of Hemigard Strips Per Side: 1
Information: Selecting Yes will display possible errors in your note based on the variables you have selected. This validation is only offered as a suggestion for you. PLEASE NOTE THAT THE VALIDATION TEXT WILL BE REMOVED WHEN YOU FINALIZE YOUR NOTE. IF YOU WANT TO FAX A PRELIMINARY NOTE YOU WILL NEED TO TOGGLE THIS TO 'NO' IF YOU DO NOT WANT IT IN YOUR FAXED NOTE.
Skin Substitute Text: The defect edges were debeveled with a #15 scalpel blade.  Given the location of the defect, shape of the defect and the proximity to free margins a skin substitute graft was deemed most appropriate.  The graft material was trimmed to fit the size of the defect. The graft was then placed in the primary defect and oriented appropriately.
Surgeon Performing Repair (Optional): Nesha James MD
Scalpel Size: 15 blade
Double O-Z Plasty Text: The defect edges were debeveled with a #15 scalpel blade.  Given the location of the defect, shape of the defect and the proximity to free margins a Double O-Z plasty (double transposition flap) was deemed most appropriate.  Using a sterile surgical marker, the appropriate transposition flaps were drawn incorporating the defect and placing the expected incisions within the relaxed skin tension lines where possible. The area thus outlined was incised deep to adipose tissue with a #15 scalpel blade.  The skin margins were undermined to an appropriate distance in all directions utilizing iris scissors.  Hemostasis was achieved with electrocautery.  The flaps were then transposed into place, one clockwise and the other counterclockwise, and anchored with interrupted buried subcutaneous sutures.
Length To Time In Minutes Device Was In Place: 10
Complex Repair And Melolabial Flap Text: The defect edges were debeveled with a #15 scalpel blade.  The primary defect was closed partially with a complex linear closure.  Given the location of the remaining defect, shape of the defect and the proximity to free margins a melolabial flap was deemed most appropriate for complete closure of the defect.  Using a sterile surgical marker, an appropriate advancement flap was drawn incorporating the defect and placing the expected incisions within the relaxed skin tension lines where possible.    The area thus outlined was incised deep to adipose tissue with a #15 scalpel blade.  The skin margins were undermined to an appropriate distance in all directions utilizing iris scissors.
Helical Rim Text: The closure involved the helical rim.
Post-Care Instructions: I reviewed with the patient in detail post-care instructions. Patient is not to engage in any heavy lifting, exercise, or swimming for the next 14 days. Should the patient develop any fevers, chills, bleeding, severe pain patient will contact the office immediately.
Anesthesia Type: 0.5% lidocaine with 1:200,000 epinephrine and a 1:10 solution of 8.4% sodium bicarbonate
Spiral Flap Text: The defect edges were debeveled with a #15 scalpel blade.  Given the location of the defect, shape of the defect and the proximity to free margins a spiral flap was deemed most appropriate.  Using a sterile surgical marker, an appropriate rotation flap was drawn incorporating the defect and placing the expected incisions within the relaxed skin tension lines where possible. The area thus outlined was incised deep to adipose tissue with a #15 scalpel blade.  The skin margins were undermined to an appropriate distance in all directions utilizing iris scissors.
Mercedes Flap Text: The defect edges were debeveled with a #15 scalpel blade.  Given the location of the defect, shape of the defect and the proximity to free margins a Mercedes flap was deemed most appropriate.  Using a sterile surgical marker, an appropriate advancement flap was drawn incorporating the defect and placing the expected incisions within the relaxed skin tension lines where possible. The area thus outlined was incised deep to adipose tissue with a #15 scalpel blade.  The skin margins were undermined to an appropriate distance in all directions utilizing iris scissors.
Lazy S Intermediate Repair Preamble Text (Leave Blank If You Do Not Want): Undermining was performed with blunt dissection.
Island Pedicle Flap-Requiring Vessel Identification Text: The defect edges were debeveled with a #15 scalpel blade.  Given the location of the defect, shape of the defect and the proximity to free margins an island pedicle advancement flap was deemed most appropriate.  Using a sterile surgical marker, an appropriate advancement flap was drawn, based on the axial vessel mentioned above, incorporating the defect, outlining the appropriate donor tissue and placing the expected incisions within the relaxed skin tension lines where possible.    The area thus outlined was incised deep to adipose tissue with a #15 scalpel blade.  The skin margins were undermined to an appropriate distance in all directions around the primary defect and laterally outward around the island pedicle utilizing iris scissors.  There was minimal undermining beneath the pedicle flap.
Nostril Rim Text: The closure involved the nostril rim.
Transposition Flap Text: The defect edges were debeveled with a #15 scalpel blade.  Given the location of the defect and the proximity to free margins a transposition flap was deemed most appropriate.  Using a sterile surgical marker, an appropriate transposition flap was drawn incorporating the defect.    The area thus outlined was incised deep to adipose tissue with a #15 scalpel blade.  The skin margins were undermined to an appropriate distance in all directions utilizing iris scissors.
V-Y Plasty Text: The defect edges were debeveled with a #15 scalpel blade.  Given the location of the defect, shape of the defect and the proximity to free margins an V-Y advancement flap was deemed most appropriate.  Using a sterile surgical marker, an appropriate advancement flap was drawn incorporating the defect and placing the expected incisions within the relaxed skin tension lines where possible.    The area thus outlined was incised deep to adipose tissue with a #15 scalpel blade.  The skin margins were undermined to an appropriate distance in all directions utilizing iris scissors.
Posterior Auricular Interpolation Flap Text: A decision was made to reconstruct the defect utilizing an interpolation axial flap and a staged reconstruction.  A telfa template was made of the defect.  This telfa template was then used to outline the posterior auricular interpolation flap.  The donor area for the pedicle flap was then injected with anesthesia.  The flap was excised through the skin and subcutaneous tissue down to the layer of the underlying musculature.  The pedicle flap was carefully excised within this deep plane to maintain its blood supply.  The edges of the donor site were undermined.   The donor site was closed in a primary fashion.  The pedicle was then rotated into position and sutured.  Once the tube was sutured into place, adequate blood supply was confirmed with blanching and refill.  The pedicle was then wrapped with xeroform gauze and dressed appropriately with a telfa and gauze bandage to ensure continued blood supply and protect the attached pedicle.
Chonodrocutaneous Helical Advancement Flap Text: The defect edges were debeveled with a #15 scalpel blade.  Given the location of the defect and the proximity to free margins a chondrocutaneous helical advancement flap was deemed most appropriate.  Using a sterile surgical marker, the appropriate advancement flap was drawn incorporating the defect and placing the expected incisions within the relaxed skin tension lines where possible.    The area thus outlined was incised deep to adipose tissue with a #15 scalpel blade.  The skin margins were undermined to an appropriate distance in all directions utilizing iris scissors.
Cartilage Graft Text: The defect edges were debeveled with a #15 scalpel blade.  Given the location of the defect, shape of the defect, the fact the defect involved a full thickness cartilage defect a cartilage graft was deemed most appropriate.  An appropriate donor site was identified, cleansed, and anesthetized. The cartilage graft was then harvested and transferred to the recipient site, oriented appropriately and then sutured into place.  The secondary defect was then repaired using a primary closure.
Orbicularis Oris Muscle Flap Text: The defect edges were debeveled with a #15 scalpel blade.  Given that the defect affected the competency of the oral sphincter an orbicularis oris muscle flap was deemed most appropriate to restore this competency and normal muscle function.  Using a sterile surgical marker, an appropriate flap was drawn incorporating the defect. The area thus outlined was incised with a #15 scalpel blade.
Keystone Flap Text: The defect edges were debeveled with a #15 scalpel blade.  Given the location of the defect, shape of the defect a keystone flap was deemed most appropriate.  Using a sterile surgical marker, an appropriate keystone flap was drawn incorporating the defect, outlining the appropriate donor tissue and placing the expected incisions within the relaxed skin tension lines where possible. The area thus outlined was incised deep to adipose tissue with a #15 scalpel blade.  The skin margins were undermined to an appropriate distance in all directions around the primary defect and laterally outward around the flap utilizing iris scissors.
Helical Rim Advancement Flap Text: The defect edges were debeveled with a #15 blade scalpel.  Given the location of the defect and the proximity to free margins (helical rim) a double helical rim advancement flap was deemed most appropriate.  Using a sterile surgical marker, the appropriate advancement flaps were drawn incorporating the defect and placing the expected incisions between the helical rim and antihelix where possible.  The area thus outlined was incised through and through with a #15 scalpel blade.  With a skin hook and iris scissors, the flaps were gently and sharply undermined and freed up.
Saucerization Excision Additional Text (Leave Blank If You Do Not Want): The margin was drawn around the clinically apparent lesion.  Incisions were then made along these lines, in a tangential fashion, to the appropriate tissue plane and the lesion was extirpated.
Complex Repair And Transposition Flap Text: The defect edges were debeveled with a #15 scalpel blade.  The primary defect was closed partially with a complex linear closure.  Given the location of the remaining defect, shape of the defect and the proximity to free margins a transposition flap was deemed most appropriate for complete closure of the defect.  Using a sterile surgical marker, an appropriate advancement flap was drawn incorporating the defect and placing the expected incisions within the relaxed skin tension lines where possible.    The area thus outlined was incised deep to adipose tissue with a #15 scalpel blade.  The skin margins were undermined to an appropriate distance in all directions utilizing iris scissors.
Vermilion Border Text: The closure involved the vermilion border.
Adjacent Tissue Transfer Text: The defect edges were debeveled with a #15 scalpel blade.  Given the location of the defect and the proximity to free margins an adjacent tissue transfer was deemed most appropriate.  Using a sterile surgical marker, an appropriate flap was drawn incorporating the defect and placing the expected incisions within the relaxed skin tension lines where possible.    The area thus outlined was incised deep to adipose tissue with a #15 scalpel blade.  The skin margins were undermined to an appropriate distance in all directions utilizing iris scissors.
Excision Method: Fusiform
V-Y Flap Text: The defect edges were debeveled with a #15 scalpel blade.  Given the location of the defect, shape of the defect and the proximity to free margins a V-Y flap was deemed most appropriate.  Using a sterile surgical marker, an appropriate advancement flap was drawn incorporating the defect and placing the expected incisions within the relaxed skin tension lines where possible.    The area thus outlined was incised deep to adipose tissue with a #15 scalpel blade.  The skin margins were undermined to an appropriate distance in all directions utilizing iris scissors.
Wound Care: Petrolatum
Hemigard Postcare Instructions: The HEMIGARD strips are to remain completely dry for at least 5-7 days.
Double Island Pedicle Flap Text: The defect edges were debeveled with a #15 scalpel blade.  Given the location of the defect, shape of the defect and the proximity to free margins a double island pedicle advancement flap was deemed most appropriate.  Using a sterile surgical marker, an appropriate advancement flap was drawn incorporating the defect, outlining the appropriate donor tissue and placing the expected incisions within the relaxed skin tension lines where possible.    The area thus outlined was incised deep to adipose tissue with a #15 scalpel blade.  The skin margins were undermined to an appropriate distance in all directions around the primary defect and laterally outward around the island pedicle utilizing iris scissors.  There was minimal undermining beneath the pedicle flap.
Abbe Flap (Lower To Upper Lip) Text: The defect of the upper lip was assessed and measured.  Given the location and size of the defect, an Abbe flap was deemed most appropriate.  Using a sterile surgical marker, an appropriate Abbe flap was measured and drawn on the lower lip. Local anesthesia was then infiltrated. A scalpel was then used to incise the upper lip through and through the skin, vermilion, muscle and mucosa, leaving the flap pedicled on the opposite side.  The flap was then rotated and transferred to the lower lip defect.  The flap was then sutured into place with a three layer technique, closing the orbicularis oris muscle layer with subcutaneous buried sutures, followed by a mucosal layer and an epidermal layer.
Dermal Autograft Text: The defect edges were debeveled with a #15 scalpel blade.  Given the location of the defect, shape of the defect and the proximity to free margins a dermal autograft was deemed most appropriate.  Using a sterile surgical marker, the primary defect shape was transferred to the donor site. The area thus outlined was incised deep to adipose tissue with a #15 scalpel blade.  The harvested graft was then trimmed of adipose and epidermal tissue until only dermis was left.  The skin graft was then placed in the primary defect and oriented appropriately.
Split-Thickness Skin Graft Text: The defect edges were debeveled with a #15 scalpel blade.  Given the location of the defect, shape of the defect and the proximity to free margins a split thickness skin graft was deemed most appropriate.  Using a sterile surgical marker, the primary defect shape was transferred to the donor site. The split thickness graft was then harvested.  The skin graft was then placed in the primary defect and oriented appropriately.
Advancement-Rotation Flap Text: The defect edges were debeveled with a #15 scalpel blade.  Given the location of the defect, shape of the defect and the proximity to free margins an advancement-rotation flap was deemed most appropriate.  Using a sterile surgical marker, an appropriate flap was drawn incorporating the defect and placing the expected incisions within the relaxed skin tension lines where possible. The area thus outlined was incised deep to adipose tissue with a #15 scalpel blade.  The skin margins were undermined to an appropriate distance in all directions utilizing iris scissors.
Complex Repair And Bilobe Flap Text: The defect edges were debeveled with a #15 scalpel blade.  The primary defect was closed partially with a complex linear closure.  Given the location of the remaining defect, shape of the defect and the proximity to free margins a bilobe flap was deemed most appropriate for complete closure of the defect.  Using a sterile surgical marker, an appropriate advancement flap was drawn incorporating the defect and placing the expected incisions within the relaxed skin tension lines where possible.    The area thus outlined was incised deep to adipose tissue with a #15 scalpel blade.  The skin margins were undermined to an appropriate distance in all directions utilizing iris scissors.
Rotation Flap Text: The defect edges were debeveled with a #15 scalpel blade.  Given the location of the defect, shape of the defect and the proximity to free margins a rotation flap was deemed most appropriate.  Using a sterile surgical marker, an appropriate rotation flap was drawn incorporating the defect and placing the expected incisions within the relaxed skin tension lines where possible.    The area thus outlined was incised deep to adipose tissue with a #15 scalpel blade.  The skin margins were undermined to an appropriate distance in all directions utilizing iris scissors.
Complex Repair And Skin Substitute Graft Text: The defect edges were debeveled with a #15 scalpel blade.  The primary defect was closed partially with a complex linear closure.  Given the location of the remaining defect, shape of the defect and the proximity to free margins a skin substitute graft was deemed most appropriate to repair the remaining defect.  The graft was trimmed to fit the size of the remaining defect.  The graft was then placed in the primary defect, oriented appropriately, and sutured into place.
Perilesional Excision Additional Text (Leave Blank If You Do Not Want): The margin was drawn around the clinically apparent lesion. Incisions were then made along these lines to the appropriate tissue plane and the lesion was extirpated.
Estimated Blood Loss (Cc): minimal
O-T Advancement Flap Text: The defect edges were debeveled with a #15 scalpel blade.  Given the location of the defect, shape of the defect and the proximity to free margins an O-T advancement flap was deemed most appropriate.  Using a sterile surgical marker, an appropriate advancement flap was drawn incorporating the defect and placing the expected incisions within the relaxed skin tension lines where possible.    The area thus outlined was incised deep to adipose tissue with a #15 scalpel blade.  The skin margins were undermined to an appropriate distance in all directions utilizing iris scissors.
Complex Repair And Dermal Autograft Text: The defect edges were debeveled with a #15 scalpel blade.  The primary defect was closed partially with a complex linear closure.  Given the location of the defect, shape of the defect and the proximity to free margins an dermal autograft was deemed most appropriate to repair the remaining defect.  The graft was trimmed to fit the size of the remaining defect.  The graft was then placed in the primary defect, oriented appropriately, and sutured into place.
Intermediate / Complex Repair - Final Wound Length In Cm: 4.1
Eliptical Excision Additional Text (Leave Blank If You Do Not Want): The margin was drawn around the clinically apparent lesion.  An elliptical shape was then drawn on the skin incorporating the lesion and margins.  Incisions were then made along these lines to the appropriate tissue plane and the lesion was extirpated.
Mastoid Interpolation Flap Text: A decision was made to reconstruct the defect utilizing an interpolation axial flap and a staged reconstruction.  A telfa template was made of the defect.  This telfa template was then used to outline the mastoid interpolation flap.  The donor area for the pedicle flap was then injected with anesthesia.  The flap was excised through the skin and subcutaneous tissue down to the layer of the underlying musculature.  The pedicle flap was carefully excised within this deep plane to maintain its blood supply.  The edges of the donor site were undermined.   The donor site was closed in a primary fashion.  The pedicle was then rotated into position and sutured.  Once the tube was sutured into place, adequate blood supply was confirmed with blanching and refill.  The pedicle was then wrapped with xeroform gauze and dressed appropriately with a telfa and gauze bandage to ensure continued blood supply and protect the attached pedicle.
Burow's Graft Text: The defect edges were debeveled with a #15 scalpel blade.  Given the location of the defect, shape of the defect, the proximity to free margins and the presence of a standing cone deformity a Burow's skin graft was deemed most appropriate. The standing cone was removed and this tissue was then trimmed to the shape of the primary defect. The adipose tissue was also removed until only dermis and epidermis were left.  The skin margins of the secondary defect were undermined to an appropriate distance in all directions utilizing iris scissors.  The secondary defect was closed with interrupted buried subcutaneous sutures.  The skin edges were then re-apposed with running  sutures.  The skin graft was then placed in the primary defect and oriented appropriately.
Detail Level: Detailed
Burow's Advancement Flap Text: The defect edges were debeveled with a #15 scalpel blade.  Given the location of the defect and the proximity to free margins a Burow's advancement flap was deemed most appropriate.  Using a sterile surgical marker, the appropriate advancement flap was drawn incorporating the defect and placing the expected incisions within the relaxed skin tension lines where possible.    The area thus outlined was incised deep to adipose tissue with a #15 scalpel blade.  The skin margins were undermined to an appropriate distance in all directions utilizing iris scissors.
Complex Repair And Burow's Graft Text: The defect edges were debeveled with a #15 scalpel blade.  The primary defect was closed partially with a complex linear closure.  Given the location of the defect, shape of the defect, the proximity to free margins and the presence of a standing cone deformity a Burow's graft was deemed most appropriate to repair the remaining defect.  The graft was trimmed to fit the size of the remaining defect.  The graft was then placed in the primary defect, oriented appropriately, and sutured into place.
Nasalis-Muscle-Based Myocutaneous Island Pedicle Flap Text: Using a #15 blade, an incision was made around the donor flap to the level of the nasalis muscle. Wide lateral undermining was then performed in both the subcutaneous plane above the nasalis muscle, and in a submuscular plane just above periosteum. This allowed the formation of a free nasalis muscle axial pedicle (based on the angular artery) which was still attached to the actual cutaneous flap, increasing its mobility and vascular viability. Hemostasis was obtained with pinpoint electrocoagulation. The flap was mobilized into position and the pivotal anchor points positioned and stabilized with buried interrupted sutures. Subcutaneous and dermal tissues were closed in a multilayered fashion with sutures. Tissue redundancies were excised, and the epidermal edges were apposed without significant tension and sutured with sutures.
Bilobed Flap Text: The defect edges were debeveled with a #15 scalpel blade.  Given the location of the defect and the proximity to free margins a bilobe flap was deemed most appropriate.  Using a sterile surgical marker, an appropriate bilobe flap drawn around the defect.    The area thus outlined was incised deep to adipose tissue with a #15 scalpel blade.  The skin margins were undermined to an appropriate distance in all directions utilizing iris scissors.
Epidermal Sutures: 5-0 Plain Gut
Retention Suture Text: Retention sutures were placed to support the closure and prevent dehiscence.
Mucosal Advancement Flap Text: Given the location of the defect, shape of the defect and the proximity to free margins a mucosal advancement flap was deemed most appropriate. Incisions were made with a 15 blade scalpel in the appropriate fashion along the cutaneous vermilion border and the mucosal lip. The remaining actinically damaged mucosal tissue was excised.  The mucosal advancement flap was then elevated to the gingival sulcus with care taken to preserve the neurovascular structures and advanced into the primary defect. Care was taken to ensure that precise realignment of the vermilion border was achieved.
Complex Repair And Tissue Cultured Epidermal Autograft Text: The defect edges were debeveled with a #15 scalpel blade.  The primary defect was closed partially with a complex linear closure.  Given the location of the defect, shape of the defect and the proximity to free margins an tissue cultured epidermal autograft was deemed most appropriate to repair the remaining defect.  The graft was trimmed to fit the size of the remaining defect.  The graft was then placed in the primary defect, oriented appropriately, and sutured into place.
Suturegard Body: The suture ends were repeatedly re-tightened and re-clamped to achieve the desired tissue expansion.
Complex Repair And Rhombic Flap Text: The defect edges were debeveled with a #15 scalpel blade.  The primary defect was closed partially with a complex linear closure.  Given the location of the remaining defect, shape of the defect and the proximity to free margins a rhombic flap was deemed most appropriate for complete closure of the defect.  Using a sterile surgical marker, an appropriate advancement flap was drawn incorporating the defect and placing the expected incisions within the relaxed skin tension lines where possible.    The area thus outlined was incised deep to adipose tissue with a #15 scalpel blade.  The skin margins were undermined to an appropriate distance in all directions utilizing iris scissors.
Staged Advancement Flap Text: The defect edges were debeveled with a #15 scalpel blade.  Given the location of the defect, shape of the defect and the proximity to free margins a staged advancement flap was deemed most appropriate.  Using a sterile surgical marker, an appropriate advancement flap was drawn incorporating the defect and placing the expected incisions within the relaxed skin tension lines where possible. The area thus outlined was incised deep to adipose tissue with a #15 scalpel blade.  The skin margins were undermined to an appropriate distance in all directions utilizing iris scissors.
Cheek Interpolation Flap Text: A decision was made to reconstruct the defect utilizing an interpolation axial flap and a staged reconstruction.  A telfa template was made of the defect.  This telfa template was then used to outline the Cheek Interpolation flap.  The donor area for the pedicle flap was then injected with anesthesia.  The flap was excised through the skin and subcutaneous tissue down to the layer of the underlying musculature.  The interpolation flap was carefully excised within this deep plane to maintain its blood supply.  The edges of the donor site were undermined.   The donor site was closed in a primary fashion.  The pedicle was then rotated into position and sutured.  Once the tube was sutured into place, adequate blood supply was confirmed with blanching and refill.  The pedicle was then wrapped with xeroform gauze and dressed appropriately with a telfa and gauze bandage to ensure continued blood supply and protect the attached pedicle.
Purse String (Intermediate) Text: Given the location of the defect and the characteristics of the surrounding skin a purse string intermediate closure was deemed most appropriate.  Undermining was performed circumferentially around the surgical defect.  A purse string suture was then placed and tightened.
Muscle Hinge Flap Text: The defect edges were debeveled with a #15 scalpel blade.  Given the size, depth and location of the defect and the proximity to free margins a muscle hinge flap was deemed most appropriate.  Using a sterile surgical marker, an appropriate hinge flap was drawn incorporating the defect. The area thus outlined was incised with a #15 scalpel blade.  The skin margins were undermined to an appropriate distance in all directions utilizing iris scissors.
Medical Necessity Clause: This procedure was medically necessary because the lesion that was treated was:
Hemigard Intro: Due to skin fragility and wound tension, it was decided to use HEMIGARD adhesive retention suture devices to permit a linear closure. The skin was cleaned and dried for a 6cm distance away from the wound. Excessive hair, if present, was removed to allow for adhesion.
Excision Depth: adipose tissue
Anesthesia Type: 1% lidocaine with epinephrine
Abbe Flap (Upper To Lower Lip) Text: The defect of the lower lip was assessed and measured.  Given the location and size of the defect, an Abbe flap was deemed most appropriate.  Using a sterile surgical marker, an appropriate Abbe flap was measured and drawn on the upper lip. Local anesthesia was then infiltrated.  A scalpel was then used to incise the upper lip through and through the skin, vermilion, muscle and mucosa, leaving the flap pedicled on the opposite side.  The flap was then rotated and transferred to the lower lip defect.  The flap was then sutured into place with a three layer technique, closing the orbicularis oris muscle layer with subcutaneous buried sutures, followed by a mucosal layer and an epidermal layer.
Epidermal Autograft Text: The defect edges were debeveled with a #15 scalpel blade.  Given the location of the defect, shape of the defect and the proximity to free margins an epidermal autograft was deemed most appropriate.  Using a sterile surgical marker, the primary defect shape was transferred to the donor site. The epidermal graft was then harvested.  The skin graft was then placed in the primary defect and oriented appropriately.
Complex Repair And Epidermal Autograft Text: The defect edges were debeveled with a #15 scalpel blade.  The primary defect was closed partially with a complex linear closure.  Given the location of the defect, shape of the defect and the proximity to free margins an epidermal autograft was deemed most appropriate to repair the remaining defect.  The graft was trimmed to fit the size of the remaining defect.  The graft was then placed in the primary defect, oriented appropriately, and sutured into place.
Rhombic Flap Text: The defect edges were debeveled with a #15 scalpel blade.  Given the location of the defect and the proximity to free margins a rhombic flap was deemed most appropriate.  Using a sterile surgical marker, an appropriate rhombic flap was drawn incorporating the defect.    The area thus outlined was incised deep to adipose tissue with a #15 scalpel blade.  The skin margins were undermined to an appropriate distance in all directions utilizing iris scissors.
Trilobed Flap Text: The defect edges were debeveled with a #15 scalpel blade.  Given the location of the defect and the proximity to free margins a trilobed flap was deemed most appropriate.  Using a sterile surgical marker, an appropriate trilobed flap drawn around the defect.    The area thus outlined was incised deep to adipose tissue with a #15 scalpel blade.  The skin margins were undermined to an appropriate distance in all directions utilizing iris scissors.
Complex Repair And O-L Flap Text: The defect edges were debeveled with a #15 scalpel blade.  The primary defect was closed partially with a complex linear closure.  Given the location of the remaining defect, shape of the defect and the proximity to free margins an O-L flap was deemed most appropriate for complete closure of the defect.  Using a sterile surgical marker, an appropriate flap was drawn incorporating the defect and placing the expected incisions within the relaxed skin tension lines where possible.    The area thus outlined was incised deep to adipose tissue with a #15 scalpel blade.  The skin margins were undermined to an appropriate distance in all directions utilizing iris scissors.
Hatchet Flap Text: The defect edges were debeveled with a #15 scalpel blade.  Given the location of the defect, shape of the defect and the proximity to free margins a hatchet flap was deemed most appropriate.  Using a sterile surgical marker, an appropriate hatchet flap was drawn incorporating the defect and placing the expected incisions within the relaxed skin tension lines where possible.    The area thus outlined was incised deep to adipose tissue with a #15 scalpel blade.  The skin margins were undermined to an appropriate distance in all directions utilizing iris scissors.
Excisional Biopsy Additional Text (Leave Blank If You Do Not Want): The margin was drawn around the clinically apparent lesion. An elliptical shape was then drawn on the skin incorporating the lesion and margins.  Incisions were then made along these lines to the appropriate tissue plane and the lesion was extirpated.
A-T Advancement Flap Text: The defect edges were debeveled with a #15 scalpel blade.  Given the location of the defect, shape of the defect and the proximity to free margins an A-T advancement flap was deemed most appropriate.  Using a sterile surgical marker, an appropriate advancement flap was drawn incorporating the defect and placing the expected incisions within the relaxed skin tension lines where possible.    The area thus outlined was incised deep to adipose tissue with a #15 scalpel blade.  The skin margins were undermined to an appropriate distance in all directions utilizing iris scissors.
Melolabial Interpolation Flap Text: A decision was made to reconstruct the defect utilizing an interpolation axial flap and a staged reconstruction.  A telfa template was made of the defect.  This telfa template was then used to outline the melolabial interpolation flap.  The donor area for the pedicle flap was then injected with anesthesia.  The flap was excised through the skin and subcutaneous tissue down to the layer of the underlying musculature.  The pedicle flap was carefully excised within this deep plane to maintain its blood supply.  The edges of the donor site were undermined.   The donor site was closed in a primary fashion.  The pedicle was then rotated into position and sutured.  Once the tube was sutured into place, adequate blood supply was confirmed with blanching and refill.  The pedicle was then wrapped with xeroform gauze and dressed appropriately with a telfa and gauze bandage to ensure continued blood supply and protect the attached pedicle.
Banner Transposition Flap Text: The defect edges were debeveled with a #15 scalpel blade.  Given the location of the defect and the proximity to free margins a Banner transposition flap was deemed most appropriate.  Using a sterile surgical marker, an appropriate flap drawn around the defect. The area thus outlined was incised deep to adipose tissue with a #15 scalpel blade.  The skin margins were undermined to an appropriate distance in all directions utilizing iris scissors.
Complex Repair And A-T Advancement Flap Text: The defect edges were debeveled with a #15 scalpel blade.  The primary defect was closed partially with a complex linear closure.  Given the location of the remaining defect, shape of the defect and the proximity to free margins an A-T advancement flap was deemed most appropriate for complete closure of the defect.  Using a sterile surgical marker, an appropriate advancement flap was drawn incorporating the defect and placing the expected incisions within the relaxed skin tension lines where possible.    The area thus outlined was incised deep to adipose tissue with a #15 scalpel blade.  The skin margins were undermined to an appropriate distance in all directions utilizing iris scissors.

## 2022-06-27 NOTE — PROCEDURE: TREATMENT REGIMEN
Plan: Patient warned that this is an area if the body that has higher possibility of hypertrophic scars.  If scar thickens contact the office to get kenalog injections.
Detail Level: Zone

## 2022-06-27 NOTE — HPI: SKIN LESION
What Type Of Note Output Would You Prefer (Optional)?: Bullet Format
How Severe Is Your Skin Lesion?: moderate
Has Your Skin Lesion Been Treated?: been treated
Is This A New Presentation, Or A Follow-Up?: Growth
Additional History: Patient in office as a walk in today requesting removal of cyst on her chest.  She states it’s growing. She states she has had it drained several times in the past.

## 2022-07-13 ENCOUNTER — OFFICE VISIT (OUTPATIENT)
Dept: URBAN - METROPOLITAN AREA CLINIC 44 | Facility: CLINIC | Age: 72
End: 2022-07-13
Payer: MEDICARE

## 2022-07-13 DIAGNOSIS — D23.10 OTHER BENIGN NEOPLASM OF SKIN OF EYELID INCLUDING CANTHUS: Primary | ICD-10-CM

## 2022-07-13 PROCEDURE — 99204 OFFICE O/P NEW MOD 45 MIN: CPT | Performed by: OPHTHALMOLOGY

## 2022-07-13 ASSESSMENT — INTRAOCULAR PRESSURE
OS: 12
OD: 12

## 2022-07-13 NOTE — IMPRESSION/PLAN
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 Plan: Two lesion RLL. Nasal lesion may be suspicious for BCC. Monitor RTC : 3-4 months f/u

## 2022-09-18 ENCOUNTER — HEALTH MAINTENANCE LETTER (OUTPATIENT)
Age: 72
End: 2022-09-18

## 2023-01-28 ENCOUNTER — HEALTH MAINTENANCE LETTER (OUTPATIENT)
Age: 73
End: 2023-01-28

## 2023-06-04 ENCOUNTER — HEALTH MAINTENANCE LETTER (OUTPATIENT)
Age: 73
End: 2023-06-04

## 2024-03-19 ENCOUNTER — MYC MEDICAL ADVICE (OUTPATIENT)
Dept: NEPHROLOGY | Facility: CLINIC | Age: 74
End: 2024-03-19
Payer: MEDICARE

## 2024-03-19 DIAGNOSIS — M31.30 GRANULOMATOSIS WITH POLYANGIITIS (H): Primary | ICD-10-CM

## 2024-03-20 NOTE — TELEPHONE ENCOUNTER
Returned call to patient who has been seeing a nephrologist but not one that has GPA expertise.  Denies any GPA flare symptoms at this time, not on any medications besides Thyroid medication.  Next Rituximab infusion is due in June.  Updated Dr. Fernandes and ordered standard Vasculitis labs including CD19 for pre-clinic labs T.O. Since patient lives 3hrs away will have her come at 1pm to allow time to get most of her labs completed prior to appt time.  Patient given Vasculitis program nurse line for any acute needs prior to appt,  Elisa Andrade RN, BSN, PHN  Vasculitis & Lupus Program Nephrology Nurse Navigator  [phone]

## 2024-05-09 ENCOUNTER — OFFICE VISIT (OUTPATIENT)
Dept: NEPHROLOGY | Facility: CLINIC | Age: 74
End: 2024-05-09
Attending: INTERNAL MEDICINE
Payer: MEDICARE

## 2024-05-09 ENCOUNTER — LAB (OUTPATIENT)
Dept: LAB | Facility: CLINIC | Age: 74
End: 2024-05-09
Attending: INTERNAL MEDICINE
Payer: MEDICARE

## 2024-05-09 VITALS
OXYGEN SATURATION: 98 % | SYSTOLIC BLOOD PRESSURE: 114 MMHG | DIASTOLIC BLOOD PRESSURE: 76 MMHG | WEIGHT: 221.3 LBS | TEMPERATURE: 97.7 F | BODY MASS INDEX: 39.2 KG/M2 | HEART RATE: 67 BPM

## 2024-05-09 DIAGNOSIS — D84.9 IMMUNOSUPPRESSION (H): ICD-10-CM

## 2024-05-09 DIAGNOSIS — M31.30 GRANULOMATOSIS WITH POLYANGIITIS (H): ICD-10-CM

## 2024-05-09 DIAGNOSIS — M31.31 GRANULOMATOSIS WITH POLYANGIITIS WITH RENAL INVOLVEMENT (H): Primary | ICD-10-CM

## 2024-05-09 LAB
ALBUMIN MFR UR ELPH: 10.4 MG/DL
ALBUMIN SERPL BCG-MCNC: 3.9 G/DL (ref 3.5–5.2)
ALBUMIN UR-MCNC: NEGATIVE MG/DL
ANION GAP SERPL CALCULATED.3IONS-SCNC: 11 MMOL/L (ref 7–15)
APPEARANCE UR: CLEAR
BASOPHILS # BLD AUTO: 0.1 10E3/UL (ref 0–0.2)
BASOPHILS NFR BLD AUTO: 1 %
BILIRUB UR QL STRIP: NEGATIVE
BUN SERPL-MCNC: 20.1 MG/DL (ref 8–23)
CALCIUM SERPL-MCNC: 8.7 MG/DL (ref 8.8–10.2)
CD19 B CELL COMMENT: ABNORMAL
CD19 CELLS # BLD: <1 CELLS/UL (ref 107–698)
CD19 CELLS NFR BLD: <1 % (ref 6–27)
CHLORIDE SERPL-SCNC: 107 MMOL/L (ref 98–107)
COLOR UR AUTO: ABNORMAL
CREAT SERPL-MCNC: 0.91 MG/DL (ref 0.51–0.95)
CREAT UR-MCNC: 145 MG/DL
CRP SERPL-MCNC: 10.8 MG/L
DEPRECATED HCO3 PLAS-SCNC: 23 MMOL/L (ref 22–29)
EGFRCR SERPLBLD CKD-EPI 2021: 66 ML/MIN/1.73M2
EOSINOPHIL # BLD AUTO: 0.3 10E3/UL (ref 0–0.7)
EOSINOPHIL NFR BLD AUTO: 4 %
ERYTHROCYTE [DISTWIDTH] IN BLOOD BY AUTOMATED COUNT: 13.7 % (ref 10–15)
GLUCOSE SERPL-MCNC: 104 MG/DL (ref 70–99)
GLUCOSE UR STRIP-MCNC: NEGATIVE MG/DL
HCT VFR BLD AUTO: 41 % (ref 35–47)
HGB BLD-MCNC: 13.3 G/DL (ref 11.7–15.7)
HGB UR QL STRIP: ABNORMAL
IMM GRANULOCYTES # BLD: 0 10E3/UL
IMM GRANULOCYTES NFR BLD: 0 %
KETONES UR STRIP-MCNC: NEGATIVE MG/DL
LEUKOCYTE ESTERASE UR QL STRIP: ABNORMAL
LYMPHOCYTES # BLD AUTO: 1.6 10E3/UL (ref 0.8–5.3)
LYMPHOCYTES NFR BLD AUTO: 18 %
MCH RBC QN AUTO: 29.6 PG (ref 26.5–33)
MCHC RBC AUTO-ENTMCNC: 32.4 G/DL (ref 31.5–36.5)
MCV RBC AUTO: 91 FL (ref 78–100)
MONOCYTES # BLD AUTO: 0.6 10E3/UL (ref 0–1.3)
MONOCYTES NFR BLD AUTO: 7 %
MUCOUS THREADS #/AREA URNS LPF: PRESENT /LPF
NEUTROPHILS # BLD AUTO: 5.9 10E3/UL (ref 1.6–8.3)
NEUTROPHILS NFR BLD AUTO: 70 %
NITRATE UR QL: NEGATIVE
NRBC # BLD AUTO: 0 10E3/UL
NRBC BLD AUTO-RTO: 0 /100
PH UR STRIP: 5.5 [PH] (ref 5–7)
PHOSPHATE SERPL-MCNC: 3.6 MG/DL (ref 2.5–4.5)
PLATELET # BLD AUTO: 341 10E3/UL (ref 150–450)
POTASSIUM SERPL-SCNC: 4.7 MMOL/L (ref 3.4–5.3)
PROT/CREAT 24H UR: 0.07 MG/MG CR (ref 0–0.2)
RBC # BLD AUTO: 4.5 10E6/UL (ref 3.8–5.2)
RBC URINE: 3 /HPF
SODIUM SERPL-SCNC: 141 MMOL/L (ref 135–145)
SP GR UR STRIP: 1.03 (ref 1–1.03)
SQUAMOUS EPITHELIAL: 3 /HPF
UROBILINOGEN UR STRIP-MCNC: NORMAL MG/DL
WBC # BLD AUTO: 8.5 10E3/UL (ref 4–11)
WBC URINE: 5 /HPF

## 2024-05-09 PROCEDURE — 86037 ANCA TITER EACH ANTIBODY: CPT | Performed by: INTERNAL MEDICINE

## 2024-05-09 PROCEDURE — 86355 B CELLS TOTAL COUNT: CPT | Performed by: INTERNAL MEDICINE

## 2024-05-09 PROCEDURE — 82040 ASSAY OF SERUM ALBUMIN: CPT | Performed by: INTERNAL MEDICINE

## 2024-05-09 PROCEDURE — 82043 UR ALBUMIN QUANTITATIVE: CPT | Performed by: INTERNAL MEDICINE

## 2024-05-09 PROCEDURE — 81001 URINALYSIS AUTO W/SCOPE: CPT | Performed by: PATHOLOGY

## 2024-05-09 PROCEDURE — 36415 COLL VENOUS BLD VENIPUNCTURE: CPT | Performed by: PATHOLOGY

## 2024-05-09 PROCEDURE — 83516 IMMUNOASSAY NONANTIBODY: CPT | Performed by: INTERNAL MEDICINE

## 2024-05-09 PROCEDURE — 99000 SPECIMEN HANDLING OFFICE-LAB: CPT | Performed by: PATHOLOGY

## 2024-05-09 PROCEDURE — G0463 HOSPITAL OUTPT CLINIC VISIT: HCPCS | Performed by: INTERNAL MEDICINE

## 2024-05-09 PROCEDURE — 85025 COMPLETE CBC W/AUTO DIFF WBC: CPT | Performed by: PATHOLOGY

## 2024-05-09 PROCEDURE — 82374 ASSAY BLOOD CARBON DIOXIDE: CPT | Performed by: INTERNAL MEDICINE

## 2024-05-09 PROCEDURE — 83876 ASSAY MYELOPEROXIDASE: CPT | Performed by: INTERNAL MEDICINE

## 2024-05-09 PROCEDURE — 86140 C-REACTIVE PROTEIN: CPT | Performed by: INTERNAL MEDICINE

## 2024-05-09 PROCEDURE — 84156 ASSAY OF PROTEIN URINE: CPT | Performed by: INTERNAL MEDICINE

## 2024-05-09 PROCEDURE — 99204 OFFICE O/P NEW MOD 45 MIN: CPT | Performed by: INTERNAL MEDICINE

## 2024-05-09 ASSESSMENT — PAIN SCALES - GENERAL: PAINLEVEL: MODERATE PAIN (5)

## 2024-05-09 NOTE — LETTER
"5/9/2024       RE: Waleska Veronica  [address]  [facility] [address]     Dear Colleague,    Thank you for referring your patient, Waleska Veronica, to the [facility] [place] at [facility]. Please see a copy of my visit note below.    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    2013: Presented with epistaxis, fatigue and followed by hemoptysis. She was hospitalized x 10 days in [place]. Initially, hemoptysis attributed to pneumonia. She mentioned that her lung was \"messed up enough\" that they could not perform a (transbronchial) lung biopsy. She was put on prednisone and tapered off eventually. While she was at [place], the diagnosis of Wegener's was not confirmed.  March 2013:  Back in [place].  Admitted to [facility] with acute renal failure and scant hemoptysis.  She had significant fatigue, oral ulcers, scant purpura on the forearms ( \"months\" after finishing the course of prednisone).  She did not require dialysis.  Dx of \"Wegener's granulomatosis\"  -> plasmapheresis (x 10+ sessions?) under the care of Dr Lowry (nephrology) .  She received prednisone and was treated with Rituximab once per week for 4 weeks.  April 2013 - 2018  : She was on prednisone for around 4 months. She experienced a lot of side effect including weight gain, mood changes, cushingoid facies.  She denies have hyperglycemia.  She was tapered off prednisone She then received rituximab every 6 months (1000 mg)         3/2/19  Had a good trip to [place].  Reports no acute illness since the last visit.  Has " "hot flashes (old)  Dry nose,  No epistaxis.  No oral ulcers.  Cough, with post nasal drip..  Clear but occasional yellow  No F/Chills  Energy:  \" I'm lazy\"  No CP.  No GI symptoms.  No  symptoms.    4/18/19  She feels poorly, in multiple   She is more tired and achy.  \"all joints are hurting\" plantar fasciitis,  Thumbs and knees are hurting (also chronic)  More night sweats over the last 2 weeks.  No F/chills.  NO oral ulcers.  Cough is a little worse from sinus drainage.  More back aches.  One blister on external surface of R arm near elbow, which is now dry and scaly.  She admits to some anxiety because this is the 5th anniversary of her hospitalization.    June 1, 2019  Since last visit she had an URI, bad cough around April 29th.  Flu test was neg.  She eventually got better without antibiotics.  Still has a little cough.  NO F or Chills.  Continues to have chronic diffuse arthralgias.  Was seen by Dr Leger, and was told she had mild crusting.    Continues to have night sweats every night...  Since 1997.  No oral ulcers or epistaxis.  NO  symptoms.      September 26, 2019  Ms Veronica presents for follow up.  She presents feeling generally close to her baseline, but has a number of complaints.  She reports increased fatigue and arthralgias.    Increased frequency of night sweats, and \"hot flashes\" occurring during daytime.  No overt fevers or chills.  No over rash but has 3 new small macular purpuric spots on extensor surface of R forearm  No L Ext swelling.  No cough or hemoptysis, no oral ulcers.      April 8, 2020  Has been in [place] since October 2019.  REceived maintenance dose of Rituximab prior to departure  Has not been able to return to [place] bec of COVID19 pandemic  Her  suffered a CVA with L sided weekness.  She is his full time care taker.  She denies any acute illness, and feels generally well.   She denies F/Chills/Cough/SOB  Denies dysuria or gross hematuria  No L Ext swelling  No rash.    She " "checks her BP at home.  Most recent one was 130/78    Had surveillance blood work in Dec 2019 and late March 2020 .  Results were reviewed with Ms Veronica over the phone.  ANCA were negative at both dates  Hgb 13.8 and 14.2  Plt 344 and 384  Cr 0.89 and 0.85  Chol 187  HDL 40  TSH 5.8    May 23, 2020  Still in [place].  Hot flashes.  But no F/Chills/epistaxis/cough/SOB/CP/GI/ symptoms.    No unusual ADEOLA.  No rash  She is caring for her .  Gets 6-7K steps a day.  She is trying to lose weight, and states she lost 20 lbs.  Her goal is to get down to 199lbs.    August 20, 2020  At this time, she reports overall feeling okay. She did have COVID infection end of May/Rajni, and had some URI symptoms/chills and joint pains.  Since recovering, she is feeling back to her self overall. No fevers, chills, epistaxis, cough, chest pain, SOB, GI/ symptoms. No rashes. She continues to have chronic pain in multiple areas (ribs, knees, back), but nothing new or different. She states she has had hot flashes/sweats since 1997, and they have waxed and waned all those years, and continue to persist without significant change.  She is caring for her , who had a stroke in March. She still continues to get 5K-7K steps a day. She continues to try and work to lose weight, hit a bit of a stall, hanging around 205 lbs.     May 9, 2024  Last \"seen\" in Aug 2020.  Has been in [place]  Has been receiving rituximab q 6-9 months  Other than joint stiffness karel in am, she denies a history of epistaxis, cough, SOB, change in kidney function    Was found to have mild lung fibrosis on CT scan.    Last RTX infusion in Jan 2024   on 3/28/24:  CD19 were 0  UPCR 0.1    Treatment summary  March 2013: Plasmapheresis + Rituximab  Rituximab 1000 mg IV every 6 month since initial treatment in 2013 September 7, 2018: Last dose of Rituximab 1000 mg. (8months after previous dose)    October 2019:  Rituximab 500 mg maintenance dose  Ritux ~ q 6 months  Jan 2024 " Ritux 500 mg maintenance      PAST MEDICAL HISTORY:  Past Medical History:   Diagnosis Date    DVT (deep venous thrombosis) (H)     occured 1 month after starting OCP    GERD (gastroesophageal reflux disease)     Hyperuricemia     Hypothyroid     Obesity (BMI 35.0-39.9 without comorbidity)     Venous insufficiency (chronic) (peripheral)     Wegener's granulomatosis with renal involvement (H)     , No history of complicated pregnancies.  Last pregnancy with twins       PAST SURGICAL HISTORY:  Past Surgical History:   Procedure Laterality Date    HYSTERECTOMY      TUBAL LIGATION        MEDICATIONS:  Current Outpatient Medications   Medication Sig Dispense Refill    levothyroxine (SYNTHROID/LEVOTHROID) 137 MCG tablet daily   3    diltiazem 2% in PLO cream, FV COMPOUNDED, 2% GEL Apply small amount to anal opening three times daily for 8 weeks. (Patient not taking: Reported on 2019) 60 g 0    mupirocin (BACTROBAN) 2 % external ointment Use in nose twice daily for 5 days (Patient not taking: Reported on 2019) 22 g 0    sulfamethoxazole-trimethoprim (BACTRIM DS) 800-160 MG tablet Take 1 tablet Monday/Wednesday/Friday (Patient not taking: Reported on 2024) 12 tablet 2       ALLERGIES:    No Known Allergies     REVIEW OF SYSTEMS:  A comprehensive review of systems was performed and found to be negative except as described here or above.     FAMILY MEDICAL HISTORY:   Has 4 children, Daughter 48, Daughter 47, fraternal twin : daughter and son age 46    PHYSICAL EXAM:   /76   Pulse 67   Temp 97.7  F (36.5  C) (Oral)   Wt 100.4 kg (221 lb 4.8 oz)   SpO2 98%   BMI 39.20 kg/m    Sclerae anicteric  Carotids 2+ no bruits  COR: RRR no rub or gallop  Lungs clear  Abd obese, soft  LExt no edema  Neuro:  speech fluent.  Gait normal  Psy: Affect appropriate    LABS:     Recent Results (from the past 168 hour(s))   CBC with platelets and differential    Collection Time: 24 12:55 PM   Result  Value Ref Range    WBC Count 8.5 4.0 - 11.0 10e3/uL    RBC Count 4.50 3.80 - 5.20 10e6/uL    Hemoglobin 13.3 11.7 - 15.7 g/dL    Hematocrit 41.0 35.0 - 47.0 %    MCV 91 78 - 100 fL    MCH 29.6 26.5 - 33.0 pg    MCHC 32.4 31.5 - 36.5 g/dL    RDW 13.7 10.0 - 15.0 %    Platelet Count 341 150 - 450 10e3/uL    % Neutrophils 70 %    % Lymphocytes 18 %    % Monocytes 7 %    % Eosinophils 4 %    % Basophils 1 %    % Immature Granulocytes 0 %    NRBCs per 100 WBC 0 <1 /100    Absolute Neutrophils 5.9 1.6 - 8.3 10e3/uL    Absolute Lymphocytes 1.6 0.8 - 5.3 10e3/uL    Absolute Monocytes 0.6 0.0 - 1.3 10e3/uL    Absolute Eosinophils 0.3 0.0 - 0.7 10e3/uL    Absolute Basophils 0.1 0.0 - 0.2 10e3/uL    Absolute Immature Granulocytes 0.0 <=0.4 10e3/uL    Absolute NRBCs 0.0 10e3/uL   UA with Microscopic    Collection Time: 05/09/24  1:00 PM   Result Value Ref Range    Color Urine Light Yellow Colorless, Straw, Light Yellow, Yellow    Appearance Urine Clear Clear    Glucose Urine Negative Negative mg/dL    Bilirubin Urine Negative Negative    Ketones Urine Negative Negative mg/dL    Specific Gravity Urine 1.030 1.003 - 1.035    Blood Urine Trace (A) Negative    pH Urine 5.5 5.0 - 7.0    Protein Albumin Urine Negative Negative mg/dL    Urobilinogen Urine Normal Normal, 2.0 mg/dL    Nitrite Urine Negative Negative    Leukocyte Esterase Urine Moderate (A) Negative    Mucus Urine Present (A) None Seen /LPF    RBC Urine 3 (H) <=2 /HPF    WBC Urine 5 <=5 /HPF    Squamous Epithelials Urine 3 (H) <=1 /HPF     Albumin   Date Value Ref Range Status   08/24/2020 3.4 3.4 - 5.0 g/dL Final   06/05/2020 3.9 3.4 - 4.9 g/dL Final   03/24/2020 4.0 3.4 - 4.9 g/dL Final   09/26/2019 3.4 3.4 - 5.0 g/dL Final   08/10/2019 3.2 (L) 3.4 - 5.0 g/dL Final   06/01/2019 3.2 (L) 3.4 - 5.0 g/dL Final   04/18/2019 3.3 (L) 3.4 - 5.0 g/dL Final         URINE STUDIES  Recent Labs   Lab Test 05/09/24  1300 08/24/20  1445 06/05/20  1114 09/26/19  1617 06/01/19  1105    COLOR Light Yellow Yellow Yellow Yellow Yellow   APPEARANCE Clear Clear Other Clear Clear   URINEGLC Negative Negative Neg Negative Negative   URINEBILI Negative Negative Other Negative Negative   URINEKETONE Negative Negative Neg Negative Negative   SG 1.030 >1.030 Other 1.020 1.019   UBLD Trace* Trace* Neg Small* Small*   URINEPH 5.5 5.5  --  5.0 5.0   PROTEIN Negative Negative  --  Negative Negative   UROBILINOGEN  --  2.0* 0.2  --   --    NITRITE Negative Negative Neg Negative Negative   LEUKEST Moderate* Small* Small Trace* Moderate*   RBCU 3* O - 2  --  1 3*   WBCU 5 0 - 5  --  1 10*     CT Chest without IV contrast 6/2/2015  Result Impression     1. 8 mm nodule, with adjacent smaller nodules, in the left lower lobe has been stable since outside chest CT 04/21/2014. Recommend follow-up imaging in 6-12 months.   2. Stable bilateral patchy ground glass opacities, likely due to shallow inspiration.   3. Mild right hilar adenopathy, which is likely reactive.     FINDINGS:  Stable 8 mm nodule in the superior segment of the left lower lobe; this nodule has been stable since outside chest CT 04/21/2014. The two surrounding smaller nodules are relatively stable in appearance since 12/01/2014 (these nodules were obscured on outside chest CT). No new pulmonary nodules. Stable scattered groundglass opacities in both lungs which may be due to shallow inspiration. Tiny calcified granuloma right lung apex. Trace amount of atelectasis in the lingula. Scattered mediastinal and left hilar lymph nodes are more prominent on today's exam, however remain within normal limits for size. New mild right hilar adenopathy, with largest node measuring approximately 1 cm in maximal diameter.     Electronically signed by:    MAGALIS Vargas -[id number] 02-Jun-2015 10:46        Mario Freeman MD [phone] 02-Jun-2015 10:46       CT lung 2018: Stable lung nodules and 1 mm non-obstructing stone at upper pole of left kidney      *CT CHEST HIGH RESOLUTION  wo CONTRAST  EXAM: CT CHEST HIGH RESOLUTION wo CONTRAST  LOCATION: [facility]  DATE: 10/12/2023    INDICATION: Granulomatosis with polyangiitis, unspecified whether  renal involvement (HCC). Wegener's granulomatosis.    COMPARISON: The most recent CT chest is from 09/27/2018.  TECHNIQUE: High resolution images were obtained through the chest  during inspiration with select expiratory views. Prone imaging was  performed. Multiplanar reformats were obtained. Dose reduction  techniques were used.  CONTRAST: None.    FINDINGS:  LUNGS AND PLEURA: Allowing for some limitation on today's exam due to  respiratory motion artifact, pulmonary nodules have again remained  stable. These include the largest in the anterior aspect of the  superior segment left lower lobe measuring 7 mm in diameter on series  8, image 92. Some other examples include in the anterior left upper  lobe (image 102), anterior left lower lobe (images 97 and 98), and  along the major fissure in the right midlung (image 112). No new or  enlarging nodules identified. There is some mild peripheral  interstitial prominence and slight reticular change in the lower  lungs, suspected to represent some mild fibrosis, but this does not  show clear features for diffuse interstitial fibrosis. No evidence of  significant bronchiectasis. No significant air trapping. No pleural  effusion.    MEDIASTINUM/AXILLAE: No lymphadenopathy. No significant pericardial  fluid. Normal caliber thoracic aorta and esophagus.    CORONARY ARTERY CALCIFICATION: None.    UPPER ABDOMEN: No significant findings.    MUSCULOSKELETAL: Compression of the T6 vertebra has developed since  previous CT, status post vertebroplasty. Scoliotic curvature of the  spine, with some mild scattered degenerative changes.    IMPRESSION:  1.  Bilateral pulmonary nodules have remained stable, with no new or enlarging nodules identified.  2.  Although there are some findings suspicious for mild peripheral  fibrosis in the lower lungs, this does not show clear characteristics of diffuse interstitial fibrosis. No evidence of honeycombing or bronchiectasis. No significant air trapping identified.  3.  See above for other exam details.  Exam End: 10/12/23  9:39 AM     ASSESSMENT AND PLAN:  # GPA (dx 2013)  Presented with pulmonary and renal involvement. S/P Rituximab q6 month since 4421-0589 without flare of the disease. As she has been through a quiescent course, we agreed to space out and/or reduce the dosage of rituximab. Last dose of rituximab was Oct 2019.     Ms Veronica is seen for the first time in a bit less than 4 years.  She has been on immunosuppression continuously for 13 years.  She has not had a documented relapse.   She presents with no signs or symptoms suggestive of active disease  CD19 B cells are depleted, and ANCA is equivocal  She has no proteinuria,  GFR is normal.    She is anxious about the finding of mild peripheral pulm fibrosis.  She has no symptoms associated with it, and it is difficult to ascertain the significance of this finding (I am not able to see the images).  I suspect that this is of questionable clinical significance.  It would be reasonable to repeat the CT sumner in the fall to see if there is any progression.       Overall, I would be comfortable spacing out her rituximab doses - and - if stable stopping them - although I sense that Ms Veronica is somewhat anxious about the latter prospect.    Follow up in 4 months     Mario Fernandes MD  Division of Nephrology and Hypertension  Pager: [phone]  May 9, 2024

## 2024-05-09 NOTE — PROGRESS NOTES
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    2013: Presented with epistaxis, fatigue and followed by hemoptysis. She was hospitalized x 10 days in [place]. Initially, hemoptysis attributed to pneumonia. She mentioned that her lung was \"messed up enough\" that they could not perform a (transbronchial) lung biopsy. She was put on prednisone and tapered off eventually. While she was at [place], the diagnosis of Wegener's was not confirmed.  March 2013:  Back in [place].  Admitted to [facility] with acute renal failure and scant hemoptysis.  She had significant fatigue, oral ulcers, scant purpura on the forearms ( \"months\" after finishing the course of prednisone).  She did not require dialysis.  Dx of \"Wegener's granulomatosis\"  -> plasmapheresis (x 10+ sessions?) under the care of Dr Lowry (nephrology) .  She received prednisone and was treated with Rituximab once per week for 4 weeks.  April 2013 - 2018  : She was on prednisone for around 4 months. She experienced a lot of side effect including weight gain, mood changes, cushingoid facies.  She denies have hyperglycemia.  She was tapered off prednisone She then received rituximab every 6 months (1000 mg)         3/2/19  Had a good trip to [place].  Reports no acute illness since the last visit.  Has hot flashes (old)  Dry nose,  No epistaxis.  No oral ulcers.  Cough, with post nasal drip..  Clear but occasional yellow  No F/Chills  Energy:  \" I'm lazy\"  No CP.  No GI symptoms.  No  symptoms.    4/18/19  She feels poorly, in multiple   She is more tired and achy.  \"all joints are hurting\" plantar " "fasciitis,  Thumbs and knees are hurting (also chronic)  More night sweats over the last 2 weeks.  No F/chills.  NO oral ulcers.  Cough is a little worse from sinus drainage.  More back aches.  One blister on external surface of R arm near elbow, which is now dry and scaly.  She admits to some anxiety because this is the 5th anniversary of her hospitalization.    June 1, 2019  Since last visit she had an URI, bad cough around April 29th.  Flu test was neg.  She eventually got better without antibiotics.  Still has a little cough.  NO F or Chills.  Continues to have chronic diffuse arthralgias.  Was seen by Dr Leger, and was told she had mild crusting.    Continues to have night sweats every night...  Since 1997.  No oral ulcers or epistaxis.  NO  symptoms.      September 26, 2019  Ms Veronica presents for follow up.  She presents feeling generally close to her baseline, but has a number of complaints.  She reports increased fatigue and arthralgias.    Increased frequency of night sweats, and \"hot flashes\" occurring during daytime.  No overt fevers or chills.  No over rash but has 3 new small macular purpuric spots on extensor surface of R forearm  No L Ext swelling.  No cough or hemoptysis, no oral ulcers.      April 8, 2020  Has been in [place] since October 2019.  REceived maintenance dose of Rituximab prior to departure  Has not been able to return to [place] bec of COVID19 pandemic  Her  suffered a CVA with L sided weekness.  She is his full time care taker.  She denies any acute illness, and feels generally well.   She denies F/Chills/Cough/SOB  Denies dysuria or gross hematuria  No L Ext swelling  No rash.    She checks her BP at home.  Most recent one was 130/78    Had surveillance blood work in Dec 2019 and late March 2020 .  Results were reviewed with Ms Veronica over the phone.  ANCA were negative at both dates  Hgb 13.8 and 14.2  Plt 344 and 384  Cr 0.89 and 0.85  Chol 187  HDL 40  TSH 5.8    May 23, " "2020  Still in [place].  Hot flashes.  But no F/Chills/epistaxis/cough/SOB/CP/GI/ symptoms.    No unusual ADEOLA.  No rash  She is caring for her .  Gets 6-7K steps a day.  She is trying to lose weight, and states she lost 20 lbs.  Her goal is to get down to 199lbs.    August 20, 2020  At this time, she reports overall feeling okay. She did have COVID infection end of May/Rajni, and had some URI symptoms/chills and joint pains.  Since recovering, she is feeling back to her self overall. No fevers, chills, epistaxis, cough, chest pain, SOB, GI/ symptoms. No rashes. She continues to have chronic pain in multiple areas (ribs, knees, back), but nothing new or different. She states she has had hot flashes/sweats since 1997, and they have waxed and waned all those years, and continue to persist without significant change.  She is caring for her , who had a stroke in March. She still continues to get 5K-7K steps a day. She continues to try and work to lose weight, hit a bit of a stall, hanging around 205 lbs.     May 9, 2024  Last \"seen\" in Aug 2020.  Has been in [place]  Has been receiving rituximab q 6-9 months  Other than joint stiffness karel in am, she denies a history of epistaxis, cough, SOB, change in kidney function    Was found to have mild lung fibrosis on CT scan.    Last RTX infusion in Jan 2024   on 3/28/24:  CD19 were 0  UPCR 0.1    Treatment summary  March 2013: Plasmapheresis + Rituximab  Rituximab 1000 mg IV every 6 month since initial treatment in 2013 September 7, 2018: Last dose of Rituximab 1000 mg. (8months after previous dose)    October 2019:  Rituximab 500 mg maintenance dose  Ritux ~ q 6 months  Jan 2024 Ritux 500 mg maintenance Jan     PAST MEDICAL HISTORY:  Past Medical History:   Diagnosis Date    DVT (deep venous thrombosis) (H) 1990    occured 1 month after starting OCP    GERD (gastroesophageal reflux disease)     Hyperuricemia     Hypothyroid     Obesity (BMI 35.0-39.9 without " comorbidity)     Venous insufficiency (chronic) (peripheral)     Wegener's granulomatosis with renal involvement (H)     , No history of complicated pregnancies.  Last pregnancy with twins       PAST SURGICAL HISTORY:  Past Surgical History:   Procedure Laterality Date    HYSTERECTOMY      TUBAL LIGATION        MEDICATIONS:  Current Outpatient Medications   Medication Sig Dispense Refill    levothyroxine (SYNTHROID/LEVOTHROID) 137 MCG tablet daily   3    diltiazem 2% in PLO cream, FV COMPOUNDED, 2% GEL Apply small amount to anal opening three times daily for 8 weeks. (Patient not taking: Reported on 2019) 60 g 0    mupirocin (BACTROBAN) 2 % external ointment Use in nose twice daily for 5 days (Patient not taking: Reported on 2019) 22 g 0    sulfamethoxazole-trimethoprim (BACTRIM DS) 800-160 MG tablet Take 1 tablet Monday/Wednesday/Friday (Patient not taking: Reported on 2024) 12 tablet 2       ALLERGIES:    No Known Allergies     REVIEW OF SYSTEMS:  A comprehensive review of systems was performed and found to be negative except as described here or above.     FAMILY MEDICAL HISTORY:   Has 4 children, Daughter 48, Daughter 47, fraternal twin : daughter and son age 46    PHYSICAL EXAM:   /76   Pulse 67   Temp 97.7  F (36.5  C) (Oral)   Wt 100.4 kg (221 lb 4.8 oz)   SpO2 98%   BMI 39.20 kg/m    Sclerae anicteric  Carotids 2+ no bruits  COR: RRR no rub or gallop  Lungs clear  Abd obese, soft  LExt no edema  Neuro:  speech fluent.  Gait normal  Psy: Affect appropriate    LABS:     Recent Results (from the past 168 hour(s))   CBC with platelets and differential    Collection Time: 24 12:55 PM   Result Value Ref Range    WBC Count 8.5 4.0 - 11.0 10e3/uL    RBC Count 4.50 3.80 - 5.20 10e6/uL    Hemoglobin 13.3 11.7 - 15.7 g/dL    Hematocrit 41.0 35.0 - 47.0 %    MCV 91 78 - 100 fL    MCH 29.6 26.5 - 33.0 pg    MCHC 32.4 31.5 - 36.5 g/dL    RDW 13.7 10.0 - 15.0 %    Platelet Count 341  150 - 450 10e3/uL    % Neutrophils 70 %    % Lymphocytes 18 %    % Monocytes 7 %    % Eosinophils 4 %    % Basophils 1 %    % Immature Granulocytes 0 %    NRBCs per 100 WBC 0 <1 /100    Absolute Neutrophils 5.9 1.6 - 8.3 10e3/uL    Absolute Lymphocytes 1.6 0.8 - 5.3 10e3/uL    Absolute Monocytes 0.6 0.0 - 1.3 10e3/uL    Absolute Eosinophils 0.3 0.0 - 0.7 10e3/uL    Absolute Basophils 0.1 0.0 - 0.2 10e3/uL    Absolute Immature Granulocytes 0.0 <=0.4 10e3/uL    Absolute NRBCs 0.0 10e3/uL   UA with Microscopic    Collection Time: 05/09/24  1:00 PM   Result Value Ref Range    Color Urine Light Yellow Colorless, Straw, Light Yellow, Yellow    Appearance Urine Clear Clear    Glucose Urine Negative Negative mg/dL    Bilirubin Urine Negative Negative    Ketones Urine Negative Negative mg/dL    Specific Gravity Urine 1.030 1.003 - 1.035    Blood Urine Trace (A) Negative    pH Urine 5.5 5.0 - 7.0    Protein Albumin Urine Negative Negative mg/dL    Urobilinogen Urine Normal Normal, 2.0 mg/dL    Nitrite Urine Negative Negative    Leukocyte Esterase Urine Moderate (A) Negative    Mucus Urine Present (A) None Seen /LPF    RBC Urine 3 (H) <=2 /HPF    WBC Urine 5 <=5 /HPF    Squamous Epithelials Urine 3 (H) <=1 /HPF     Albumin   Date Value Ref Range Status   08/24/2020 3.4 3.4 - 5.0 g/dL Final   06/05/2020 3.9 3.4 - 4.9 g/dL Final   03/24/2020 4.0 3.4 - 4.9 g/dL Final   09/26/2019 3.4 3.4 - 5.0 g/dL Final   08/10/2019 3.2 (L) 3.4 - 5.0 g/dL Final   06/01/2019 3.2 (L) 3.4 - 5.0 g/dL Final   04/18/2019 3.3 (L) 3.4 - 5.0 g/dL Final         URINE STUDIES  Recent Labs   Lab Test 05/09/24  1300 08/24/20  1445 06/05/20  1114 09/26/19  1617 06/01/19  1105   COLOR Light Yellow Yellow Yellow Yellow Yellow   APPEARANCE Clear Clear Other Clear Clear   URINEGLC Negative Negative Neg Negative Negative   URINEBILI Negative Negative Other Negative Negative   URINEKETONE Negative Negative Neg Negative Negative   SG 1.030 >1.030 Other 1.020 1.019    UBLD Trace* Trace* Neg Small* Small*   URINEPH 5.5 5.5  --  5.0 5.0   PROTEIN Negative Negative  --  Negative Negative   UROBILINOGEN  --  2.0* 0.2  --   --    NITRITE Negative Negative Neg Negative Negative   LEUKEST Moderate* Small* Small Trace* Moderate*   RBCU 3* O - 2  --  1 3*   WBCU 5 0 - 5  --  1 10*     CT Chest without IV contrast 6/2/2015  Result Impression     1. 8 mm nodule, with adjacent smaller nodules, in the left lower lobe has been stable since outside chest CT 04/21/2014. Recommend follow-up imaging in 6-12 months.   2. Stable bilateral patchy ground glass opacities, likely due to shallow inspiration.   3. Mild right hilar adenopathy, which is likely reactive.     FINDINGS:  Stable 8 mm nodule in the superior segment of the left lower lobe; this nodule has been stable since outside chest CT 04/21/2014. The two surrounding smaller nodules are relatively stable in appearance since 12/01/2014 (these nodules were obscured on outside chest CT). No new pulmonary nodules. Stable scattered groundglass opacities in both lungs which may be due to shallow inspiration. Tiny calcified granuloma right lung apex. Trace amount of atelectasis in the lingula. Scattered mediastinal and left hilar lymph nodes are more prominent on today's exam, however remain within normal limits for size. New mild right hilar adenopathy, with largest node measuring approximately 1 cm in maximal diameter.     Electronically signed by:    MAGALIS Vargas -[id number] 02-Jun-2015 10:46        Mario Freeman MD [phone] 02-Jun-2015 10:46       CT lung 2018: Stable lung nodules and 1 mm non-obstructing stone at upper pole of left kidney      *CT CHEST HIGH RESOLUTION wo CONTRAST  EXAM: CT CHEST HIGH RESOLUTION wo CONTRAST  LOCATION: [facility]  DATE: 10/12/2023    INDICATION: Granulomatosis with polyangiitis, unspecified whether  renal involvement (HCC). Wegener's granulomatosis.    COMPARISON: The most recent CT chest is from  09/27/2018.  TECHNIQUE: High resolution images were obtained through the chest  during inspiration with select expiratory views. Prone imaging was  performed. Multiplanar reformats were obtained. Dose reduction  techniques were used.  CONTRAST: None.    FINDINGS:  LUNGS AND PLEURA: Allowing for some limitation on today's exam due to  respiratory motion artifact, pulmonary nodules have again remained  stable. These include the largest in the anterior aspect of the  superior segment left lower lobe measuring 7 mm in diameter on series  8, image 92. Some other examples include in the anterior left upper  lobe (image 102), anterior left lower lobe (images 97 and 98), and  along the major fissure in the right midlung (image 112). No new or  enlarging nodules identified. There is some mild peripheral  interstitial prominence and slight reticular change in the lower  lungs, suspected to represent some mild fibrosis, but this does not  show clear features for diffuse interstitial fibrosis. No evidence of  significant bronchiectasis. No significant air trapping. No pleural  effusion.    MEDIASTINUM/AXILLAE: No lymphadenopathy. No significant pericardial  fluid. Normal caliber thoracic aorta and esophagus.    CORONARY ARTERY CALCIFICATION: None.    UPPER ABDOMEN: No significant findings.    MUSCULOSKELETAL: Compression of the T6 vertebra has developed since  previous CT, status post vertebroplasty. Scoliotic curvature of the  spine, with some mild scattered degenerative changes.    IMPRESSION:  1.  Bilateral pulmonary nodules have remained stable, with no new or enlarging nodules identified.  2.  Although there are some findings suspicious for mild peripheral fibrosis in the lower lungs, this does not show clear characteristics of diffuse interstitial fibrosis. No evidence of honeycombing or bronchiectasis. No significant air trapping identified.  3.  See above for other exam details.  Exam End: 10/12/23  9:39 AM      ASSESSMENT AND PLAN:  # GPA (dx 2013)  Presented with pulmonary and renal involvement. S/P Rituximab q6 month since 5638-7927 without flare of the disease. As she has been through a quiescent course, we agreed to space out and/or reduce the dosage of rituximab. Last dose of rituximab was Oct 2019.     Ms Veronica is seen for the first time in a bit less than 4 years.  She has been on immunosuppression continuously for 13 years.  She has not had a documented relapse.   She presents with no signs or symptoms suggestive of active disease  CD19 B cells are depleted, and ANCA is equivocal  She has no proteinuria,  GFR is normal.    She is anxious about the finding of mild peripheral pulm fibrosis.  She has no symptoms associated with it, and it is difficult to ascertain the significance of this finding (I am not able to see the images).  I suspect that this is of questionable clinical significance.  It would be reasonable to repeat the CT sumner in the fall to see if there is any progression.       Overall, I would be comfortable spacing out her rituximab doses - and - if stable stopping them - although I sense that Ms Veronica is somewhat anxious about the latter prospect.    Follow up in 4 months     Mario Fernandes MD  Division of Nephrology and Hypertension  Pager: [phone]  May 9, 2024

## 2024-05-09 NOTE — NURSING NOTE
Chief Complaint   Patient presents with    RECHECK     Return Glomerular       /76   Pulse 67   Temp 97.7  F (36.5  C) (Oral)   Wt 100.4 kg (221 lb 4.8 oz)   SpO2 98%   BMI 39.20 kg/m      Giles Brandon on 5/9/2024 at 2:09 PM

## 2024-05-10 LAB
ANCA AB PATTERN SER IF-IMP: NORMAL
C-ANCA TITR SER IF: NORMAL {TITER}
CREAT UR-MCNC: 148 MG/DL
MICROALBUMIN UR-MCNC: <12 MG/L
MICROALBUMIN/CREAT UR: NORMAL MG/G{CREAT}
MYELOPEROXIDASE AB SER IA-ACNC: 2.1 U/ML
MYELOPEROXIDASE AB SER IA-ACNC: NEGATIVE
PROTEINASE3 AB SER IA-ACNC: 2.2 U/ML
PROTEINASE3 AB SER IA-ACNC: ABNORMAL

## 2025-01-12 ENCOUNTER — HEALTH MAINTENANCE LETTER (OUTPATIENT)
Age: 75
End: 2025-01-12

## 2025-07-18 ENCOUNTER — MYC MEDICAL ADVICE (OUTPATIENT)
Dept: NEPHROLOGY | Facility: CLINIC | Age: 75
End: 2025-07-18
Payer: MEDICARE

## 2025-07-18 DIAGNOSIS — M31.31 GRANULOMATOSIS WITH POLYANGIITIS WITH RENAL INVOLVEMENT (H): Primary | ICD-10-CM

## 2025-07-22 NOTE — TELEPHONE ENCOUNTER
Confirmed Dr. Fernandes is ok with virtual appt. Reached out to patient who has availability for Friday virtual, confirmed access to [facility]. Labs to be done at [facility] Wednesday.  Vasculitis Labs ordered and will fax as soon as they are signed to   [facility]: [phone] get fax.    Patient has concerns of CT changes and would like to discuss with Dr. Fernandes.  Instructed to call with any barriers to upcoming appt.  Vasculitis Program nurse line shared for timely follow up.  Wants to make sure Dr. Rod gets followup notes.    Elisa Andrade RN, BSN, PHN  Vasculitis & Lupus Program Nephrology Nurse Navigator  [phone]

## 2025-07-23 ENCOUNTER — DOCUMENTATION ONLY (OUTPATIENT)
Dept: NEPHROLOGY | Facility: CLINIC | Age: 75
End: 2025-07-23
Payer: MEDICARE

## 2025-07-31 ENCOUNTER — TELEPHONE (OUTPATIENT)
Dept: NEPHROLOGY | Facility: CLINIC | Age: 75
End: 2025-07-31
Payer: MEDICARE

## 2025-07-31 NOTE — TELEPHONE ENCOUNTER
Patient Contacted for the patient to call back and schedule the following:    Appointment type: RTN GLOM/ Vasculitis   Provider: Dr. Fernandes  Return date: Decemeber 2025  Specialty phone number: [phone]  Additional appointment(s) needed: labs  Additonal Notes: Writer called patient and informed her that Dr. Fernandes wanted to schedule a 4 month follow up in December. Pt stated that she did not want to schedule at this time and she would call back when she was ready.